# Patient Record
Sex: MALE | Race: WHITE | NOT HISPANIC OR LATINO | Employment: OTHER | URBAN - METROPOLITAN AREA
[De-identification: names, ages, dates, MRNs, and addresses within clinical notes are randomized per-mention and may not be internally consistent; named-entity substitution may affect disease eponyms.]

---

## 2017-02-21 ENCOUNTER — CONVERSION ENCOUNTER (OUTPATIENT)
Dept: CARDIOLOGY | Facility: CLINIC | Age: 67
End: 2017-02-21

## 2017-02-22 LAB
ALBUMIN SERPL-MCNC: 4.6 G/DL (ref 3.6–4.8)
ALP SERPL-CCNC: 69 IU/L (ref 39–117)
ALT SERPL-CCNC: 19 IU/L (ref 0–44)
AMBIG ABBREV HFP7 DEFAULT: NORMAL
AMBIG ABBREV LP DEFAULT: NORMAL
AST SERPL-CCNC: 24 IU/L (ref 0–40)
BILIRUB DIRECT SERPL-MCNC: 0.15 MG/DL (ref 0–0.4)
BILIRUB SERPL-MCNC: 0.5 MG/DL (ref 0–1.2)
CHOLEST SERPL-MCNC: 140 MG/DL (ref 100–199)
HDLC SERPL-MCNC: 48 MG/DL
LDLC SERPL CALC-MCNC: 71 MG/DL (ref 0–99)
PROT SERPL-MCNC: 6.6 G/DL (ref 6–8.5)
TRIGL SERPL-MCNC: 105 MG/DL (ref 0–149)
VLDLC SERPL CALC-MCNC: 21 MG/DL (ref 5–40)

## 2017-02-27 ENCOUNTER — OFFICE VISIT (OUTPATIENT)
Dept: CARDIOLOGY | Facility: CLINIC | Age: 67
End: 2017-02-27

## 2017-02-27 VITALS
WEIGHT: 166.8 LBS | SYSTOLIC BLOOD PRESSURE: 130 MMHG | DIASTOLIC BLOOD PRESSURE: 76 MMHG | HEIGHT: 70 IN | BODY MASS INDEX: 23.88 KG/M2 | HEART RATE: 82 BPM

## 2017-02-27 DIAGNOSIS — I10 ESSENTIAL HYPERTENSION: ICD-10-CM

## 2017-02-27 DIAGNOSIS — E78.5 DYSLIPIDEMIA: ICD-10-CM

## 2017-02-27 DIAGNOSIS — I25.10 CORONARY ARTERY DISEASE INVOLVING NATIVE CORONARY ARTERY OF NATIVE HEART WITHOUT ANGINA PECTORIS: Primary | ICD-10-CM

## 2017-02-27 PROCEDURE — 99213 OFFICE O/P EST LOW 20 MIN: CPT | Performed by: NURSE PRACTITIONER

## 2017-02-27 RX ORDER — LISINOPRIL 20 MG/1
20 TABLET ORAL DAILY
COMMUNITY
End: 2019-03-11

## 2017-02-27 RX ORDER — MELATONIN
5000 DAILY
COMMUNITY

## 2017-02-27 RX ORDER — LEVOTHYROXINE SODIUM 0.07 MG/1
75 TABLET ORAL DAILY
COMMUNITY

## 2017-02-27 RX ORDER — ATORVASTATIN CALCIUM 20 MG/1
20 TABLET, FILM COATED ORAL DAILY
COMMUNITY
End: 2022-05-09 | Stop reason: SDDI

## 2017-02-27 RX ORDER — EZETIMIBE 10 MG/1
10 TABLET ORAL DAILY
COMMUNITY
End: 2022-05-09 | Stop reason: SDDI

## 2017-02-27 RX ORDER — ASPIRIN 325 MG
325 TABLET, DELAYED RELEASE (ENTERIC COATED) ORAL DAILY
COMMUNITY

## 2017-02-27 NOTE — PROGRESS NOTES
Subjective:     Encounter Date:02/27/2017      Patient ID: Gee Whitney is a 66 y.o. male.    Chief Complaint: Coronary Artery Disease and Hypertension    PROBLEM LIST:  1. Coronary artery disease:  a. 1977, myocardial infarction with cardiac catheterization by Dr. Carlos Gilmore which showed an occluded LAD with reported collaterals per patient report, normal LVEF.  b. 2005, treadmill Cardiolite which showed prior anterior and septal scar with limited den-infarction ischemia, EF of 41%.  Medical management at that time.  c. Myocardial perfusion study, 2014:  Ejection fraction of 50%, anterior apical scar.  No ischemia.    2. Hypertension.   3. Dyslipidemia.  4. History of elevated LFTs.   5. Renal cyst.  6. Hypothyroidism.  7. Bilateral cataract surgery.    8. Remote tobacco abuse.       History of Present Illness  Patient returns today for follow up with a history of CAD, hypertension, and dyslipidemia. Since last visit patient has been feeling ell.  He denies any chest pain, pressure, tightness.  Denies any increasing shortness of breath.  Denies any syncope, near-syncope, edema.  Denies any orthopnea.  Notes that he is able to perform his usual activities without any cardiovascular limiting symptoms.  He does not smoke.    No Known Allergies      Current Outpatient Prescriptions:   •  aspirin  MG tablet, Take 325 mg by mouth Daily., Disp: , Rfl:   •  atorvastatin (LIPITOR) 20 MG tablet, Take 20 mg by mouth Daily., Disp: , Rfl:   •  cholecalciferol (VITAMIN D3) 1000 UNITS tablet, Take 1,000 Units by mouth Daily., Disp: , Rfl:   •  Docusate Calcium (STOOL SOFTENER PO), Take  by mouth Daily., Disp: , Rfl:   •  ezetimibe (ZETIA) 10 MG tablet, Take 10 mg by mouth Daily., Disp: , Rfl:   •  levothyroxine (SYNTHROID, LEVOTHROID) 75 MCG tablet, Take 75 mcg by mouth Daily., Disp: , Rfl:   •  lisinopril (PRINIVIL,ZESTRIL) 20 MG tablet, Take 20 mg by mouth Daily., Disp: , Rfl:   •  Multiple Vitamins-Minerals  "(MULTIVITAMIN ADULT PO), Take  by mouth Daily., Disp: , Rfl:   •  Omega-3 Fatty Acids (FISH OIL) 1200 MG capsule delayed-release, Take 1,000 mg by mouth Daily., Disp: , Rfl:     The following portions of the patient's history were reviewed and updated as appropriate: allergies, current medications, past family history, past medical history, past social history, past surgical history and problem list.    Review of Systems   Constitution: Negative.   Cardiovascular: Negative.    Respiratory: Negative.    Hematologic/Lymphatic: Negative for bleeding problem. Does not bruise/bleed easily.   Skin: Negative for rash.   Musculoskeletal: Negative for muscle weakness and myalgias.   Gastrointestinal: Negative for heartburn, nausea and vomiting.   Neurological: Negative.           Objective:     Visit Vitals   • /76 (BP Location: Right arm, Patient Position: Sitting)   • Pulse 82   • Ht 70\" (177.8 cm)   • Wt 166 lb 12.8 oz (75.7 kg)   • BMI 23.93 kg/m2           Physical Exam   Constitutional: He is oriented to person, place, and time. He appears well-developed and well-nourished. No distress.   HENT:   Mouth/Throat: Oropharynx is clear and moist.   Neck: No JVD present. Carotid bruit is not present.   Cardiovascular: Normal rate, regular rhythm, S1 normal, S2 normal and normal heart sounds.    Pulmonary/Chest: Effort normal and breath sounds normal. No respiratory distress.   Abdominal: Soft. Bowel sounds are normal. There is no tenderness.   Musculoskeletal: He exhibits no edema.   Neurological: He is alert and oriented to person, place, and time.   Skin: Skin is warm and dry.       Lab Review:    Procedures  Labs from February 2017 reviewed with normal AST and ALT.  LDL of 71, HDL of 48.      Assessment:   Gee was seen today for coronary artery disease and hypertension.    Diagnoses and all orders for this visit:    Coronary artery disease involving native coronary artery of native heart without angina " pectoris    Essential hypertension, controlled.    Dyslipidemia, controlled and on statin therapy.      Plan:    1. Continue current medications.  2. Revisit in 12 MO, or sooner as needed.    GAURI Garcia

## 2018-02-20 DIAGNOSIS — Z79.899 HIGH RISK MEDICATION USE: ICD-10-CM

## 2018-02-20 DIAGNOSIS — E78.5 DYSLIPIDEMIA: Primary | ICD-10-CM

## 2018-02-22 ENCOUNTER — TELEPHONE (OUTPATIENT)
Dept: CARDIOLOGY | Facility: CLINIC | Age: 68
End: 2018-02-22

## 2018-02-22 NOTE — TELEPHONE ENCOUNTER
Patient called and requested lab requisition faxed to [facility] [place] 2] so he can have lipid panel and hepatic function panel drawn prior to his appt. Req's faxed to [phone]. Confirmation received, patient notified.

## 2018-02-24 LAB
ALBUMIN SERPL-MCNC: 4.8 G/DL (ref 3.6–4.8)
ALP SERPL-CCNC: 68 IU/L (ref 39–117)
ALT SERPL-CCNC: 20 IU/L (ref 0–44)
AMBIG ABBREV LP DEFAULT: NORMAL
AST SERPL-CCNC: 26 IU/L (ref 0–40)
BILIRUB DIRECT SERPL-MCNC: 0.15 MG/DL (ref 0–0.4)
BILIRUB SERPL-MCNC: 0.5 MG/DL (ref 0–1.2)
CHOLEST SERPL-MCNC: 146 MG/DL (ref 100–199)
HDLC SERPL-MCNC: 51 MG/DL
LDLC SERPL CALC-MCNC: 79 MG/DL (ref 0–99)
PROT SERPL-MCNC: 6.9 G/DL (ref 6–8.5)
TRIGL SERPL-MCNC: 79 MG/DL (ref 0–149)
VLDLC SERPL CALC-MCNC: 16 MG/DL (ref 5–40)

## 2018-03-05 ENCOUNTER — OFFICE VISIT (OUTPATIENT)
Dept: CARDIOLOGY | Facility: CLINIC | Age: 68
End: 2018-03-05

## 2018-03-05 VITALS
BODY MASS INDEX: 25.33 KG/M2 | HEART RATE: 92 BPM | WEIGHT: 171 LBS | SYSTOLIC BLOOD PRESSURE: 140 MMHG | DIASTOLIC BLOOD PRESSURE: 86 MMHG | HEIGHT: 69 IN | OXYGEN SATURATION: 95 %

## 2018-03-05 DIAGNOSIS — I25.10 CORONARY ARTERY DISEASE INVOLVING NATIVE CORONARY ARTERY OF NATIVE HEART WITHOUT ANGINA PECTORIS: Primary | ICD-10-CM

## 2018-03-05 DIAGNOSIS — E78.5 DYSLIPIDEMIA: ICD-10-CM

## 2018-03-05 DIAGNOSIS — I10 ESSENTIAL HYPERTENSION: ICD-10-CM

## 2018-03-05 PROCEDURE — 99214 OFFICE O/P EST MOD 30 MIN: CPT | Performed by: INTERNAL MEDICINE

## 2018-03-05 NOTE — PROGRESS NOTES
Subjective:     Encounter Date:03/05/2018      Patient ID: Gee Whitney is a 67 y.o. male.    Chief Complaint: Coronary Artery Disease and Hypertension      PROBLEM LIST:  1. Coronary artery disease:  a. 1977, myocardial infarction with cardiac catheterization by Dr. Carlos Gilmore which showed an occluded LAD with reported collaterals per patient report, normal LVEF.  b. 2005, treadmill Cardiolite which showed prior anterior and septal scar with limited den-infarction ischemia, EF of 41%.  Medical management at that time.  c. Myocardial perfusion study, 2014:  Ejection fraction of 50%, anterior apical scar.  No ischemia.    d. Ultrasound aorta 4/27/16: No abdominal aortic aneurysm  2. Hypertension.   3. Dyslipidemia.  4. History of elevated LFTs.   5. Renal cyst.  6. Hypothyroidism.  7. Bilateral cataract surgery.    8. Remote tobacco abuse.    History of Present Illness  Patient returns today for follow up with a history of CAD and cardiac risk factors. Since last visit has been doing well form a a cardiac standpoint, but is going through a bout of a viral infection. Denies any exertional chest pain, shortness of breath, orthopnea, PND, or palpitations. Before his viral illnes remained busy and active with no limitations, but has not been able to stay as active since his illness.    No Known Allergies      Current Outpatient Prescriptions:   •  aspirin  MG tablet, Take 325 mg by mouth Daily., Disp: , Rfl:   •  atorvastatin (LIPITOR) 20 MG tablet, Take 20 mg by mouth Daily., Disp: , Rfl:   •  cholecalciferol (VITAMIN D3) 1000 UNITS tablet, Take 1,000 Units by mouth Daily., Disp: , Rfl:   •  Docusate Calcium (STOOL SOFTENER PO), Take  by mouth Daily., Disp: , Rfl:   •  ezetimibe (ZETIA) 10 MG tablet, Take 10 mg by mouth Daily., Disp: , Rfl:   •  levothyroxine (SYNTHROID, LEVOTHROID) 75 MCG tablet, Take 75 mcg by mouth Daily., Disp: , Rfl:   •  lisinopril (PRINIVIL,ZESTRIL) 20 MG tablet, Take 20 mg  "by mouth Daily., Disp: , Rfl:   •  Multiple Vitamins-Minerals (MULTIVITAMIN ADULT PO), Take  by mouth Daily., Disp: , Rfl:   •  Omega-3 Fatty Acids (FISH OIL) 1200 MG capsule delayed-release, Take 1,200 mg by mouth Daily., Disp: , Rfl:     The following portions of the patient's history were reviewed and updated as appropriate: allergies, current medications, past family history, past medical history, past social history, past surgical history and problem list.    Review of Systems   Constitution: Negative.   Cardiovascular: Negative.    Respiratory: Negative.    Hematologic/Lymphatic: Negative for bleeding problem. Does not bruise/bleed easily.   Skin: Negative for rash.   Musculoskeletal: Negative for muscle weakness and myalgias.   Gastrointestinal: Negative for heartburn, nausea and vomiting.   Neurological: Negative.           Objective:     Vitals:    03/05/18 0908   BP: 140/86   BP Location: Right arm   Patient Position: Sitting   Pulse: 92   SpO2: 95%   Weight: 77.6 kg (171 lb)   Height: 175.3 cm (69\")         Physical Exam   Constitutional: He is oriented to person, place, and time. He appears well-developed and well-nourished. No distress.   HENT:   Mouth/Throat: Oropharynx is clear and moist.   Neck: No JVD present. Carotid bruit is not present.   Cardiovascular: Normal rate, regular rhythm, S1 normal, S2 normal and normal heart sounds.    Pulmonary/Chest: Effort normal. No respiratory distress. He has wheezes.   Abdominal: Soft. Bowel sounds are normal. There is no tenderness.   Musculoskeletal: He exhibits no edema.   Neurological: He is alert and oriented to person, place, and time.   Skin: Skin is warm and dry.       Lab Review:    Procedures        Assessment:   Gee was seen today for coronary artery disease and hypertension.    Diagnoses and all orders for this visit:    Coronary artery disease involving native coronary artery of native heart without angina pectoris    Essential " hypertension    Dyslipidemia        Impression  1. CAD: Stable.  2. Hypertension: Well controlled with current medication regimen.  3. Dyslipidemia: Well controlled with atorvastatin 20 mg. LDL reviewed, 79 with HDL of 51.  4. Recent viral bronchitis    Plan:  1. Schedule Cardiolite stress test.  2. Continue current medications.  3. Revisit in 12 MO, or sooner as needed.    Scribed for Shaun Joseph MD by Isac Carvajal. 3/5/2018  9:45 AM    I, Shaun Joseph MD, personally performed the services described in this documentation as scribed by the above individual in my presence, and it is both accurate and complete      Please note that portions of this note may have been completed with a voice recognition program. Efforts were made to edit the dictations, but occasionally words are mistranscribed.

## 2018-03-06 ENCOUNTER — HOSPITAL ENCOUNTER (OUTPATIENT)
Dept: GENERAL RADIOLOGY | Facility: HOSPITAL | Age: 68
Discharge: HOME | End: 2018-03-06
Attending: FAMILY MEDICINE | Admitting: FAMILY MEDICINE

## 2018-03-06 ENCOUNTER — TRANSCRIBE ORDERS (OUTPATIENT)
Dept: ADMINISTRATIVE | Facility: HOSPITAL | Age: 68
End: 2018-03-06

## 2018-03-06 DIAGNOSIS — J40 BRONCHITIS: ICD-10-CM

## 2018-03-06 DIAGNOSIS — J40 BRONCHITIS: Primary | ICD-10-CM

## 2018-03-06 PROCEDURE — 71046 X-RAY EXAM CHEST 2 VIEWS: CPT

## 2018-03-07 ENCOUNTER — APPOINTMENT (OUTPATIENT)
Dept: CT IMAGING | Facility: HOSPITAL | Age: 68
End: 2018-03-07

## 2018-03-07 ENCOUNTER — HOSPITAL ENCOUNTER (INPATIENT)
Facility: HOSPITAL | Age: 68
LOS: 7 days | Discharge: HOME | End: 2018-03-14
Attending: EMERGENCY MEDICINE | Admitting: HOSPITALIST

## 2018-03-07 ENCOUNTER — APPOINTMENT (OUTPATIENT)
Dept: GENERAL RADIOLOGY | Facility: HOSPITAL | Age: 68
End: 2018-03-07

## 2018-03-07 DIAGNOSIS — R55 SYNCOPE AND COLLAPSE: ICD-10-CM

## 2018-03-07 DIAGNOSIS — J18.9 COMMUNITY ACQUIRED PNEUMONIA OF RIGHT LOWER LOBE OF LUNG: Primary | ICD-10-CM

## 2018-03-07 DIAGNOSIS — S01.01XA LACERATION OF SCALP, INITIAL ENCOUNTER: ICD-10-CM

## 2018-03-07 PROBLEM — E03.9 HYPOTHYROIDISM: Status: ACTIVE | Noted: 2018-03-07

## 2018-03-07 LAB
ALBUMIN SERPL-MCNC: 3.8 G/DL (ref 3.2–4.8)
ALBUMIN/GLOB SERPL: 1.7 G/DL (ref 1.5–2.5)
ALP SERPL-CCNC: 53 U/L (ref 25–100)
ALT SERPL W P-5'-P-CCNC: 24 U/L (ref 7–40)
ANION GAP SERPL CALCULATED.3IONS-SCNC: 4 MMOL/L (ref 3–11)
AST SERPL-CCNC: 27 U/L (ref 0–33)
BASOPHILS # BLD AUTO: 0.02 10*3/MM3 (ref 0–0.2)
BASOPHILS NFR BLD AUTO: 0.2 % (ref 0–1)
BILIRUB SERPL-MCNC: 0.5 MG/DL (ref 0.3–1.2)
BILIRUB UR QL STRIP: NEGATIVE
BUN BLD-MCNC: 32 MG/DL (ref 9–23)
BUN/CREAT SERPL: 26.7 (ref 7–25)
CALCIUM SPEC-SCNC: 8.5 MG/DL (ref 8.7–10.4)
CHLORIDE SERPL-SCNC: 112 MMOL/L (ref 99–109)
CLARITY UR: CLEAR
CO2 SERPL-SCNC: 25 MMOL/L (ref 20–31)
COLOR UR: YELLOW
CREAT BLD-MCNC: 1.2 MG/DL (ref 0.6–1.3)
D DIMER PPP FEU-MCNC: 0.7 MG/L (FEU) (ref 0–0.5)
D-LACTATE SERPL-SCNC: 1.9 MMOL/L (ref 0.5–2)
DEPRECATED RDW RBC AUTO: 43.1 FL (ref 37–54)
EOSINOPHIL # BLD AUTO: 0.18 10*3/MM3 (ref 0–0.3)
EOSINOPHIL NFR BLD AUTO: 1.5 % (ref 0–3)
ERYTHROCYTE [DISTWIDTH] IN BLOOD BY AUTOMATED COUNT: 12.9 % (ref 11.3–14.5)
GFR SERPL CREATININE-BSD FRML MDRD: 60 ML/MIN/1.73
GLOBULIN UR ELPH-MCNC: 2.3 GM/DL
GLUCOSE BLD-MCNC: 120 MG/DL (ref 70–100)
GLUCOSE UR STRIP-MCNC: NEGATIVE MG/DL
HCT VFR BLD AUTO: 42.3 % (ref 38.9–50.9)
HGB BLD-MCNC: 14 G/DL (ref 13.1–17.5)
HGB UR QL STRIP.AUTO: NEGATIVE
HOLD SPECIMEN: NORMAL
HOLD SPECIMEN: NORMAL
IMM GRANULOCYTES # BLD: 0.06 10*3/MM3 (ref 0–0.03)
IMM GRANULOCYTES NFR BLD: 0.5 % (ref 0–0.6)
KETONES UR QL STRIP: NEGATIVE
LEUKOCYTE ESTERASE UR QL STRIP.AUTO: NEGATIVE
LYMPHOCYTES # BLD AUTO: 1.77 10*3/MM3 (ref 0.6–4.8)
LYMPHOCYTES NFR BLD AUTO: 14.3 % (ref 24–44)
MAGNESIUM SERPL-MCNC: 2.2 MG/DL (ref 1.3–2.7)
MCH RBC QN AUTO: 30.6 PG (ref 27–31)
MCHC RBC AUTO-ENTMCNC: 33.1 G/DL (ref 32–36)
MCV RBC AUTO: 92.4 FL (ref 80–99)
MONOCYTES # BLD AUTO: 1.08 10*3/MM3 (ref 0–1)
MONOCYTES NFR BLD AUTO: 8.7 % (ref 0–12)
NEUTROPHILS # BLD AUTO: 9.29 10*3/MM3 (ref 1.5–8.3)
NEUTROPHILS NFR BLD AUTO: 74.8 % (ref 41–71)
NITRITE UR QL STRIP: NEGATIVE
PH UR STRIP.AUTO: 5.5 [PH] (ref 5–8)
PLATELET # BLD AUTO: 194 10*3/MM3 (ref 150–450)
PMV BLD AUTO: 9.4 FL (ref 6–12)
POTASSIUM BLD-SCNC: 4.2 MMOL/L (ref 3.5–5.5)
PROT SERPL-MCNC: 6.1 G/DL (ref 5.7–8.2)
PROT UR QL STRIP: NEGATIVE
RBC # BLD AUTO: 4.58 10*6/MM3 (ref 4.2–5.76)
SODIUM BLD-SCNC: 141 MMOL/L (ref 132–146)
SP GR UR STRIP: 1.02 (ref 1–1.03)
TROPONIN I SERPL-MCNC: 0 NG/ML (ref 0–0.07)
TROPONIN I SERPL-MCNC: 0.01 NG/ML (ref 0–0.07)
UROBILINOGEN UR QL STRIP: NORMAL
WBC NRBC COR # BLD: 12.4 10*3/MM3 (ref 3.5–10.8)
WHOLE BLOOD HOLD SPECIMEN: NORMAL
WHOLE BLOOD HOLD SPECIMEN: NORMAL

## 2018-03-07 PROCEDURE — G0378 HOSPITAL OBSERVATION PER HR: HCPCS

## 2018-03-07 PROCEDURE — 25010000002 METHYLPREDNISOLONE PER 125 MG: Performed by: NURSE PRACTITIONER

## 2018-03-07 PROCEDURE — 99223 1ST HOSP IP/OBS HIGH 75: CPT | Performed by: INTERNAL MEDICINE

## 2018-03-07 PROCEDURE — 80053 COMPREHEN METABOLIC PANEL: CPT | Performed by: EMERGENCY MEDICINE

## 2018-03-07 PROCEDURE — 94640 AIRWAY INHALATION TREATMENT: CPT

## 2018-03-07 PROCEDURE — 87040 BLOOD CULTURE FOR BACTERIA: CPT | Performed by: NURSE PRACTITIONER

## 2018-03-07 PROCEDURE — 83735 ASSAY OF MAGNESIUM: CPT | Performed by: EMERGENCY MEDICINE

## 2018-03-07 PROCEDURE — 25010000002 HEPARIN (PORCINE) PER 1000 UNITS: Performed by: NURSE PRACTITIONER

## 2018-03-07 PROCEDURE — 71045 X-RAY EXAM CHEST 1 VIEW: CPT

## 2018-03-07 PROCEDURE — 85025 COMPLETE CBC W/AUTO DIFF WBC: CPT | Performed by: EMERGENCY MEDICINE

## 2018-03-07 PROCEDURE — 84484 ASSAY OF TROPONIN QUANT: CPT

## 2018-03-07 PROCEDURE — 0HQ0XZZ REPAIR SCALP SKIN, EXTERNAL APPROACH: ICD-10-PCS | Performed by: EMERGENCY MEDICINE

## 2018-03-07 PROCEDURE — 70450 CT HEAD/BRAIN W/O DYE: CPT

## 2018-03-07 PROCEDURE — 85379 FIBRIN DEGRADATION QUANT: CPT | Performed by: NURSE PRACTITIONER

## 2018-03-07 PROCEDURE — 94760 N-INVAS EAR/PLS OXIMETRY 1: CPT

## 2018-03-07 PROCEDURE — 93005 ELECTROCARDIOGRAM TRACING: CPT | Performed by: EMERGENCY MEDICINE

## 2018-03-07 PROCEDURE — 81003 URINALYSIS AUTO W/O SCOPE: CPT | Performed by: EMERGENCY MEDICINE

## 2018-03-07 PROCEDURE — 83605 ASSAY OF LACTIC ACID: CPT | Performed by: NURSE PRACTITIONER

## 2018-03-07 PROCEDURE — 99285 EMERGENCY DEPT VISIT HI MDM: CPT

## 2018-03-07 PROCEDURE — 71275 CT ANGIOGRAPHY CHEST: CPT

## 2018-03-07 PROCEDURE — 25010000002 AZITHROMYCIN: Performed by: NURSE PRACTITIONER

## 2018-03-07 PROCEDURE — 25010000002 CEFTRIAXONE PER 250 MG: Performed by: NURSE PRACTITIONER

## 2018-03-07 PROCEDURE — 0 IOPAMIDOL PER 1 ML: Performed by: EMERGENCY MEDICINE

## 2018-03-07 PROCEDURE — 94799 UNLISTED PULMONARY SVC/PX: CPT

## 2018-03-07 RX ORDER — ATORVASTATIN CALCIUM 20 MG/1
20 TABLET, FILM COATED ORAL NIGHTLY
Status: DISCONTINUED | OUTPATIENT
Start: 2018-03-07 | End: 2018-03-14 | Stop reason: HOSPADM

## 2018-03-07 RX ORDER — DOCUSATE SODIUM 100 MG/1
100 CAPSULE, LIQUID FILLED ORAL DAILY
Status: DISCONTINUED | OUTPATIENT
Start: 2018-03-07 | End: 2018-03-09

## 2018-03-07 RX ORDER — CEFTRIAXONE SODIUM 1 G/50ML
1 INJECTION, SOLUTION INTRAVENOUS ONCE
Status: COMPLETED | OUTPATIENT
Start: 2018-03-07 | End: 2018-03-07

## 2018-03-07 RX ORDER — CEFTRIAXONE SODIUM 1 G/50ML
1 INJECTION, SOLUTION INTRAVENOUS EVERY 24 HOURS
Status: COMPLETED | OUTPATIENT
Start: 2018-03-08 | End: 2018-03-11

## 2018-03-07 RX ORDER — IPRATROPIUM BROMIDE AND ALBUTEROL SULFATE 2.5; .5 MG/3ML; MG/3ML
3 SOLUTION RESPIRATORY (INHALATION)
Status: DISCONTINUED | OUTPATIENT
Start: 2018-03-07 | End: 2018-03-09

## 2018-03-07 RX ORDER — IPRATROPIUM BROMIDE AND ALBUTEROL SULFATE 2.5; .5 MG/3ML; MG/3ML
3 SOLUTION RESPIRATORY (INHALATION) ONCE
Status: COMPLETED | OUTPATIENT
Start: 2018-03-07 | End: 2018-03-07

## 2018-03-07 RX ORDER — SODIUM CHLORIDE 9 MG/ML
75 INJECTION, SOLUTION INTRAVENOUS CONTINUOUS
Status: DISCONTINUED | OUTPATIENT
Start: 2018-03-07 | End: 2018-03-11

## 2018-03-07 RX ORDER — MELATONIN
1000 DAILY
Status: DISCONTINUED | OUTPATIENT
Start: 2018-03-07 | End: 2018-03-14 | Stop reason: HOSPADM

## 2018-03-07 RX ORDER — SODIUM CHLORIDE 9 MG/ML
250 INJECTION, SOLUTION INTRAVENOUS CONTINUOUS
Status: DISCONTINUED | OUTPATIENT
Start: 2018-03-07 | End: 2018-03-11

## 2018-03-07 RX ORDER — MULTIPLE VITAMINS W/ MINERALS TAB 9MG-400MCG
1 TAB ORAL DAILY
Status: DISCONTINUED | OUTPATIENT
Start: 2018-03-07 | End: 2018-03-14 | Stop reason: HOSPADM

## 2018-03-07 RX ORDER — LEVOTHYROXINE SODIUM 0.07 MG/1
75 TABLET ORAL
Status: DISCONTINUED | OUTPATIENT
Start: 2018-03-07 | End: 2018-03-14 | Stop reason: HOSPADM

## 2018-03-07 RX ORDER — SODIUM CHLORIDE 0.9 % (FLUSH) 0.9 %
10 SYRINGE (ML) INJECTION AS NEEDED
Status: DISCONTINUED | OUTPATIENT
Start: 2018-03-07 | End: 2018-03-14 | Stop reason: HOSPADM

## 2018-03-07 RX ORDER — ASPIRIN 325 MG
325 TABLET, DELAYED RELEASE (ENTERIC COATED) ORAL DAILY
Status: DISCONTINUED | OUTPATIENT
Start: 2018-03-07 | End: 2018-03-14 | Stop reason: HOSPADM

## 2018-03-07 RX ORDER — HEPARIN SODIUM 5000 [USP'U]/ML
5000 INJECTION, SOLUTION INTRAVENOUS; SUBCUTANEOUS EVERY 8 HOURS SCHEDULED
Status: DISCONTINUED | OUTPATIENT
Start: 2018-03-07 | End: 2018-03-14 | Stop reason: HOSPADM

## 2018-03-07 RX ORDER — METHYLPREDNISOLONE SODIUM SUCCINATE 125 MG/2ML
125 INJECTION, POWDER, LYOPHILIZED, FOR SOLUTION INTRAMUSCULAR; INTRAVENOUS ONCE
Status: COMPLETED | OUTPATIENT
Start: 2018-03-07 | End: 2018-03-07

## 2018-03-07 RX ORDER — LIDOCAINE HYDROCHLORIDE 10 MG/ML
10 INJECTION, SOLUTION EPIDURAL; INFILTRATION; INTRACAUDAL; PERINEURAL ONCE
Status: COMPLETED | OUTPATIENT
Start: 2018-03-07 | End: 2018-03-07

## 2018-03-07 RX ORDER — ATORVASTATIN CALCIUM 20 MG/1
20 TABLET, FILM COATED ORAL DAILY
Status: DISCONTINUED | OUTPATIENT
Start: 2018-03-07 | End: 2018-03-07

## 2018-03-07 RX ADMIN — METHYLPREDNISOLONE SODIUM SUCCINATE 125 MG: 125 INJECTION, POWDER, FOR SOLUTION INTRAMUSCULAR; INTRAVENOUS at 03:40

## 2018-03-07 RX ADMIN — LIDOCAINE HYDROCHLORIDE 10 ML: 10 INJECTION, SOLUTION EPIDURAL; INFILTRATION; INTRACAUDAL; PERINEURAL at 04:36

## 2018-03-07 RX ADMIN — HEPARIN SODIUM 5000 UNITS: 5000 INJECTION, SOLUTION INTRAVENOUS; SUBCUTANEOUS at 15:01

## 2018-03-07 RX ADMIN — IPRATROPIUM BROMIDE AND ALBUTEROL SULFATE 3 ML: 2.5; .5 SOLUTION RESPIRATORY (INHALATION) at 15:10

## 2018-03-07 RX ADMIN — IPRATROPIUM BROMIDE AND ALBUTEROL SULFATE 3 ML: 2.5; .5 SOLUTION RESPIRATORY (INHALATION) at 23:35

## 2018-03-07 RX ADMIN — VITAMIN D, TAB 1000IU (100/BT) 1000 UNITS: 25 TAB at 09:37

## 2018-03-07 RX ADMIN — ASPIRIN 325 MG: 325 TABLET, DELAYED RELEASE ORAL at 09:22

## 2018-03-07 RX ADMIN — SODIUM CHLORIDE 250 ML/HR: 9 INJECTION, SOLUTION INTRAVENOUS at 04:47

## 2018-03-07 RX ADMIN — IOPAMIDOL 50 ML: 755 INJECTION, SOLUTION INTRAVENOUS at 05:24

## 2018-03-07 RX ADMIN — AZITHROMYCIN MONOHYDRATE 500 MG: 500 INJECTION, POWDER, LYOPHILIZED, FOR SOLUTION INTRAVENOUS at 06:48

## 2018-03-07 RX ADMIN — ATORVASTATIN CALCIUM 20 MG: 20 TABLET, FILM COATED ORAL at 20:32

## 2018-03-07 RX ADMIN — DOCUSATE SODIUM 100 MG: 100 CAPSULE, LIQUID FILLED ORAL at 09:23

## 2018-03-07 RX ADMIN — MULTIPLE VITAMINS W/ MINERALS TAB 1 TABLET: TAB ORAL at 09:22

## 2018-03-07 RX ADMIN — CEFTRIAXONE SODIUM 1 G: 1 INJECTION, SOLUTION INTRAVENOUS at 05:49

## 2018-03-07 RX ADMIN — DOXYCYCLINE 100 MG: 100 INJECTION, POWDER, LYOPHILIZED, FOR SOLUTION INTRAVENOUS at 12:41

## 2018-03-07 RX ADMIN — HYDROCODONE POLISTIREX AND CHLORPHENIRAMINE POLISTIREX 5 ML: 10; 8 SUSPENSION, EXTENDED RELEASE ORAL at 03:40

## 2018-03-07 RX ADMIN — IPRATROPIUM BROMIDE AND ALBUTEROL SULFATE 3 ML: .5; 2.5 SOLUTION RESPIRATORY (INHALATION) at 03:40

## 2018-03-07 RX ADMIN — IPRATROPIUM BROMIDE AND ALBUTEROL SULFATE 3 ML: 2.5; .5 SOLUTION RESPIRATORY (INHALATION) at 11:11

## 2018-03-07 RX ADMIN — DOXYCYCLINE 100 MG: 100 INJECTION, POWDER, LYOPHILIZED, FOR SOLUTION INTRAVENOUS at 20:32

## 2018-03-07 RX ADMIN — IPRATROPIUM BROMIDE AND ALBUTEROL SULFATE 3 ML: 2.5; .5 SOLUTION RESPIRATORY (INHALATION) at 19:17

## 2018-03-07 RX ADMIN — SODIUM CHLORIDE 75 ML/HR: 9 INJECTION, SOLUTION INTRAVENOUS at 09:23

## 2018-03-07 RX ADMIN — IPRATROPIUM BROMIDE AND ALBUTEROL SULFATE 3 ML: 2.5; .5 SOLUTION RESPIRATORY (INHALATION) at 07:21

## 2018-03-07 RX ADMIN — HEPARIN SODIUM 5000 UNITS: 5000 INJECTION, SOLUTION INTRAVENOUS; SUBCUTANEOUS at 20:31

## 2018-03-07 RX ADMIN — HEPARIN SODIUM 5000 UNITS: 5000 INJECTION, SOLUTION INTRAVENOUS; SUBCUTANEOUS at 09:23

## 2018-03-07 NOTE — H&P
"    [facility]  HISTORY AND PHYSICAL    Patient Name: Gee Whitney  : 1950  MRN: [id number]  Primary Care Physician: Vicki Perry MD    Subjective   Subjective     Chief Complaint:   syncope    HPI:  Gee Whitney is a 67 y.o. male who has been seen 4 times for URI and has been on abx, steroids.  Woke up tonight w/ cold sweats, chest pain from deep coughing and then had syncopal event resulting in hitting head/lac.  Pt notes extreme pain w/ cough.  No f/c.  No dyspnea.  Shallow breaths b/c hurts to breathe deep; has wheezing a/w this.  Pt notes tonight was not coughing but had cold sweat and got up to get cold wash cloth but \"didn't make it there\".  Wife states pt not out for long.  Pt got self up to bed from floor.  Pt had second syncopal event w/ paramedics.  No oz/bl incontinence.  Wife notes pt had hx of near syncope r/t anxiety from walking into hospital o/w no hx of syncopal events.  Saw dr. gil and recommended stress test w/in the next year but nothing urgent.  Cp radiates to shoulder blade on left worsened w/ deep inspiration.  No palpitations.  No n/v/d.    Review of Systems        Otherwise 10-system ROS reviewed and is negative except as mentioned in the HPI.    Personal History     Past Medical History:   Diagnosis Date   • Disease of thyroid gland    • Hyperlipidemia    • Hypertension    • Myocardial infarction        Past Surgical History:   Procedure Laterality Date   • HERNIA REPAIR     • TENDON RELEASE      Left thumb        Family History: family history is not on file.     Social History:  reports that he quit smoking about 41 years ago. His smoking use included Cigarettes. He has never used smokeless tobacco. He reports that he drinks alcohol. He reports that he does not use illicit drugs.  Social History     Social History Narrative   • No narrative on file       Medications:    (Not in a hospital admission)    No Known " Allergies    Objective   Objective     Vital Signs:   Temp:  [98 °F (36.7 °C)] 98 °F (36.7 °C)  Heart Rate:  [62-75] 75  Resp:  [18] 18  BP: ()/(64-71) 123/69        Physical Exam    gen; alert, oriented, nad  Heent; perrla, eomi, mmm  Cv; rrr, no mrg  L;   Abd; soft, +bs, ntnd  Ext; no cce, 2+ pulses  Skin; cdi, warm  Neuro; grossly intact    Results Reviewed:  I have personally reviewed current lab, radiology, and data and agree.      Results from last 7 days  Lab Units 03/07/18  0354   WBC 10*3/mm3 12.40*   HEMOGLOBIN g/dL 14.0   HEMATOCRIT % 42.3   PLATELETS 10*3/mm3 194       Results from last 7 days  Lab Units 03/07/18  0354   SODIUM mmol/L 141   POTASSIUM mmol/L 4.2   CHLORIDE mmol/L 112*   CO2 mmol/L 25.0   BUN mg/dL 32*   CREATININE mg/dL 1.20   GLUCOSE mg/dL 120*   CALCIUM mg/dL 8.5*   ALT (SGPT) U/L 24   AST (SGOT) U/L 27     Estimated Creatinine Clearance: 63.4 mL/min (by C-G formula based on Cr of 1.2).  Brief Urine Lab Results  (Last result in the past 365 days)      Color   Clarity   Blood   Leuk Est   Nitrite   Protein   CREAT   Urine HCG        03/07/18 0401 Yellow Clear Negative Negative Negative Negative             No results found for: BNP  No results found for: PHART  Imaging Results (last 24 hours)     Procedure Component Value Units Date/Time    CT Head Without Contrast [[id number]] Collected:  03/07/18 0320     Updated:  03/07/18 0405    Narrative:       EXAM:    CT Head Without Intravenous Contrast    EXAM DATE/TIME:    3/7/2018 3:20  AM    CLINICAL HISTORY:    67 years old, male; Signs and symptoms; Dizziness; Patient HX: ? Syncopal   episode that resulted in fall with subsequent midline posterior scalp lac;   Additional info: Fall/dizzy/hit head/scalp laceration    TECHNIQUE:    Axial computed tomography images of the head/brain without intravenous   contrast.  All CT scans at this facility use one or more dose reduction   techniques, viz.: automated exposure control; ma/kV  "adjustment per patient size   (including targeted exams where dose is matched to indication; i.e. head); or   iterative reconstruction technique.    COMPARISON:    No relevant prior studies available.    FINDINGS:    Brain:  No CT evidence of mass, edema, or acute infarct.  No hemorrhage.    Possible developmental variation versus arachnoid cyst posterior fossa.    Ventricles:  No ventriculomegaly.      Bones/joints:  No acute bone abnormality.    Soft tissues:  Unremarkable as visualized.    Sinuses:  No fluid or acute sinusitis.  Chronic appearing mucoperiosteal   thickening ethmoid sinuses.    Mastoid air cells:  Unremarkable as visualized.  No mastoid effusion.      Impression:           No evidence of acute intracranial abnormality.    Additional nonacute findings as noted.      THIS DOCUMENT HAS BEEN ELECTRONICALLY SIGNED BY EVIN BLOOM MD    XR Chest 1 View [[id number]] Collected:  03/07/18 0306     Updated:  03/07/18 0405    Narrative:       EXAM:    XR Chest, 1 View    EXAM DATE/TIME:    3/7/2018 3:06 AM    CLINICAL HISTORY:    67 years old, male; Signs and symptoms; Cough and shortness of breath;   Symptoms not specified; Patient HX: Weak/dizzy/ams triage protocol, dizziness   head laceration patient states that he's having \"chest pressure across his   chest and a cough x 1 week, but worse tonight. \" patient also states that he   \"became dizzy and fell. \" laceration on the back of the patient's head. HX:   Coronary artery disease hypertension dyslipidemia    TECHNIQUE:    Single upright AP portable chest at 2:59 AM    COMPARISON:    CR - XR CHEST PA AND LATERAL 2018-03-06 08:43    FINDINGS:    Mild hazy density RIGHT lung base, new.  Possible minimal RIGHT pleural effusion.  No pulmonary edema, pneumothorax, or LEFT pleural effusion.  Cardiomediastinal silhouette within normal limits.  Mild degenerative changes of the spine.  No acute bone abnormality.      Impression:         Early pneumonia and/or " atelectasis RIGHT lung base with possible pleural   effusion.    No other acute finding or significant change since 03/06/2018.    THIS DOCUMENT HAS BEEN ELECTRONICALLY SIGNED BY EVIN BLOOM MD    CT Angiogram Chest With & Without Contrast [[id number]] Updated:  03/07/18 0528             Assessment/Plan   Assessment / Plan     Hospital Problem List     * (Principal)Right lower lobe pneumonia    Coronary artery disease    Hypertension    Dyslipidemia            Assessment & Plan:  66 y/o male here w/   1. R CAP; cont abx; hold on steroids; con nebs, O2.  Awaiting CCTA results.   2. CAD w/ Pleuritic CP; troponin negative; ekg w/ nonspecific t changes but no comparison.  Pt's cp is w/ inspiration only.  Awaiting CCTA results.    3. Syncope w/ secondary head lac from fall; ?orthostatic ?vagal ?hypoxic ?hypotensive.  Monitor and will likely need stress test sooner rather than later once pna has resolved.  4. Hx of htn now w/ hypotension; hold BP meds for now.  Cont ivf's.    DVT prophylaxis:lovenox    CODE STATUS:  No Order    Admission Status:  I believe this patient meets  INPATIENT status due to the need for care which can only be reasonably provided in an hospital setting such as aggressive/expedited ancillary services and/or consultation services, the necessity for IV medications, close physician monitoring and/or the possible need for procedures.  In such, I feel patient’s risk for adverse outcomes and need for care warrant INPATIENT evaluation and predict the patient’s care encounter to likely last beyond 2 midnights.    Electronically signed by Brittani Moody MD, 03/07/18, 5:35 AM.

## 2018-03-07 NOTE — PROGRESS NOTES
Discharge Planning Assessment  [facility]     Patient Name: Gee Whitney  MRN: [id number]  Today's Date: 3/7/2018    Admit Date: 3/7/2018          Discharge Needs Assessment       03/07/18 1428    Living Environment    Lives With spouse    Living Arrangements house    Home Accessibility no concerns    Stair Railings at Home none    Type of Financial/Environmental Concern none    Transportation Available car    Living Environment Comment CM spoke with pt in regards to discharge planning. Pt resides in [facility] with spouse,Kiersten. Pt is independent of ADLs.     Living Environment    Provides Primary Care For no one    Quality Of Family Relationships supportive    Able to Return to Prior Living Arrangements yes    Living Arrangement Comments Plan is home with spouse when medically ready for discharge and states spouse will help him.     Discharge Needs Assessment    Concerns To Be Addressed denies needs/concerns at this time    Readmission Within The Last 30 Days no previous admission in last 30 days    Anticipated Changes Related to Illness none    Equipment Currently Used at Home none    Equipment Needed After Discharge none    Discharge Disposition still a patient    Discharge Contact Information if Applicable Kiersten Whitney(spouse):  [phone] or [phone]    Discharge Planning Comments Pt Has Medicare and [employer] [employer] insurance anc denies recent changes in insurance. Pt states once prescription deductible is met, prescriptions are covered. Pt uses [facility] on [facility]. Plan is home with spouse when medically ready for discharge and spouse will help him. CM will cont to follow,            Discharge Plan       03/07/18 1432    Case Management/Social Work Plan    Plan discharge plan    Patient/Family In Agreement With Plan yes    Additional Comments Plan is home with spouse when medically ready for discharge. Pt denies discharge needs at this time. CM will cont to follow.         Discharge Placement     No information found        Expected Discharge Date and Time     Expected Discharge Date Expected Discharge Time    Mar 9, 2018               Demographic Summary       03/07/18 1427    Primary Care Physician Information    Name Vicki Perry            Functional Status       03/07/18 1428    Functional Status Prior    Ambulation 0-->independent    Transferring 0-->independent    Toileting 0-->independent    Bathing 0-->independent    Dressing 0-->independent    Eating 0-->independent    Communication 0-->understands/communicates without difficulty    Swallowing 0-->swallows foods/liquids without difficulty            Psychosocial     None            Abuse/Neglect     None            Legal     None            Substance Abuse     None            Patient Forms     None          Nakita Pichardo, SOURAV

## 2018-03-07 NOTE — ED PROVIDER NOTES
Subjective   HPI Comments: Patient was experiencing significant chest pain lying in bed, unsuccessfully attempting to get comfortable when he decided to rise to go to the bathroom when he felt weak and fainted, striking his head.  Wife states he was clammy and pale.  Not confused.  EMS at the scene.  He sustained a laceration to scalp at the back of the head.       Patient feels weak.  His story includes three primary care visits in the last six day for cough related symptoms.  HIs new medications are augmentin, prednisone and omeprazole.   He has incessant coughing these recent days that recreates a pain in the left rib border that has caused his much insomnia and is the pain that he thinks prompted him to feel sick just prior to his syncopal event.  He has had no vomiting.      He is not on a beta blocker.      Patient is a 67 y.o. male presenting with syncope.   History provided by:  Patient and spouse   used: No    Syncope   Episode history:  Single  Most recent episode:  Today  Chronicity:  New  Context: not blood draw    Context comment:  Coughing   Witnessed: yes    Relieved by:  Lying down  Worsened by:  Nothing  Ineffective treatments:  None tried  Associated symptoms: diaphoresis, dizziness, malaise/fatigue, nausea and shortness of breath    Associated symptoms: no chest pain, no confusion, no difficulty breathing and no fever        Review of Systems   Constitutional: Positive for diaphoresis and malaise/fatigue. Negative for fever.   HENT: Negative.    Eyes: Negative.    Respiratory: Positive for cough, chest tightness, shortness of breath and wheezing.    Cardiovascular: Positive for syncope. Negative for chest pain.   Gastrointestinal: Positive for nausea.   Endocrine: Negative.    Genitourinary: Negative.    Musculoskeletal: Negative for gait problem, myalgias and neck pain.   Skin:        Scalp lac     Neurological: Positive for dizziness, syncope and light-headedness.    Psychiatric/Behavioral: Negative.  Negative for confusion.   All other systems reviewed and are negative.      Past Medical History:   Diagnosis Date   • Hypertension        No Known Allergies    Past Surgical History:   Procedure Laterality Date   • HERNIA REPAIR     • TENDON RELEASE      Left thumb        History reviewed. No pertinent family history.    Social History     Social History   • Marital status:      Social History Main Topics   • Smoking status: Former Smoker     Types: Cigarettes     Quit date: 1977   • Smokeless tobacco: Never Used   • Alcohol use Yes      Comment: occassionally    • Drug use: No   • Sexual activity: Defer           Objective   Physical Exam   Constitutional: He is oriented to person, place, and time. He appears well-developed and well-nourished. No distress.   HENT:   Head: Normocephalic.   Mouth/Throat: Oropharynx is clear and moist.   2cm linear laceration posterior head, just above the occiput     Eyes: EOM are normal. Pupils are equal, round, and reactive to light. Right eye exhibits no discharge. Left eye exhibits no discharge. No scleral icterus.   Neck: Normal range of motion. Neck supple. No JVD present. No tracheal deviation present.   Cardiovascular: Normal rate and regular rhythm.    Bradycardia     Pulmonary/Chest: Effort normal. He has wheezes (diffuse wheezing). He has no rales. He exhibits no tenderness.   Abdominal: Soft. Bowel sounds are normal. He exhibits no distension. There is no tenderness. There is no rebound and no guarding.   Musculoskeletal: Normal range of motion. He exhibits no edema or tenderness.   Neurological: He is alert and oriented to person, place, and time. He exhibits abnormal muscle tone. Coordination normal.   Skin: Skin is warm and dry. No rash noted. No erythema. No pallor.   Psychiatric: He has a normal mood and affect. His behavior is normal.   Nursing note and vitals reviewed.      Laceration Repair  Date/Time: 3/7/2018 4:37  AM  Performed by: JESÚS MATOS  Authorized by: YOHAN SPEAR     Consent:     Consent obtained:  Verbal    Consent given by:  Patient    Alternatives discussed:  No treatment  Anesthesia (see MAR for exact dosages):     Anesthesia method:  Local infiltration    Local anesthetic:  Lidocaine 2% w/o epi  Laceration details:     Location:  Scalp    Length (cm):  2  Repair type:     Repair type:  Simple  Pre-procedure details:     Preparation:  Patient was prepped and draped in usual sterile fashion  Exploration:     Hemostasis achieved with:  Direct pressure    Contaminated: no    Treatment:     Area cleansed with:  Hibiclens    Amount of cleaning:  Standard    Irrigation solution:  Sterile saline  Skin repair:     Repair method:  Staples    Number of staples:  4  Approximation:     Approximation:  Close    Vermilion border: well-aligned    Post-procedure details:     Dressing:  Open (no dressing)             ED Course  ED Course   Value Comment By Time   WBC: (!) 12.40 (Reviewed) GAURI Rome 03/07 7161    Discussed case with Dr. Moody.  REviewed EKG with Dr. Spear.  Patient is resting quietly now.  Pt and wife understand and concur with admission.  CTA chest is pending. GAURI Rome 03/07 3711      Recent Results (from the past 24 hour(s))   Comprehensive Metabolic Panel    Collection Time: 03/07/18  3:54 AM   Result Value Ref Range    Glucose 120 (H) 70 - 100 mg/dL    BUN 32 (H) 9 - 23 mg/dL    Creatinine 1.20 0.60 - 1.30 mg/dL    Sodium 141 132 - 146 mmol/L    Potassium 4.2 3.5 - 5.5 mmol/L    Chloride 112 (H) 99 - 109 mmol/L    CO2 25.0 20.0 - 31.0 mmol/L    Calcium 8.5 (L) 8.7 - 10.4 mg/dL    Total Protein 6.1 5.7 - 8.2 g/dL    Albumin 3.80 3.20 - 4.80 g/dL    ALT (SGPT) 24 7 - 40 U/L    AST (SGOT) 27 0 - 33 U/L    Alkaline Phosphatase 53 25 - 100 U/L    Total Bilirubin 0.5 0.3 - 1.2 mg/dL    eGFR Non African Amer 60 (L) >60 mL/min/1.73    Globulin 2.3 gm/dL    A/G Ratio 1.7 1.5 -  2.5 g/dL    BUN/Creatinine Ratio 26.7 (H) 7.0 - 25.0    Anion Gap 4.0 3.0 - 11.0 mmol/L   Magnesium    Collection Time: 03/07/18  3:54 AM   Result Value Ref Range    Magnesium 2.2 1.3 - 2.7 mg/dL   Light Blue Top    Collection Time: 03/07/18  3:54 AM   Result Value Ref Range    Extra Tube hold for add-on    Green Top (Gel)    Collection Time: 03/07/18  3:54 AM   Result Value Ref Range    Extra Tube Hold for add-ons.    Lavender Top    Collection Time: 03/07/18  3:54 AM   Result Value Ref Range    Extra Tube hold for add-on    Gold Top - SST    Collection Time: 03/07/18  3:54 AM   Result Value Ref Range    Extra Tube Hold for add-ons.    CBC Auto Differential    Collection Time: 03/07/18  3:54 AM   Result Value Ref Range    WBC 12.40 (H) 3.50 - 10.80 10*3/mm3    RBC 4.58 4.20 - 5.76 10*6/mm3    Hemoglobin 14.0 13.1 - 17.5 g/dL    Hematocrit 42.3 38.9 - 50.9 %    MCV 92.4 80.0 - 99.0 fL    MCH 30.6 27.0 - 31.0 pg    MCHC 33.1 32.0 - 36.0 g/dL    RDW 12.9 11.3 - 14.5 %    RDW-SD 43.1 37.0 - 54.0 fl    MPV 9.4 6.0 - 12.0 fL    Platelets 194 150 - 450 10*3/mm3    Neutrophil % 74.8 (H) 41.0 - 71.0 %    Lymphocyte % 14.3 (L) 24.0 - 44.0 %    Monocyte % 8.7 0.0 - 12.0 %    Eosinophil % 1.5 0.0 - 3.0 %    Basophil % 0.2 0.0 - 1.0 %    Immature Grans % 0.5 0.0 - 0.6 %    Neutrophils, Absolute 9.29 (H) 1.50 - 8.30 10*3/mm3    Lymphocytes, Absolute 1.77 0.60 - 4.80 10*3/mm3    Monocytes, Absolute 1.08 (H) 0.00 - 1.00 10*3/mm3    Eosinophils, Absolute 0.18 0.00 - 0.30 10*3/mm3    Basophils, Absolute 0.02 0.00 - 0.20 10*3/mm3    Immature Grans, Absolute 0.06 (H) 0.00 - 0.03 10*3/mm3   D-dimer, Quantitative    Collection Time: 03/07/18  3:54 AM   Result Value Ref Range    D-Dimer, Quantitative 0.70 (H) 0.00 - 0.50 mg/L (FEU)   Urinalysis With / Culture If Indicated - Urine, Clean Catch    Collection Time: 03/07/18  4:01 AM   Result Value Ref Range    Color, UA Yellow Yellow, Straw    Appearance, UA Clear Clear    pH, UA 5.5 5.0 -  8.0    Specific Gravity, UA 1.023 1.001 - 1.030    Glucose, UA Negative Negative    Ketones, UA Negative Negative    Bilirubin, UA Negative Negative    Blood, UA Negative Negative    Protein, UA Negative Negative    Leuk Esterase, UA Negative Negative    Nitrite, UA Negative Negative    Urobilinogen, UA 0.2 E.U./dL 0.2 - 1.0 E.U./dL   POC Troponin, Rapid    Collection Time: 03/07/18  4:08 AM   Result Value Ref Range    Troponin I 0.01 0.00 - 0.07 ng/mL     Note: In addition to lab results from this visit, the labs listed above may include labs taken at another facility or during a different encounter within the last 24 hours. Please correlate lab times with ED admission and discharge times for further clarification of the services performed during this visit.    XR Chest 1 View   Final Result      Early pneumonia and/or atelectasis RIGHT lung base with possible pleural    effusion.      No other acute finding or significant change since 03/06/2018.      THIS DOCUMENT HAS BEEN ELECTRONICALLY SIGNED BY EVIN BLOOM MD      CT Head Without Contrast   Final Result        No evidence of acute intracranial abnormality.      Additional nonacute findings as noted.         THIS DOCUMENT HAS BEEN ELECTRONICALLY SIGNED BY EVIN BLOOM MD      CT Angiogram Chest With & Without Contrast    (Results Pending)     Vitals:    03/07/18 0358 03/07/18 0400 03/07/18 0401 03/07/18 0402   BP: 154/71 131/67 131/67 123/69   BP Location:       Patient Position: Lying Sitting  Standing   Pulse: 65 69 69 75   Resp:       Temp:       TempSrc:       SpO2:   98%    Weight:       Height:         Medications   sodium chloride 0.9 % flush 10 mL (not administered)   cefTRIAXone (ROCEPHIN) IVPB 1 g (not administered)   AZITHROMYCIN 500 MG/250 ML 0.9% NS IVPB (MBP) (not administered)   sodium chloride 0.9 % infusion (250 mL/hr Intravenous New Bag 3/7/18 7863)   methylPREDNISolone sodium succinate (SOLU-Medrol) injection 125 mg (125 mg Intravenous  Given 3/7/18 0340)   ipratropium-albuterol (DUO-NEB) nebulizer solution 3 mL (3 mL Nebulization Given 3/7/18 0340)   HYDROcod Polst-CPM Polst ER (TUSSIONEX PENNKINETIC) 10-8 MG/5ML ER suspension 5 mL (5 mL Oral Given 3/7/18 0340)   lidocaine PF 1% (XYLOCAINE) injection 10 mL (10 mL Injection Given by Other 3/7/18 0436)     ECG/EMG Results (last 24 hours)     Procedure Component Value Units Date/Time    ECG 12 Lead [[id number]] Collected:  03/07/18 0308     Updated:  03/07/18 0308                    MDM    Final diagnoses:   Community acquired pneumonia of right lower lobe of lung   Syncope and collapse   Laceration of scalp, initial encounter            Chrissie Forrest, GAURI  03/07/18 5341

## 2018-03-07 NOTE — PROGRESS NOTES
Seen and examined at bedside.  H and P reviewed by partner, Dr. Moody and billed today.    Will ask Pulmonary Medicine for advice on CT findings for this patient.    Discussed with patient, wife, daughter in room today.

## 2018-03-07 NOTE — PLAN OF CARE
Problem: Patient Care Overview (Adult)  Goal: Adult Individualization and Mutuality  Outcome: Ongoing (interventions implemented as appropriate)      Problem: Fall Risk (Adult)  Goal: Absence of Falls  Outcome: Ongoing (interventions implemented as appropriate)   03/07/18 1424   Fall Risk (Adult)   Absence of Falls making progress toward outcome

## 2018-03-08 ENCOUNTER — APPOINTMENT (OUTPATIENT)
Dept: CT IMAGING | Facility: HOSPITAL | Age: 68
End: 2018-03-08

## 2018-03-08 LAB
ANION GAP SERPL CALCULATED.3IONS-SCNC: 8 MMOL/L (ref 3–11)
APPEARANCE FLD: ABNORMAL
APTT PPP: 25 SECONDS (ref 24–31)
BASOPHILS # BLD AUTO: 0.01 10*3/MM3 (ref 0–0.2)
BASOPHILS NFR BLD AUTO: 0.1 % (ref 0–1)
BUN BLD-MCNC: 23 MG/DL (ref 9–23)
BUN/CREAT SERPL: 23 (ref 7–25)
CALCIUM SPEC-SCNC: 8.7 MG/DL (ref 8.7–10.4)
CHLORIDE SERPL-SCNC: 109 MMOL/L (ref 99–109)
CO2 SERPL-SCNC: 20 MMOL/L (ref 20–31)
COLOR FLD: ABNORMAL
CREAT BLD-MCNC: 1 MG/DL (ref 0.6–1.3)
DEPRECATED RDW RBC AUTO: 43.2 FL (ref 37–54)
EOSINOPHIL # BLD AUTO: 0.01 10*3/MM3 (ref 0–0.3)
EOSINOPHIL NFR BLD AUTO: 0.1 % (ref 0–3)
EOSINOPHIL NFR FLD MANUAL: 4 %
ERYTHROCYTE [DISTWIDTH] IN BLOOD BY AUTOMATED COUNT: 12.9 % (ref 11.3–14.5)
FLUAV SUBTYP SPEC NAA+PROBE: NOT DETECTED
FLUBV RNA ISLT QL NAA+PROBE: NOT DETECTED
GFR SERPL CREATININE-BSD FRML MDRD: 74 ML/MIN/1.73
GLUCOSE BLD-MCNC: 121 MG/DL (ref 70–100)
GLUCOSE FLD-MCNC: 80 MG/DL
HCT VFR BLD AUTO: 36.8 % (ref 38.9–50.9)
HCT VFR FLD CALC: 48.4 %
HGB BLD-MCNC: 12.1 G/DL (ref 13.1–17.5)
HGB FLD-MCNC: 14.5 G/DL
IMM GRANULOCYTES # BLD: 0.08 10*3/MM3 (ref 0–0.03)
IMM GRANULOCYTES NFR BLD: 0.5 % (ref 0–0.6)
INR PPP: 0.98 (ref 0.91–1.09)
KOH PREP NAIL: NORMAL
LDH FLD-CCNC: 283 U/L
LYMPHOCYTES # BLD AUTO: 0.86 10*3/MM3 (ref 0.6–4.8)
LYMPHOCYTES NFR BLD AUTO: 4.9 % (ref 24–44)
LYMPHOCYTES NFR FLD MANUAL: 68 %
MCH RBC QN AUTO: 30.3 PG (ref 27–31)
MCHC RBC AUTO-ENTMCNC: 32.9 G/DL (ref 32–36)
MCV RBC AUTO: 92 FL (ref 80–99)
MONOCYTES # BLD AUTO: 1.39 10*3/MM3 (ref 0–1)
MONOCYTES NFR BLD AUTO: 7.9 % (ref 0–12)
MONOCYTES NFR FLD: 4 %
MRSA DNA SPEC QL NAA+PROBE: POSITIVE
NEUTROPHILS # BLD AUTO: 15.29 10*3/MM3 (ref 1.5–8.3)
NEUTROPHILS NFR BLD AUTO: 86.5 % (ref 41–71)
NEUTROPHILS NFR FLD MANUAL: 24 %
PH FLD: 7.4 [PH]
PLATELET # BLD AUTO: 187 10*3/MM3 (ref 150–450)
PMV BLD AUTO: 9.3 FL (ref 6–12)
POTASSIUM BLD-SCNC: 4.5 MMOL/L (ref 3.5–5.5)
PROCALCITONIN SERPL-MCNC: <0.05 NG/ML
PROT FLD-MCNC: 4 G/DL
PROTHROMBIN TIME: 10.3 SECONDS (ref 9.6–11.5)
RBC # BLD AUTO: 4 10*6/MM3 (ref 4.2–5.76)
RBC # FLD AUTO: ABNORMAL /MM3
SODIUM BLD-SCNC: 137 MMOL/L (ref 132–146)
WBC # FLD: 4740 /MM3
WBC NRBC COR # BLD: 17.64 10*3/MM3 (ref 3.5–10.8)

## 2018-03-08 PROCEDURE — 99233 SBSQ HOSP IP/OBS HIGH 50: CPT | Performed by: HOSPITALIST

## 2018-03-08 PROCEDURE — 88112 CYTOPATH CELL ENHANCE TECH: CPT | Performed by: INTERNAL MEDICINE

## 2018-03-08 PROCEDURE — 0W993ZX DRAINAGE OF RIGHT PLEURAL CAVITY, PERCUTANEOUS APPROACH, DIAGNOSTIC: ICD-10-PCS | Performed by: RADIOLOGY

## 2018-03-08 PROCEDURE — 89050 BODY FLUID CELL COUNT: CPT | Performed by: INTERNAL MEDICINE

## 2018-03-08 PROCEDURE — 99223 1ST HOSP IP/OBS HIGH 75: CPT | Performed by: INTERNAL MEDICINE

## 2018-03-08 PROCEDURE — 83986 ASSAY PH BODY FLUID NOS: CPT | Performed by: INTERNAL MEDICINE

## 2018-03-08 PROCEDURE — 82945 GLUCOSE OTHER FLUID: CPT | Performed by: INTERNAL MEDICINE

## 2018-03-08 PROCEDURE — 94799 UNLISTED PULMONARY SVC/PX: CPT

## 2018-03-08 PROCEDURE — 87102 FUNGUS ISOLATION CULTURE: CPT | Performed by: INTERNAL MEDICINE

## 2018-03-08 PROCEDURE — 75989 ABSCESS DRAINAGE UNDER X-RAY: CPT

## 2018-03-08 PROCEDURE — 94760 N-INVAS EAR/PLS OXIMETRY 1: CPT

## 2018-03-08 PROCEDURE — 85025 COMPLETE CBC W/AUTO DIFF WBC: CPT | Performed by: NURSE PRACTITIONER

## 2018-03-08 PROCEDURE — 83615 LACTATE (LD) (LDH) ENZYME: CPT | Performed by: INTERNAL MEDICINE

## 2018-03-08 PROCEDURE — 87641 MR-STAPH DNA AMP PROBE: CPT | Performed by: INTERNAL MEDICINE

## 2018-03-08 PROCEDURE — 87502 INFLUENZA DNA AMP PROBE: CPT | Performed by: INTERNAL MEDICINE

## 2018-03-08 PROCEDURE — 88305 TISSUE EXAM BY PATHOLOGIST: CPT | Performed by: INTERNAL MEDICINE

## 2018-03-08 PROCEDURE — 25010000002 CEFTRIAXONE PER 250 MG: Performed by: NURSE PRACTITIONER

## 2018-03-08 PROCEDURE — 85014 HEMATOCRIT: CPT | Performed by: HOSPITALIST

## 2018-03-08 PROCEDURE — 85610 PROTHROMBIN TIME: CPT | Performed by: RADIOLOGY

## 2018-03-08 PROCEDURE — 87015 SPECIMEN INFECT AGNT CONCNTJ: CPT | Performed by: INTERNAL MEDICINE

## 2018-03-08 PROCEDURE — 87206 SMEAR FLUORESCENT/ACID STAI: CPT | Performed by: INTERNAL MEDICINE

## 2018-03-08 PROCEDURE — 85730 THROMBOPLASTIN TIME PARTIAL: CPT | Performed by: RADIOLOGY

## 2018-03-08 PROCEDURE — 87633 RESP VIRUS 12-25 TARGETS: CPT | Performed by: INTERNAL MEDICINE

## 2018-03-08 PROCEDURE — 85018 HEMOGLOBIN: CPT | Performed by: HOSPITALIST

## 2018-03-08 PROCEDURE — 25010000002 HEPARIN (PORCINE) PER 1000 UNITS: Performed by: NURSE PRACTITIONER

## 2018-03-08 PROCEDURE — 87070 CULTURE OTHR SPECIMN AEROBIC: CPT | Performed by: INTERNAL MEDICINE

## 2018-03-08 PROCEDURE — 87220 TISSUE EXAM FOR FUNGI: CPT | Performed by: INTERNAL MEDICINE

## 2018-03-08 PROCEDURE — 84145 PROCALCITONIN (PCT): CPT | Performed by: INTERNAL MEDICINE

## 2018-03-08 PROCEDURE — 87075 CULTR BACTERIA EXCEPT BLOOD: CPT | Performed by: INTERNAL MEDICINE

## 2018-03-08 PROCEDURE — 87205 SMEAR GRAM STAIN: CPT | Performed by: INTERNAL MEDICINE

## 2018-03-08 PROCEDURE — 80048 BASIC METABOLIC PNL TOTAL CA: CPT | Performed by: NURSE PRACTITIONER

## 2018-03-08 PROCEDURE — 84157 ASSAY OF PROTEIN OTHER: CPT | Performed by: INTERNAL MEDICINE

## 2018-03-08 PROCEDURE — 94640 AIRWAY INHALATION TREATMENT: CPT

## 2018-03-08 PROCEDURE — 89051 BODY FLUID CELL COUNT: CPT | Performed by: INTERNAL MEDICINE

## 2018-03-08 PROCEDURE — 87116 MYCOBACTERIA CULTURE: CPT | Performed by: INTERNAL MEDICINE

## 2018-03-08 RX ORDER — LIDOCAINE HYDROCHLORIDE 10 MG/ML
10 INJECTION, SOLUTION INFILTRATION; PERINEURAL ONCE
Status: COMPLETED | OUTPATIENT
Start: 2018-03-08 | End: 2018-03-08

## 2018-03-08 RX ORDER — HYDROXYZINE HYDROCHLORIDE 25 MG/1
25 TABLET, FILM COATED ORAL ONCE
Status: COMPLETED | OUTPATIENT
Start: 2018-03-08 | End: 2018-03-08

## 2018-03-08 RX ADMIN — VITAMIN D, TAB 1000IU (100/BT) 1000 UNITS: 25 TAB at 08:08

## 2018-03-08 RX ADMIN — CEFTRIAXONE SODIUM 1 G: 1 INJECTION, SOLUTION INTRAVENOUS at 06:14

## 2018-03-08 RX ADMIN — DOXYCYCLINE 100 MG: 100 INJECTION, POWDER, LYOPHILIZED, FOR SOLUTION INTRAVENOUS at 21:10

## 2018-03-08 RX ADMIN — IPRATROPIUM BROMIDE AND ALBUTEROL SULFATE 3 ML: 2.5; .5 SOLUTION RESPIRATORY (INHALATION) at 03:40

## 2018-03-08 RX ADMIN — HYDROXYZINE HYDROCHLORIDE 25 MG: 25 TABLET, FILM COATED ORAL at 21:09

## 2018-03-08 RX ADMIN — DOXYCYCLINE 100 MG: 100 INJECTION, POWDER, LYOPHILIZED, FOR SOLUTION INTRAVENOUS at 08:08

## 2018-03-08 RX ADMIN — IPRATROPIUM BROMIDE AND ALBUTEROL SULFATE 3 ML: 2.5; .5 SOLUTION RESPIRATORY (INHALATION) at 23:05

## 2018-03-08 RX ADMIN — MULTIPLE VITAMINS W/ MINERALS TAB 1 TABLET: TAB ORAL at 08:08

## 2018-03-08 RX ADMIN — ATORVASTATIN CALCIUM 20 MG: 20 TABLET, FILM COATED ORAL at 21:09

## 2018-03-08 RX ADMIN — HEPARIN SODIUM 5000 UNITS: 5000 INJECTION, SOLUTION INTRAVENOUS; SUBCUTANEOUS at 21:09

## 2018-03-08 RX ADMIN — IPRATROPIUM BROMIDE AND ALBUTEROL SULFATE 3 ML: 2.5; .5 SOLUTION RESPIRATORY (INHALATION) at 16:13

## 2018-03-08 RX ADMIN — LIDOCAINE HYDROCHLORIDE 10 ML: 10 INJECTION, SOLUTION INFILTRATION; PERINEURAL at 13:55

## 2018-03-08 RX ADMIN — IPRATROPIUM BROMIDE AND ALBUTEROL SULFATE 3 ML: 2.5; .5 SOLUTION RESPIRATORY (INHALATION) at 12:38

## 2018-03-08 RX ADMIN — IPRATROPIUM BROMIDE AND ALBUTEROL SULFATE 3 ML: 2.5; .5 SOLUTION RESPIRATORY (INHALATION) at 18:57

## 2018-03-08 RX ADMIN — LEVOTHYROXINE SODIUM 75 MCG: 75 TABLET ORAL at 06:14

## 2018-03-08 RX ADMIN — DOCUSATE SODIUM 100 MG: 100 CAPSULE, LIQUID FILLED ORAL at 08:08

## 2018-03-08 RX ADMIN — ASPIRIN 325 MG: 325 TABLET, DELAYED RELEASE ORAL at 08:08

## 2018-03-08 RX ADMIN — HEPARIN SODIUM 5000 UNITS: 5000 INJECTION, SOLUTION INTRAVENOUS; SUBCUTANEOUS at 06:14

## 2018-03-08 RX ADMIN — IPRATROPIUM BROMIDE AND ALBUTEROL SULFATE 3 ML: 2.5; .5 SOLUTION RESPIRATORY (INHALATION) at 07:04

## 2018-03-08 NOTE — PROGRESS NOTES
[facility] Medicine Services  PROGRESS NOTE    Patient Name: Gee Whitney  : 1950  MRN: [id number]    Date of Admission: 3/7/2018  Length of Stay: 1  Primary Care Physician: Vicki Perry MD    Subjective   Subjective     CC:  Syncope / chest pain    HPI:  On oxygen today.  Appears he desaturated overnight and was placed on 2 L/min around 2-3am, likely from nocturnal desaturations.  Wife in room today.  No chest pain reported.    Review of Systems  Gen- No fevers, chills  CV- No chest pain, palpitations  Resp- No cough, dyspnea  GI- No N/V/D, abd pain    Otherwise ROS is negative except as mentioned in the HPI.    Objective   Objective     Vital Signs:   Temp:  [98 °F (36.7 °C)-98.7 °F (37.1 °C)] 98 °F (36.7 °C)  Heart Rate:  [71-97] 97  Resp:  [16-18] 17  BP: (141-153)/(73-78) 145/73     Physical Exam:  Constitutional: No acute distress, awake, alert  HENT: NCAT, no JVD  Respiratory: diminished right side, right sided wheezing, no crackles  Cardiovascular: RRR, s1 and s2  Gastrointestinal: Positive bowel sounds, soft, nontender, nondistended  Musculoskeletal: No bilateral ankle edema  Psychiatric: Appropriate affect, cooperative  Neurologic: Oriented x 3, strength symmetric in all extremities, Cranial Nerves grossly intact to confrontation, speech clear  Skin: No rashes    Results Reviewed:  I have personally reviewed current lab, radiology, and data and agree.      Results from last 7 days  Lab Units 18  0450 18  0354   WBC 10*3/mm3 17.64* 12.40*   HEMOGLOBIN g/dL 12.1* 14.0   HEMATOCRIT % 36.8* 42.3   PLATELETS 10*3/mm3 187 194       Results from last 7 days  Lab Units 18  0449 18  0354   SODIUM mmol/L 137 141   POTASSIUM mmol/L 4.5 4.2   CHLORIDE mmol/L 109 112*   CO2 mmol/L 20.0 25.0   BUN mg/dL 23 32*   CREATININE mg/dL 1.00 1.20   GLUCOSE mg/dL 121* 120*   CALCIUM mg/dL 8.7 8.5*   ALT (SGPT) U/L  --  24   AST (SGOT) U/L  --  27     Estimated  Creatinine Clearance: 76.1 mL/min (by C-G formula based on Cr of 1).  No results found for: BNP  No results found for: PHART    Microbiology Results Abnormal     Procedure Component Value - Date/Time    Blood Culture - Blood, [[id number]]  (Normal) Collected:  03/07/18 0445    Lab Status:  Preliminary result Specimen:  Blood from Arm, Right Updated:  03/08/18 0516     Blood Culture No growth at 24 hours    Blood Culture - Blood, [[id number]]  (Normal) Collected:  03/07/18 0503    Lab Status:  Preliminary result Specimen:  Blood from Arm, Left Updated:  03/08/18 0516     Blood Culture No growth at 24 hours          Imaging Results (last 24 hours)     ** No results found for the last 24 hours. **        I have reviewed the medications.    Assessment/Plan   Assessment / Plan     Hospital Problem List     * (Principal)Right lower lobe pneumonia    Coronary artery disease    Hypertension    Dyslipidemia    Hypothyroidism    Community acquired pneumonia of right lower lobe of lung        Brief Hospital Course to date:  Gee Whitney is a 67 y.o. male with chest pain and syncope.  Assessment & Plan:    SIRS  - unclear picture  - got SoluMedrol 125 on admission  - source of infection chest  Right Sided PNA  - probable right pna with parapneumonic effusion and bronchiectasis  - pleural thickening / peribronchial thickening / R hilar lymphadenopathy  - Pulmonary Medicine Evaluation  Syncope / Chest Pain / Head strike  - echo  - history of MI  CAD  - history of MI - coronary artery calcifications on seen on CT  - may need stress test in this former smoker of cigarettes once PNA better  - check Echo  Hypovolemia  - hypotensive and improved with IV fluids  - held blood pressure medication on admission  Scalp Lac / syncope / head strike  - will need staples removed  CKD  - unclear baseline  Hypothyroidism  - continue home medication  ? Chronic Sinusitis  - seen on head imaging  Nocturnal Desaturation  - had to be placed on  oxygen overnight by report from patient and nursing  - may benefit from sleep study    Pulmonary Medicine Eval    DVT Prophylaxis:  Heparin SC    CODE STATUS: Full Code    Disposition: I expect the patient to be discharged TBD      Electronically signed by Luis Martínez MD, 03/08/18, 10:45 AM.

## 2018-03-08 NOTE — CONSULTS
INTENSIVIST / PULMONARY INITIAL VISIT (CONSULT / H&P) NOTE     Hospital:  LOS: 1 day   Mr. Gee Whitney, 67 y.o. male is followed for:   Chief Complaint   Patient presents with   • Dizziness   • Head Laceration     Principal Problem:    Right lower lobe pneumonia  Active Problems:    Coronary artery disease    Hypertension    Dyslipidemia    Hypothyroidism    Community acquired pneumonia of right lower lobe of lung         History of Present Illness   68 y/o WM w/ limited smoking history (10 py, quit 1977), h/o MI 1977 no stents/cabg, HTN, HLD, hypothyroidism who started developing URI symptoms around 10 days ago.  Had severe cough, productive of yellow sputum.  Was placed on prednisone and azithromycin.  Has failed to improve symptomatically.  Coughed so hard his abdomen is sore.  Last night he had cold sweats and got up to go to the bathroom and had a syncopal episode.  No fever here, no weight loss, no hemoptysis, no chest pain.    Past Medical History:   Diagnosis Date   • Disease of thyroid gland    • Hyperlipidemia    • Hypertension    • Myocardial infarction      Past Surgical History:   Procedure Laterality Date   • HERNIA REPAIR     • TENDON RELEASE      Left thumb      History reviewed. No pertinent family history.  Social History     Social History   • Marital status:      Spouse name: N/A   • Number of children: N/A   • Years of education: N/A     Occupational History   • Not on file.     Social History Main Topics   • Smoking status: Former Smoker     Types: Cigarettes     Quit date: 1977   • Smokeless tobacco: Never Used   • Alcohol use Yes      Comment: occassionally    • Drug use: No   • Sexual activity: Defer     Other Topics Concern   • Not on file     Social History Narrative   • No narrative on file     No Known Allergies  No current facility-administered medications on file prior to encounter.      Current Outpatient Prescriptions on File Prior to Encounter   Medication Sig Dispense  Refill   • aspirin  MG tablet Take 325 mg by mouth Daily.     • atorvastatin (LIPITOR) 20 MG tablet Take 20 mg by mouth Daily.     • cholecalciferol (VITAMIN D3) 1000 UNITS tablet Take 1,000 Units by mouth Daily.     • Docusate Calcium (STOOL SOFTENER PO) Take  by mouth Daily.     • ezetimibe (ZETIA) 10 MG tablet Take 10 mg by mouth Daily.     • levothyroxine (SYNTHROID, LEVOTHROID) 75 MCG tablet Take 75 mcg by mouth Daily.     • lisinopril (PRINIVIL,ZESTRIL) 20 MG tablet Take 20 mg by mouth Daily.     • Multiple Vitamins-Minerals (MULTIVITAMIN ADULT PO) Take  by mouth Daily.     • Omega-3 Fatty Acids (FISH OIL) 1200 MG capsule delayed-release Take 1,200 mg by mouth Daily.         ROS:  Per HPI, all other systems were reviewed and were negative        OBJECTIVE     Vital Sign Min/Max for last 24 hours  Temp  Min: 98 °F (36.7 °C)  Max: 98.7 °F (37.1 °C)   BP  Min: 141/78  Max: 153/73   Pulse  Min: 71  Max: 97   Resp  Min: 16  Max: 18   SpO2  Min: 92 %  Max: 98 %   Flow (L/min)  Min: 2  Max: 2     Telemetry:  Sinus Rhythm: normal sinus rhythm           General Appearance:  Conversant, in no acute distress  Eyes:  No scleral icterus or pallor, pupils normal  Ears, Nose, Mouth, Throat:  Atraumatic, oropharynx clear  Neck:  Trachea midline, thyroid normal  Respiratory: diminisehd to auscultation right base, normal effort, no tenderness to palpation  Cardiovascular:  Regular rate and rhythm, no murmurs, no peripheral edema, no thrill  Gastrointestinal:  Soft, non-tender, non-distended, no hepatosplenomegaly  Skin:  Normal temperature, no rash  Psychiatric:  Alert and oriented x 3, normal judgement and insight  Neuro:  No new focal neurologic deficits observed    SpO2: 93 % SpO2  Min: 92 %  Max: 98 %   Device: nasal cannula    Flow Rate: 2 Flow (L/min)  Min: 2  Max: 2       Intake/Ouptut 24 hrs (7:00AM - 6:59 AM)  Intake & Output (last 3 days)       03/05 0701 - 03/06 0700 03/06 0701 - 03/07 0700 03/07 0701 - 03/08  0700 03/08 0701 - 03/09 0700    P.O.   1440     Total Intake(mL/kg)   1440 (18.7)     Urine (mL/kg/hr)   2950 (1.6) 900 (2.3)    Stool   0 (0)     Total Output     2950 900    Net     -1510 -900            Unmeasured Stool Occurrence   1 x             Lines, Drains & Airways    Active LDAs     Name:   Placement date:   Placement time:   Site:   Days:    Peripheral IV Line - Single Lumen 03/07/18 0830 median vein (underside of arm), left 20 gauge  03/07/18    0830      1                Hematology:    Results from last 7 days  Lab Units 03/08/18  0450 03/07/18  0354   WBC 10*3/mm3 17.64* 12.40*   HEMOGLOBIN g/dL 12.1* 14.0   HEMATOCRIT % 36.8* 42.3   PLATELETS 10*3/mm3 187 194     Electrolytes, Magnesium and Phosphorus:    Results from last 7 days  Lab Units 03/08/18  0449 03/07/18  0354   SODIUM mmol/L 137 141   POTASSIUM mmol/L 4.5 4.2   CO2 mmol/L 20.0 25.0   MAGNESIUM mg/dL  --  2.2     Renal:    Results from last 7 days  Lab Units 03/08/18  0449 03/07/18  0354   CREATININE mg/dL 1.00 1.20   BUN mg/dL 23 32*     Estimated Creatinine Clearance: 76.1 mL/min (by C-G formula based on Cr of 1).  Estimated Creatinine Clearance: 76.1 mL/min (by C-G formula based on Cr of 1).  Hepatic:    Results from last 7 days  Lab Units 03/07/18  0354   ALK PHOS U/L 53   BILIRUBIN mg/dL 0.5   ALT (SGPT) U/L 24   AST (SGOT) U/L 27     Arterial Blood Gases:              Lab Results   Component Value Date    LACTATE 1.9 03/07/2018       Ct Head Without Contrast    Result Date: 3/7/2018  Narrative: EXAM:   CT Head Without Intravenous Contrast EXAM DATE/TIME:   3/7/2018 3:20  AM CLINICAL HISTORY:   67 years old, male; Signs and symptoms; Dizziness; Patient HX: ? Syncopal episode that resulted in fall with subsequent midline posterior scalp lac; Additional info: Fall/dizzy/hit head/scalp laceration TECHNIQUE:   Axial computed tomography images of the head/brain without intravenous contrast.  All CT scans at this facility use one or more  dose reduction techniques, viz.: automated exposure control; ma/kV adjustment per patient size (including targeted exams where dose is matched to indication; i.e. head); or iterative reconstruction technique. COMPARISON:   No relevant prior studies available. FINDINGS:   Brain:  No CT evidence of mass, edema, or acute infarct.  No hemorrhage.  Possible developmental variation versus arachnoid cyst posterior fossa.   Ventricles:  No ventriculomegaly.    Bones/joints:  No acute bone abnormality.   Soft tissues:  Unremarkable as visualized.   Sinuses:  No fluid or acute sinusitis.  Chronic appearing mucoperiosteal thickening ethmoid sinuses.   Mastoid air cells:  Unremarkable as visualized.  No mastoid effusion.     Impression:   No evidence of acute intracranial abnormality. Additional nonacute findings as noted. THIS DOCUMENT HAS BEEN ELECTRONICALLY SIGNED BY EVIN BLOOM MD    Ct Angiogram Chest With & Without Contrast    Result Date: 3/7/2018  Narrative: EXAM:   CT Angiography Chest With Intravenous Contrast EXAM DATE/TIME:   3/7/2018 5:02 AM CLINICAL HISTORY:   67 years old, male; Laceration without foreign body of scalp, initial encounter; Lobar pneumonia, unspecified organism; Syncope and collapse; Pain; Chest pain; Left-sided chest pain; Additional info: Syncope, SOB, chest pain, elevated ddimer TECHNIQUE:   Axial computed tomographic angiography images of the chest with intravenous contrast using pulmonary embolism protocol.  All CT scans at this facility use one or more dose reduction techniques, viz.: automated exposure control; ma/kV adjustment per patient size (including targeted exams where dose is matched to indication; i.e. head); or iterative reconstruction technique.   MIP reconstructed images were created and reviewed. CONTRAST:   50 mL of isovue 370 administered intravenously. COMPARISON:   CR - XR CHEST 1 VW 2018-03-07 02:58 FINDINGS:   Pulmonary arteries:  No evidence of filling defects within  the visualized pulmonary arterial circulation to suggest pulmonary embolism.   Aorta:  Thoracic aorta demonstrates no dissection or aneurysm with motion artifact in the ascending thoracic aorta.  The vasculature including the coronary arteries demonstrates moderate atherosclerotic calcification.   Lungs:  Compressive atelectasis throughout the posterior RIGHT lung secondary to supine positioning and adjacent pleural effusion.  Although there is central peribronchial thickening in the perihilar regions, there is diffuse more peripheral peribronchial thickening RIGHT lung base.  Scattered pulmonary calcified granulomas.    Pleural space:  Moderate RIGHT pleural effusion layering posteriorly.  Diffuse thickening of the RIGHT medial pleural space and RIGHT pleural apex.    No LEFT pleural effusion.  No pneumothorax.   Heart:  Heart appears enlarged.  No pericardial effusion.  No evidence of RV dysfunction.   Mediastinum:  Small hiatal hernia with likely gastroesophageal reflux.   Bones/joints:  No acute bone abnormality.   Lymph nodes:  Possible 12 mm RIGHT infrahilar lymph node.  No additional significant lymphadenopathy.    Upper abdomen:  The visualized abdominal organs show no evidence of acute pathologic processes or injury.  Splenic calcified granulomas.  Likely bilateral renal cysts, incompletely characterized.     Impression: Moderate RIGHT pleural effusion with compressive atelectasis throughout the posterior lung. Peripheral peribronchial thickening RIGHT lung base possibly secondary to the atelectasis although inflammation, edema, or infiltrating process not excluded. Possible pleural thickening along the RIGHT mediastinum and RIGHT apex or small amount of loculated fluid extending into the RIGHT medial pleural space.  Although a pleural mass cannot be excluded, this finding was not demonstrated on the previous 2 view chest x-ray 3/6/2018 and appears to be fairly acute finding. Possible RIGHT hilar  "lymphadenopathy. Additional nonacute findings as noted. THIS DOCUMENT HAS BEEN ELECTRONICALLY SIGNED BY EVIN BLOOM MD    Xr Chest 1 View    Result Date: 3/7/2018  Narrative: EXAM:   XR Chest, 1 View EXAM DATE/TIME:   3/7/2018 3:06 AM CLINICAL HISTORY:   67 years old, male; Signs and symptoms; Cough and shortness of breath; Symptoms not specified; Patient HX: Weak/dizzy/ams triage protocol, dizziness head laceration patient states that he's having \"chest pressure across his chest and a cough x 1 week, but worse tonight. \" patient also states that he \"became dizzy and fell. \" laceration on the back of the patient's head. HX: Coronary artery disease hypertension dyslipidemia TECHNIQUE:   Single upright AP portable chest at 2:59 AM COMPARISON:   CR - XR CHEST PA AND LATERAL 2018-03-06 08:43 FINDINGS: Mild hazy density RIGHT lung base, new. Possible minimal RIGHT pleural effusion. No pulmonary edema, pneumothorax, or LEFT pleural effusion. Cardiomediastinal silhouette within normal limits. Mild degenerative changes of the spine. No acute bone abnormality.     Impression: Early pneumonia and/or atelectasis RIGHT lung base with possible pleural effusion. No other acute finding or significant change since 03/06/2018. THIS DOCUMENT HAS BEEN ELECTRONICALLY SIGNED BY EVIN BLOOM MD    Xr Chest Pa & Lateral    Result Date: 3/6/2018  Narrative: EXAMINATION: XR CHEST, PA AND LATERAL-03/06/2018:  INDICATION: Bronchitis; J40-Bronchitis, not specified as acute or chronic.  COMPARISON: NONE.  FINDINGS: There is a normal cardiac silhouette. There is no pulmonary inflammatory process. There is no mass or effusion.         Impression: No active disease.  D:  03/06/2018 E:  03/06/2018  This report was finalized on 3/6/2018 12:47 PM by Dr. Vasyl Franz MD.        Relevant imaging studies and labs from 03/08/18 were reviewed and interpreted by me    Medications (drips):    sodium chloride Last Rate: 250 mL/hr (03/07/18 0830)   sodium " "chloride Last Rate: 75 mL/hr (03/07/18 0923)         aspirin  mg Oral Daily   atorvastatin 20 mg Oral Nightly   ceftriaxone 1 g Intravenous Q24H   And      doxycycline 100 mg Intravenous Q12H   cholecalciferol 1,000 Units Oral Daily   docusate sodium 100 mg Oral Daily   heparin (porcine) 5,000 Units Subcutaneous Q8H   ipratropium-albuterol 3 mL Nebulization Q4H - RT   levothyroxine 75 mcg Oral Q AM   multivitamin with minerals 1 tablet Oral Daily       Assessment/Plan   IMPRESSION / PLAN     Inpatient Problem List:  67 y.o.male:  Hospital Problem List     * (Principal)Right lower lobe pneumonia    Coronary artery disease    Hypertension    Dyslipidemia    Hypothyroidism    Community acquired pneumonia of right lower lobe of lung           Impression:  67 y.o.male with relevant PMH of limited tobacco abuse, remote MI, HTN, HLD, URI sx x 10 days admitted 3/7/2018 w/ syncope, found to have moderate sized right pleural effusion and possible pleural thickening    Plan:  Pleural Effusion - recommend CT guided thoracentesis.  Differential includes parapneumonic effusion (viral / bacterial) vs malignancy vs hemothorax from cracked rib during coughing vs other etiologies.  From a strictly pulmonary standpoint these if he does well post procedure he could be discharged and follow up in clinic in 1 week with repeat CXR after thoracentesis.  I would like to see the initial labs prior to discharge to make sure we don't need to treat for complicated parapneumonic effusion, I.e. augmentin or levaquin etc.  I think the \"pleural thickening\" is probably just fluid tracking up.  Underlying lung parenchyma doesn't reveal an obvious abnormality or significant infiltrate.         Chris Matthews MD  Intensive Care Medicine  03/08/18 12:06 PM     Addendum:    Labs reviewed, bloody exudate. D/w Dr. Reddy who did thora, results consistent with hemothorax.  Still w/ moderate sized effusion, I suspect he coughed so hard he ruptured a " vessel and bled into the pleural space.  Will consult CTS, probably needs at least a large bore CT.    Chris Matthews MD  Pulmonology and Critical Care Medicine  03/08/18 4:34 PM  Electronically Signed

## 2018-03-08 NOTE — PLAN OF CARE
Problem: Fall Risk (Adult)  Goal: Identify Related Risk Factors and Signs and Symptoms  Outcome: Outcome(s) achieved Date Met: 03/08/18 03/08/18 1743   Fall Risk   Fall Risk: Related Risk Factors age-related changes;bladder function altered;history of falls;homeostatic imbalance;environment unfamiliar     Goal: Absence of Falls  Outcome: Ongoing (interventions implemented as appropriate)   03/08/18 1743   Fall Risk (Adult)   Absence of Falls making progress toward outcome       Problem: Pneumonia (Adult)  Goal: Signs and Symptoms of Listed Potential Problems Will be Absent or Manageable (Pneumonia)  Outcome: Ongoing (interventions implemented as appropriate)   03/08/18 1743   Pneumonia   Problems Assessed (Pneumonia) fluid/electrolyte imbalance;progression of infection;respiratory compromise   Problems Present (Pneumonia) progression of infection       Problem: Infection, Risk/Actual (Adult)  Goal: Identify Related Risk Factors and Signs and Symptoms  Outcome: Ongoing (interventions implemented as appropriate)   03/08/18 1743   Infection, Risk/Actual   Infection, Risk/Actual: Related Risk Factors chronic illness/condition;exposure to microbes;prolonged hospitalization;surgery/procedure   Signs and Symptoms (Infection, Risk/Actual) lab value changes;cultures positive     Goal: Infection Prevention/Resolution  Outcome: Ongoing (interventions implemented as appropriate)   03/08/18 1743   Infection, Risk/Actual (Adult)   Infection Prevention/Resolution making progress toward outcome

## 2018-03-08 NOTE — NURSING NOTE
Procedure complete.  15ml dark red fluid removed.  Pt tolerated well.  Report called to nurse on 5H

## 2018-03-08 NOTE — PROCEDURES
Radiology Procedure    Pre-procedure: Trace right pleural effusion with adjacent atelectasis      Procedure Performed: CT-guided right diagnostic thoracentesis      IV Sedation and/or Anesthesia:  No    Complications: None    Preliminary Findings: Trace right pleural effusion    Specimen Removed: 15 ml dark red viscous fluid     Estimated Blood Loss:  0ml    Post-Procedure Diagnosis: Probable trace right hemothorax     Post-Procedure Plan: Await pathology final results and return to ordering physician recommendations    Standard Discharge Instructions Given:yes     Avelino Reddy DO  03/08/18  2:25 PM

## 2018-03-09 ENCOUNTER — APPOINTMENT (OUTPATIENT)
Dept: GENERAL RADIOLOGY | Facility: HOSPITAL | Age: 68
End: 2018-03-09

## 2018-03-09 LAB
APPEARANCE FLD: ABNORMAL
COLOR FLD: ABNORMAL
EOSINOPHIL NFR FLD MANUAL: 1 %
GLUCOSE FLD-MCNC: 39 MG/DL
LDH FLD-CCNC: 1744 U/L
LYMPHOCYTES NFR FLD MANUAL: 34 %
MACROPHAGE FLUID: 5 %
MONOCYTES NFR FLD: 3 %
NEUTROPHILS NFR FLD MANUAL: 57 %
PROT FLD-MCNC: 4.4 G/DL
RBC # FLD AUTO: ABNORMAL /MM3
SP GR FLD: 1.03
WBC # FLD: 3278 /MM3

## 2018-03-09 PROCEDURE — 71045 X-RAY EXAM CHEST 1 VIEW: CPT

## 2018-03-09 PROCEDURE — 0W9930Z DRAINAGE OF RIGHT PLEURAL CAVITY WITH DRAINAGE DEVICE, PERCUTANEOUS APPROACH: ICD-10-PCS | Performed by: THORACIC SURGERY (CARDIOTHORACIC VASCULAR SURGERY)

## 2018-03-09 PROCEDURE — 87186 SC STD MICRODIL/AGAR DIL: CPT | Performed by: NURSE PRACTITIONER

## 2018-03-09 PROCEDURE — 84157 ASSAY OF PROTEIN OTHER: CPT | Performed by: PHYSICIAN ASSISTANT

## 2018-03-09 PROCEDURE — 94640 AIRWAY INHALATION TREATMENT: CPT

## 2018-03-09 PROCEDURE — 87070 CULTURE OTHR SPECIMN AEROBIC: CPT | Performed by: PHYSICIAN ASSISTANT

## 2018-03-09 PROCEDURE — 99233 SBSQ HOSP IP/OBS HIGH 50: CPT | Performed by: HOSPITALIST

## 2018-03-09 PROCEDURE — 87206 SMEAR FLUORESCENT/ACID STAI: CPT | Performed by: PHYSICIAN ASSISTANT

## 2018-03-09 PROCEDURE — 94799 UNLISTED PULMONARY SVC/PX: CPT

## 2018-03-09 PROCEDURE — 82945 GLUCOSE OTHER FLUID: CPT | Performed by: PHYSICIAN ASSISTANT

## 2018-03-09 PROCEDURE — 83615 LACTATE (LD) (LDH) ENZYME: CPT | Performed by: PHYSICIAN ASSISTANT

## 2018-03-09 PROCEDURE — 32550 INSERT PLEURAL CATH: CPT | Performed by: THORACIC SURGERY (CARDIOTHORACIC VASCULAR SURGERY)

## 2018-03-09 PROCEDURE — 87070 CULTURE OTHR SPECIMN AEROBIC: CPT | Performed by: NURSE PRACTITIONER

## 2018-03-09 PROCEDURE — 25010000002 CEFTRIAXONE PER 250 MG: Performed by: NURSE PRACTITIONER

## 2018-03-09 PROCEDURE — 99221 1ST HOSP IP/OBS SF/LOW 40: CPT | Performed by: THORACIC SURGERY (CARDIOTHORACIC VASCULAR SURGERY)

## 2018-03-09 PROCEDURE — 87015 SPECIMEN INFECT AGNT CONCNTJ: CPT | Performed by: PHYSICIAN ASSISTANT

## 2018-03-09 PROCEDURE — 99233 SBSQ HOSP IP/OBS HIGH 50: CPT | Performed by: INTERNAL MEDICINE

## 2018-03-09 PROCEDURE — 87798 DETECT AGENT NOS DNA AMP: CPT | Performed by: INTERNAL MEDICINE

## 2018-03-09 PROCEDURE — 84315 BODY FLUID SPECIFIC GRAVITY: CPT | Performed by: PHYSICIAN ASSISTANT

## 2018-03-09 PROCEDURE — 87205 SMEAR GRAM STAIN: CPT | Performed by: NURSE PRACTITIONER

## 2018-03-09 PROCEDURE — 89051 BODY FLUID CELL COUNT: CPT | Performed by: PHYSICIAN ASSISTANT

## 2018-03-09 PROCEDURE — 87077 CULTURE AEROBIC IDENTIFY: CPT | Performed by: NURSE PRACTITIONER

## 2018-03-09 PROCEDURE — 87102 FUNGUS ISOLATION CULTURE: CPT | Performed by: HOSPITALIST

## 2018-03-09 PROCEDURE — 25010000002 HEPARIN (PORCINE) PER 1000 UNITS: Performed by: NURSE PRACTITIONER

## 2018-03-09 PROCEDURE — 87075 CULTR BACTERIA EXCEPT BLOOD: CPT | Performed by: PHYSICIAN ASSISTANT

## 2018-03-09 PROCEDURE — 88112 CYTOPATH CELL ENHANCE TECH: CPT | Performed by: PHYSICIAN ASSISTANT

## 2018-03-09 PROCEDURE — 87205 SMEAR GRAM STAIN: CPT | Performed by: PHYSICIAN ASSISTANT

## 2018-03-09 PROCEDURE — 87116 MYCOBACTERIA CULTURE: CPT | Performed by: PHYSICIAN ASSISTANT

## 2018-03-09 PROCEDURE — 63710000001 PREDNISONE PER 1 MG: Performed by: INTERNAL MEDICINE

## 2018-03-09 RX ORDER — BUDESONIDE 0.5 MG/2ML
0.5 INHALANT ORAL
Status: DISCONTINUED | OUTPATIENT
Start: 2018-03-09 | End: 2018-03-13

## 2018-03-09 RX ORDER — HYDROCODONE BITARTRATE AND ACETAMINOPHEN 7.5; 325 MG/1; MG/1
1 TABLET ORAL EVERY 4 HOURS PRN
Status: DISCONTINUED | OUTPATIENT
Start: 2018-03-09 | End: 2018-03-14 | Stop reason: HOSPADM

## 2018-03-09 RX ORDER — PREDNISONE 20 MG/1
40 TABLET ORAL DAILY
Status: COMPLETED | OUTPATIENT
Start: 2018-03-09 | End: 2018-03-13

## 2018-03-09 RX ORDER — IPRATROPIUM BROMIDE AND ALBUTEROL SULFATE 2.5; .5 MG/3ML; MG/3ML
3 SOLUTION RESPIRATORY (INHALATION)
Status: DISCONTINUED | OUTPATIENT
Start: 2018-03-09 | End: 2018-03-13

## 2018-03-09 RX ORDER — SENNA AND DOCUSATE SODIUM 50; 8.6 MG/1; MG/1
2 TABLET, FILM COATED ORAL 2 TIMES DAILY
Status: DISCONTINUED | OUTPATIENT
Start: 2018-03-09 | End: 2018-03-14 | Stop reason: HOSPADM

## 2018-03-09 RX ORDER — ARFORMOTEROL TARTRATE 15 UG/2ML
15 SOLUTION RESPIRATORY (INHALATION)
Status: DISCONTINUED | OUTPATIENT
Start: 2018-03-09 | End: 2018-03-13

## 2018-03-09 RX ADMIN — DOCUSATE SODIUM 100 MG: 100 CAPSULE, LIQUID FILLED ORAL at 08:59

## 2018-03-09 RX ADMIN — HYDROCODONE BITARTRATE AND ACETAMINOPHEN 1 TABLET: 7.5; 325 TABLET ORAL at 08:59

## 2018-03-09 RX ADMIN — LEVOTHYROXINE SODIUM 75 MCG: 75 TABLET ORAL at 06:00

## 2018-03-09 RX ADMIN — CEFTRIAXONE SODIUM 1 G: 1 INJECTION, SOLUTION INTRAVENOUS at 06:00

## 2018-03-09 RX ADMIN — BUDESONIDE 0.5 MG: 0.5 INHALANT RESPIRATORY (INHALATION) at 18:47

## 2018-03-09 RX ADMIN — HEPARIN SODIUM 5000 UNITS: 5000 INJECTION, SOLUTION INTRAVENOUS; SUBCUTANEOUS at 21:57

## 2018-03-09 RX ADMIN — VITAMIN D, TAB 1000IU (100/BT) 1000 UNITS: 25 TAB at 08:59

## 2018-03-09 RX ADMIN — HYDROCODONE POLISTIREX AND CHLORPHENIRAMINE POLISTIREX 5 ML: 10; 8 SUSPENSION, EXTENDED RELEASE ORAL at 14:02

## 2018-03-09 RX ADMIN — DOXYCYCLINE 100 MG: 100 INJECTION, POWDER, LYOPHILIZED, FOR SOLUTION INTRAVENOUS at 08:59

## 2018-03-09 RX ADMIN — HYDROCODONE BITARTRATE AND ACETAMINOPHEN 1 TABLET: 7.5; 325 TABLET ORAL at 22:48

## 2018-03-09 RX ADMIN — HEPARIN SODIUM 5000 UNITS: 5000 INJECTION, SOLUTION INTRAVENOUS; SUBCUTANEOUS at 06:01

## 2018-03-09 RX ADMIN — HYDROCODONE BITARTRATE AND ACETAMINOPHEN 1 TABLET: 7.5; 325 TABLET ORAL at 14:05

## 2018-03-09 RX ADMIN — ATORVASTATIN CALCIUM 20 MG: 20 TABLET, FILM COATED ORAL at 21:57

## 2018-03-09 RX ADMIN — DOXYCYCLINE 100 MG: 100 INJECTION, POWDER, LYOPHILIZED, FOR SOLUTION INTRAVENOUS at 22:06

## 2018-03-09 RX ADMIN — HEPARIN SODIUM 5000 UNITS: 5000 INJECTION, SOLUTION INTRAVENOUS; SUBCUTANEOUS at 14:02

## 2018-03-09 RX ADMIN — ASPIRIN 325 MG: 325 TABLET, DELAYED RELEASE ORAL at 08:59

## 2018-03-09 RX ADMIN — IPRATROPIUM BROMIDE AND ALBUTEROL SULFATE 3 ML: 2.5; .5 SOLUTION RESPIRATORY (INHALATION) at 04:11

## 2018-03-09 RX ADMIN — HYDROCODONE POLISTIREX AND CHLORPHENIRAMINE POLISTIREX 5 ML: 10; 8 SUSPENSION, EXTENDED RELEASE ORAL at 21:58

## 2018-03-09 RX ADMIN — ARFORMOTEROL TARTRATE 15 MCG: 15 SOLUTION RESPIRATORY (INHALATION) at 18:59

## 2018-03-09 RX ADMIN — Medication 2 TABLET: at 21:57

## 2018-03-09 RX ADMIN — MULTIPLE VITAMINS W/ MINERALS TAB 1 TABLET: TAB ORAL at 08:59

## 2018-03-09 RX ADMIN — IPRATROPIUM BROMIDE AND ALBUTEROL SULFATE 3 ML: 2.5; .5 SOLUTION RESPIRATORY (INHALATION) at 18:47

## 2018-03-09 RX ADMIN — IPRATROPIUM BROMIDE AND ALBUTEROL SULFATE 3 ML: 2.5; .5 SOLUTION RESPIRATORY (INHALATION) at 12:58

## 2018-03-09 RX ADMIN — PREDNISONE 40 MG: 20 TABLET ORAL at 14:02

## 2018-03-09 NOTE — CONSULTS
Cardiothoracic Surgery History & Physical       Requesting physician: Luis Martínez    Chief complaint: Cough    HPI: 67-year-old  male with a history of hypertension, hyperlipidemia and remote tobacco abuse who presents with a cough.  Approximately 11 days ago, the patient developed a severe cough with occasional yellow sputum production.  This was associated with wheezing.  Three days ago, the patient was sitting in bed and suddenly had a moment of diaphoresis.  He stood to get a washcloth and had a syncopal event where he hit his head on the night stand.  The patient denies subsequent flank or chest pain.  He has not had fevers, chills, hemoptysis, weight loss or lymphadenopathy.      Past Medical History:   Diagnosis Date   • Disease of thyroid gland    • Hyperlipidemia    • Hypertension    • Myocardial infarction      Past Surgical History:   Procedure Laterality Date   • HERNIA REPAIR     • TENDON RELEASE      Left thumb      History reviewed. No pertinent family history.  Social History   Substance Use Topics   • Smoking status: Former Smoker     Types: Cigarettes     Quit date: 1977   • Smokeless tobacco: Never Used   • Alcohol use Yes      Comment: occassionally      Occupation: Retired from [employer]  Lives in [place], [place] with his wife  Prescriptions Prior to Admission   Medication Sig Dispense Refill Last Dose   • aspirin  MG tablet Take 325 mg by mouth Daily.   Taking   • atorvastatin (LIPITOR) 20 MG tablet Take 20 mg by mouth Daily.   Taking   • cholecalciferol (VITAMIN D3) 1000 UNITS tablet Take 1,000 Units by mouth Daily.   Taking   • Docusate Calcium (STOOL SOFTENER PO) Take  by mouth Daily.   Taking   • ezetimibe (ZETIA) 10 MG tablet Take 10 mg by mouth Daily.   Taking   • levothyroxine (SYNTHROID, LEVOTHROID) 75 MCG tablet Take 75 mcg by mouth Daily.   Taking   • lisinopril (PRINIVIL,ZESTRIL) 20 MG tablet Take 20 mg by mouth Daily.   Taking   • Multiple Vitamins-Minerals  (MULTIVITAMIN ADULT PO) Take  by mouth Daily.   Taking   • Omega-3 Fatty Acids (FISH OIL) 1200 MG capsule delayed-release Take 1,200 mg by mouth Daily.   Taking     Allergies:  Review of patient's allergies indicates no known allergies.    Review of Systems:    A comprehensive review of systems was negative except for:   Constitutional: positive for diaphoresis  Respiratory: positive for cough, sputum and wheezing  Cardiovascular: positive for cough and syncope  Gastrointestinal: positive for abdominal pain    All other systems were reviewed and are negative.    Vital Signs:  Temp:  [97.9 °F (36.6 °C)-98 °F (36.7 °C)] 97.9 °F (36.6 °C)  Heart Rate:  [] 65  Resp:  [14-22] 14  BP: (145-189)/(73-89) 187/87    Physical Exam:  Physical Exam   Constitutional: He is oriented to person, place, and time. He appears well-developed and well-nourished. No distress.   HENT:   Head: Normocephalic and atraumatic.   Eyes: Conjunctivae are normal. No scleral icterus.   Neck: Neck supple. No JVD present. No tracheal deviation present.   No carotid bruits bilaterally   Cardiovascular: Normal rate, regular rhythm and normal heart sounds.  Exam reveals no gallop and no friction rub.    No murmur heard.  Pulmonary/Chest: Effort normal and breath sounds normal. No respiratory distress. He has no wheezes. He has no rales.   Slight decreased breath sounds in the right lung fields   Abdominal: Soft. He exhibits no distension and no mass. There is no tenderness. There is no rebound and no guarding.   Musculoskeletal: Normal range of motion. He exhibits no edema.   Neurological: He is alert and oriented to person, place, and time.   Skin: Skin is warm and dry. No rash noted. He is not diaphoretic. No erythema.   Psychiatric: He has a normal mood and affect. His behavior is normal. Judgment and thought content normal.       Labs:    Results from last 7 days  Lab Units 03/08/18  0450   WBC 10*3/mm3 17.64*   HEMOGLOBIN g/dL 12.1*    HEMATOCRIT % 36.8*   PLATELETS 10*3/mm3 187       Results from last 7 days  Lab Units 03/08/18  0449   SODIUM mmol/L 137   POTASSIUM mmol/L 4.5   CHLORIDE mmol/L 109   CO2 mmol/L 20.0   BUN mg/dL 23   CREATININE mg/dL 1.00   GLUCOSE mg/dL 121*   CALCIUM mg/dL 8.7         Coagulation:   Lab Results   Component Value Date    INR 0.98 03/08/2018     Imaging:   -CTA of the chest performed 3/7/18, personally reviewed, demonstrates a moderate right posterior pleural effusion.  There is sone right peribronchial thickening, but no lymphadenopathy.  No pulmonary embolism.  There are no rib fractures.    -CXR performed 3/7/18, personally reviewed, demonstrates slight opacification of the right lower lung fields.  This is unimpressive as compared to the CT scan findings.  No cardiomegaly.    -CT head performed 3/7/18, demonstrates no intracranial mass or hemorrhage.      Assessment: 67 year old  male with a history of hypertension, hyperlipidemia and remote tobacco abuse who presents with a cough, wheezing and syncopal event.  He had a CT guided thoracentesis that yielded 15 cc of sanguinous fluid.  There is concern for a hemothorax.  This may be related to excessive coughing, trauma from the fall, simply a parapneumonic effusion or possible malignancy.        Plan: Will proceed with a large bore chest tube insertion at the bedside today.  The risks and benefits of the procedure were discussed with the patient including pain, bleeding, infection and undrained hemothorax.  The pleural effusion will be sent for cytology and culture.  If the pleural effusion/hemothorax is not drained with tube thoracostomy, will plan to proceed with bronchoscopy, right VATS and drainage of hemothorax.  The patient understood the above plan and agreed to proceed.            Chris Alexandra MD  03/09/18  5:00 AM

## 2018-03-09 NOTE — PROGRESS NOTES
Continued Stay Note  [facility]     Patient Name: Gee Whitney  MRN: [id number]  Today's Date: 3/9/2018    Admit Date: 3/7/2018          Discharge Plan       03/09/18 1422    Case Management/Social Work Plan    Plan discharge plan    Patient/Family In Agreement With Plan yes    Additional Comments Spoke with pt in room in regards to discharge planning. Pt denies discharge needs at this time. CM will cont to follow.              Discharge Codes     None        Expected Discharge Date and Time     Expected Discharge Date Expected Discharge Time    Mar 12, 2018             Nakita Pichardo RN

## 2018-03-09 NOTE — PLAN OF CARE
Problem: Patient Care Overview (Adult)  Goal: Plan of Care Review  Outcome: Ongoing (interventions implemented as appropriate)   03/09/18 0346   Coping/Psychosocial Response Interventions   Plan Of Care Reviewed With patient;spouse   Patient Care Overview   Progress progress toward functional goals as expected     Goal: Adult Individualization and Mutuality  Outcome: Ongoing (interventions implemented as appropriate)   03/09/18 0346   Individualization   Patient Specific Preferences door shut, lights off, prefers jogging shorts and gown   Patient Specific Goals go home   Patient Specific Interventions chest tube placement 3/9, owen hose, antibiotics, cardiac monitoring, monitor resp status     Goal: Discharge Needs Assessment  Outcome: Ongoing (interventions implemented as appropriate)   03/09/18 0346   Discharge Needs Assessment   Concerns To Be Addressed denies needs/concerns at this time   Readmission Within The Last 30 Days no previous admission in last 30 days   Equipment Needed After Discharge none   Discharge Disposition still a patient;home or self-care   Current Health   Anticipated Changes Related to Illness none   Self-Care   Equipment Currently Used at Home none   Living Environment   Transportation Available family or friend will provide       Problem: Fall Risk (Adult)  Goal: Absence of Falls  Outcome: Ongoing (interventions implemented as appropriate)   03/09/18 0346   Fall Risk (Adult)   Absence of Falls making progress toward outcome       Problem: Pneumonia (Adult)  Goal: Signs and Symptoms of Listed Potential Problems Will be Absent or Manageable (Pneumonia)  Outcome: Ongoing (interventions implemented as appropriate)   03/09/18 0346   Pneumonia   Problems Assessed (Pneumonia) all   Problems Present (Pneumonia) progression of infection       Problem: Infection, Risk/Actual (Adult)  Goal: Identify Related Risk Factors and Signs and Symptoms  Outcome: Outcome(s) achieved Date Met: 03/09/18 03/09/18  0346   Infection, Risk/Actual   Signs and Symptoms (Infection, Risk/Actual) lab value changes;heart rate increase     Goal: Infection Prevention/Resolution  Outcome: Ongoing (interventions implemented as appropriate)   03/09/18 0346   Infection, Risk/Actual (Adult)   Infection Prevention/Resolution making progress toward outcome

## 2018-03-09 NOTE — PROGRESS NOTES
[facility] Medicine Services  PROGRESS NOTE    Patient Name: Gee Whitney  : 1950  MRN: [id number]    Date of Admission: 3/7/2018  Length of Stay: 2  Primary Care Physician: Vicki Perry MD    Subjective   Subjective     CC:  FU dyspnea    HPI:  Family at bedside. Pt reports breathing better. Coughing a lot. +sputum. Abdominal strain with cough    Review of Systems  Gen- No fevers, chills  CV- No chest pain, palpitations  Resp- No cough, dyspnea  GI- No N/V/D, abd pain    Otherwise ROS is negative except as mentioned in the HPI.    Objective   Objective     Vital Signs:   Temp:  [97.7 °F (36.5 °C)-98.5 °F (36.9 °C)] 97.7 °F (36.5 °C)  Heart Rate:  [65-92] 88  Resp:  [14-18] 18  BP: (150-168)/(78-92) 164/78        Physical Exam:  Constitutional: No acute distress, awake, alert on RA  Eyes: PERRLA, sclerae anicteric, no conjunctival injection  HENT: NCAT, mucous membranes moist  Neck: Supple, no thyromegaly, no lymphadenopathy, trachea midline  Respiratory: Clear to auscultation bilaterally, nonlabored respirations   Cardiovascular: RRR, no murmurs, rubs, or gallops, palpable pedal pulses bilaterally  Gastrointestinal: Positive bowel sounds, soft, nontender, nondistended  Musculoskeletal: No bilateral ankle edema, no clubbing or cyanosis to extremities, R sided chest tube  Psychiatric: Appropriate affect, cooperative  Neurologic: Oriented x 3, strength symmetric in all extremities, Cranial Nerves grossly intact to confrontation, speech clear  Skin: No rashes    Results Reviewed:  I have personally reviewed current lab, radiology, and data and agree.      Results from last 7 days  Lab Units 18  1201 18  0450 18  0354   WBC 10*3/mm3  --  17.64* 12.40*   HEMOGLOBIN g/dL  --  12.1* 14.0   HEMATOCRIT %  --  36.8* 42.3   PLATELETS 10*3/mm3  --  187 194   INR  0.98  --   --        Results from last 7 days  Lab Units 18  0449 18  0354   SODIUM mmol/L  137 141   POTASSIUM mmol/L 4.5 4.2   CHLORIDE mmol/L 109 112*   CO2 mmol/L 20.0 25.0   BUN mg/dL 23 32*   CREATININE mg/dL 1.00 1.20   GLUCOSE mg/dL 121* 120*   CALCIUM mg/dL 8.7 8.5*   ALT (SGPT) U/L  --  24   AST (SGOT) U/L  --  27     No results found for: BNP  No results found for: PHART    Microbiology Results Abnormal     Procedure Component Value - Date/Time    Body Fluid Culture - Body Fluid, Pleural Cavity [[id number]] Collected:  03/09/18 1000    Lab Status:  Preliminary result Specimen:  Body Fluid from Pleural Cavity Updated:  03/09/18 1455     Gram Stain Result Many (4+) Red blood cells      Rare (1+) WBCs seen      No organisms seen    Anaerobic Culture - Body Fluid, Pleural Cavity [[id number]]  (Normal) Collected:  03/09/18 1000    Lab Status:  Preliminary result Specimen:  Body Fluid from Pleural Cavity Updated:  03/09/18 1305     Culture No anaerobes isolated    Anaerobic Culture - Pleural Fluid, Pleural Cavity [[id number]]  (Normal) Collected:  03/08/18 1409    Lab Status:  Preliminary result Specimen:  Pleural Fluid from Pleural Cavity Updated:  03/09/18 1304     Culture No anaerobes isolated    AFB Culture - Body Fluid, Pleural Cavity [[id number]] Collected:  03/08/18 1408    Lab Status:  Preliminary result Specimen:  Body Fluid from Pleural Cavity Updated:  03/09/18 1125     AFB Stain No acid fast bacilli seen on concentrated smear    Body Fluid Culture - Body Fluid, Pleural Cavity [[id number]]  (Normal) Collected:  03/08/18 1408    Lab Status:  Preliminary result Specimen:  Body Fluid from Pleural Cavity Updated:  03/09/18 0849     BF Culture No growth     Gram Stain Result Occasional WBCs seen      No organisms seen    Blood Culture - Blood, [[id number]]  (Normal) Collected:  03/07/18 0503    Lab Status:  Preliminary result Specimen:  Blood from Arm, Left Updated:  03/09/18 0516     Blood Culture No growth at 2 days    Blood Culture - Blood, [[id number]]  (Normal) Collected:  03/07/18 0445    Lab  Status:  Preliminary result Specimen:  Blood from Arm, Right Updated:  03/09/18 0516     Blood Culture No growth at 2 days    MRSA Screen, PCR - Swab, Nares [[id number]]  (Abnormal) Collected:  03/08/18 1605    Lab Status:  Final result Specimen:  Swab from Nares Updated:  03/08/18 1724     MRSA, PCR Positive (C)    Influenza A & B, RT PCR - Swab, Nasopharynx [[id number]]  (Normal) Collected:  03/08/18 1605    Lab Status:  Final result Specimen:  Swab from Nasopharynx Updated:  03/08/18 1648     Influenza A PCR Not Detected     Influenza B PCR Not Detected    KOH Prep - Body Fluid, Chest [[id number]]  (Normal) Collected:  03/08/18 1409    Lab Status:  Final result Specimen:  Body Fluid from Chest Updated:  03/08/18 1638     KOH Prep No yeast or hyphal elements seen          Imaging Results (last 24 hours)     Procedure Component Value Units Date/Time    XR Chest 1 View [[id number]] Collected:  03/09/18 1013     Updated:  03/09/18 1021    Narrative:       EXAMINATION: XR CHEST 1 VW-03/09/2018:      INDICATION: Postop; J18.1-Lobar pneumonia, unspecified organism;  R55-Syncope and collapse; S01.01XA-Laceration without foreign body of  scalp, initial encounter.      COMPARISON: 03/07/2018.     FINDINGS: Portable chest reveals a chest tube identified on the right.  There is no definite pneumothorax. Slight volume loss on the right. The  heart is enlarged. The left lung is clear.           Impression:       Chest tube on the right with slight volume loss and no  evidence of pneumothorax.     D:  03/09/2018  E:  03/09/2018     This report was finalized on 3/9/2018 10:19 AM by Dr. Claudia Renteria MD.                I have reviewed the medications.    Assessment/Plan   Assessment / Plan     Hospital Problem List     * (Principal)Right lower lobe pneumonia    Coronary artery disease    Hypertension    Dyslipidemia    Hypothyroidism    Community acquired pneumonia of right lower lobe of lung           Brief Hospital Course to  date:  Gee Whitney is a 67 y.o. male PMH of limited tobacco abuse, remote MI, HTN, HLD, URI sx x 10 days admitted 3/7/2018 w/ syncope, found to have moderate sized right pleural effusion and possible pleural thickening    Assessment & Plan:  - R sided hemothorax from a fall: s/p chest tube placement 3/9. No air leak  - CAP with reactive airway disease: Ceftriaxone, doxycycline, prednisone, nebs    DVT Prophylaxis:  [facility]    CODE STATUS: Full Code    Disposition: I expect the patient to be discharged TBCLAY Dupont MD  03/09/18  8:41 PM

## 2018-03-09 NOTE — PROGRESS NOTES
INTENSIVIST / PULMONARY FOLLOW UP NOTE     Hospital:  LOS: 2 days   Mr. Gee Whitney, 67 y.o. male is followed for:   Principal Problem:    Right lower lobe pneumonia  Active Problems:    Coronary artery disease    Hypertension    Dyslipidemia    Hypothyroidism    Community acquired pneumonia of right lower lobe of lung          SUBJECTIVE   S/p chest tube, I don't see documentation of amoutn drained in nursing flow sheet but there was ~600 cc in atrium.  Still with severe paroxysmal coughing spells.    The patient's relevant past medical, surgical, family, and social history were reviewed    Allergies and medications were reviewed    ROS:  Per subjective, all other systems were reviewed and were negative        OBJECTIVE     Vital Sign Min/Max for last 24 hours:  Temp  Min: 97.9 °F (36.6 °C)  Max: 98.5 °F (36.9 °C)   BP  Min: 156/84  Max: 187/87   Pulse  Min: 65  Max: 99   Resp  Min: 14  Max: 20   SpO2  Min: 90 %  Max: 95 %   No Data Recorded     Physical Exam:  General Appearance:  Conversant, in no acute distress  Eyes:  No scleral icterus or pallor, pupils normal  Ears, Nose, Mouth, Throat:  Atraumatic, oropharynx clear  Neck:  Trachea midline, thyroid normal  Respiratory:  Wheezing to auscultation bilaterally, normal effort, no tenderness to palpation  Cardiovascular:  Regular rate and rhythm, no murmurs, no peripheral edema, no thrill  Gastrointestinal:  Soft, non-tender, non-distended, no hepatosplenomegaly  Skin:  Normal temperature, no rash  Psychiatric:  Alert and oriented x 3, normal judgement and insight  Neuro:  No new focal neurologic deficits observed    Telemetry:  Sinus Rhythm: normal sinus rhythm           Hemodynamics:   CVP:     PAP:     PAOP:     CO:     CI:     SVI:     SVR:       SpO2: 94 % SpO2  Min: 90 %  Max: 95 %   Device: room air    Flow Rate: 2 No Data Recorded     Mechanical Ventilator Settings:                                         Intake/Ouptut 24 hrs (7:00AM - 6:59 AM)  Intake  & Output (last 3 days)       03/06 0701 - 03/07 0700 03/07 0701 - 03/08 0700 03/08 0701 - 03/09 0700 03/09 0701 - 03/10 0700    P.O.  1440  240    I.V. (mL/kg)   710.5 (9.2)     IV Piggyback   50     Total Intake(mL/kg)  1440 (18.7) 760.5 (9.9) 240 (3.1)    Urine (mL/kg/hr)  2950 (1.6) 5250 (2.8) 1400 (2.3)    Stool  0 (0)      Total Output   2950 5250 1400    Net   -1510 -4489.5 -1160            Unmeasured Stool Occurrence  1 x            Lines, Drains & Airways    Active LDAs     Name:   Placement date:   Placement time:   Site:   Days:    Peripheral IV Line - Single Lumen 03/07/18 0830 median vein (underside of arm), left 20 gauge  03/07/18    0830      2    Chest Tube 03/09/18 0810 other (see comments) lateral  03/09/18    0810      less than 1                Hematology:    Results from last 7 days  Lab Units 03/08/18  0450 03/07/18  0354   WBC 10*3/mm3 17.64* 12.40*   HEMOGLOBIN g/dL 12.1* 14.0   HEMATOCRIT % 36.8* 42.3   PLATELETS 10*3/mm3 187 194     Electrolytes, Magnesium and Phosphorus:    Results from last 7 days  Lab Units 03/08/18  0449 03/07/18  0354   SODIUM mmol/L 137 141   CHLORIDE mmol/L 109 112*   POTASSIUM mmol/L 4.5 4.2   CO2 mmol/L 20.0 25.0   MAGNESIUM mg/dL  --  2.2     Renal:    Results from last 7 days  Lab Units 03/08/18  0449 03/07/18  0354   CREATININE mg/dL 1.00 1.20   BUN mg/dL 23 32*     Estimated Creatinine Clearance: 76.1 mL/min (by C-G formula based on Cr of 1).  Hepatic:    Results from last 7 days  Lab Units 03/07/18  0354   ALK PHOS U/L 53   BILIRUBIN mg/dL 0.5   ALT (SGPT) U/L 24   AST (SGOT) U/L 27     Arterial Blood Gases:              Lab Results   Component Value Date    LACTATE 1.9 03/07/2018       Relevant imaging studies and labs from 03/09/18 were reviewed and interpreted by me    Medications (drips):    sodium chloride Last Rate: 250 mL/hr (03/07/18 0830)   sodium chloride Last Rate: 75 mL/hr (03/07/18 0923)         arformoterol 15 mcg Nebulization BID - RT   aspirin   mg Oral Daily   atorvastatin 20 mg Oral Nightly   budesonide 0.5 mg Nebulization BID - RT   ceftriaxone 1 g Intravenous Q24H   And      doxycycline 100 mg Intravenous Q12H   cholecalciferol 1,000 Units Oral Daily   heparin (porcine) 5,000 Units Subcutaneous Q8H   HYDROcod Polst-CPM Polst ER 5 mL Oral BID   ipratropium-albuterol 3 mL Nebulization Q6H While Awake - RT   levothyroxine 75 mcg Oral Q AM   multivitamin with minerals 1 tablet Oral Daily   predniSONE 40 mg Oral Daily   sennosides-docusate sodium 2 tablet Oral BID       Assessment/Plan   IMPRESSION / PLAN     Inpatient Problem List:  67 y.o.male:  Hospital Problem List     * (Principal)Right lower lobe pneumonia    Coronary artery disease    Hypertension    Dyslipidemia    Hypothyroidism    Community acquired pneumonia of right lower lobe of lung           Impression:  67 y.o.male with relevant PMH of limited tobacco abuse, remote MI, HTN, HLD, URI sx x 10 days as well as severe coughing paroxysms admitted 3/7/2018 w/ syncope, found to have moderate sized right pleural effusion and possible pleural thickening.  Thoracentesis c/w hemothorax.  Now s/p large bore CT this morning.      Plan:  Cough - I think he has a severe viral bronchitis that precipitated all of this.  I agree with completing a course of Rocephin and Doxy to cover CAP.  Respiratory viral PCR pending, add pertussis PCR.  Cough suppression with Tussionex.  Also appears to have a component of RADS with bilateral wheezing.  Add brovana/pulmicort, give pred 40 mg x 5 days.    Hemothorax - most likely trauma related from above.  CTS appreciated.  F/u cytology.    Will see again Monday, call if questions over the weekend    Nutrition - Diet Regular; Cardiac         Chris Matthews MD  Intensive Care Medicine  03/09/18 3:02 PM

## 2018-03-10 ENCOUNTER — APPOINTMENT (OUTPATIENT)
Dept: GENERAL RADIOLOGY | Facility: HOSPITAL | Age: 68
End: 2018-03-10

## 2018-03-10 LAB
ALBUMIN SERPL-MCNC: 3.7 G/DL (ref 3.2–4.8)
ANION GAP SERPL CALCULATED.3IONS-SCNC: 8 MMOL/L (ref 3–11)
BASOPHILS # BLD AUTO: 0 10*3/MM3 (ref 0–0.2)
BASOPHILS NFR BLD AUTO: 0 % (ref 0–1)
BUN BLD-MCNC: 24 MG/DL (ref 9–23)
BUN/CREAT SERPL: 24 (ref 7–25)
CALCIUM SPEC-SCNC: 9.1 MG/DL (ref 8.7–10.4)
CHLORIDE SERPL-SCNC: 105 MMOL/L (ref 99–109)
CO2 SERPL-SCNC: 22 MMOL/L (ref 20–31)
CREAT BLD-MCNC: 1 MG/DL (ref 0.6–1.3)
DEPRECATED RDW RBC AUTO: 43.7 FL (ref 37–54)
EOSINOPHIL # BLD AUTO: 0 10*3/MM3 (ref 0–0.3)
EOSINOPHIL NFR BLD AUTO: 0 % (ref 0–3)
ERYTHROCYTE [DISTWIDTH] IN BLOOD BY AUTOMATED COUNT: 13.3 % (ref 11.3–14.5)
GFR SERPL CREATININE-BSD FRML MDRD: 74 ML/MIN/1.73
GLUCOSE BLD-MCNC: 122 MG/DL (ref 70–100)
HCT VFR BLD AUTO: 38.2 % (ref 38.9–50.9)
HGB BLD-MCNC: 12.7 G/DL (ref 13.1–17.5)
IMM GRANULOCYTES # BLD: 0.09 10*3/MM3 (ref 0–0.03)
IMM GRANULOCYTES NFR BLD: 0.6 % (ref 0–0.6)
LDH SERPL-CCNC: 204 U/L (ref 120–246)
LYMPHOCYTES # BLD AUTO: 0.87 10*3/MM3 (ref 0.6–4.8)
LYMPHOCYTES NFR BLD AUTO: 5.7 % (ref 24–44)
MCH RBC QN AUTO: 30.2 PG (ref 27–31)
MCHC RBC AUTO-ENTMCNC: 33.2 G/DL (ref 32–36)
MCV RBC AUTO: 91 FL (ref 80–99)
MONOCYTES # BLD AUTO: 1.01 10*3/MM3 (ref 0–1)
MONOCYTES NFR BLD AUTO: 6.6 % (ref 0–12)
NEUTROPHILS # BLD AUTO: 13.29 10*3/MM3 (ref 1.5–8.3)
NEUTROPHILS NFR BLD AUTO: 87.1 % (ref 41–71)
PHOSPHATE SERPL-MCNC: 5.1 MG/DL (ref 2.4–5.1)
PLATELET # BLD AUTO: 223 10*3/MM3 (ref 150–450)
PMV BLD AUTO: 9.7 FL (ref 6–12)
POTASSIUM BLD-SCNC: 4.6 MMOL/L (ref 3.5–5.5)
RBC # BLD AUTO: 4.2 10*6/MM3 (ref 4.2–5.76)
SODIUM BLD-SCNC: 135 MMOL/L (ref 132–146)
WBC NRBC COR # BLD: 15.26 10*3/MM3 (ref 3.5–10.8)

## 2018-03-10 PROCEDURE — 80069 RENAL FUNCTION PANEL: CPT | Performed by: HOSPITALIST

## 2018-03-10 PROCEDURE — 85025 COMPLETE CBC W/AUTO DIFF WBC: CPT | Performed by: HOSPITALIST

## 2018-03-10 PROCEDURE — 94799 UNLISTED PULMONARY SVC/PX: CPT

## 2018-03-10 PROCEDURE — 71045 X-RAY EXAM CHEST 1 VIEW: CPT

## 2018-03-10 PROCEDURE — 25010000002 HEPARIN (PORCINE) PER 1000 UNITS: Performed by: NURSE PRACTITIONER

## 2018-03-10 PROCEDURE — 63710000001 PREDNISONE PER 1 MG: Performed by: INTERNAL MEDICINE

## 2018-03-10 PROCEDURE — 94640 AIRWAY INHALATION TREATMENT: CPT

## 2018-03-10 PROCEDURE — 83615 LACTATE (LD) (LDH) ENZYME: CPT | Performed by: HOSPITALIST

## 2018-03-10 PROCEDURE — 99232 SBSQ HOSP IP/OBS MODERATE 35: CPT | Performed by: HOSPITALIST

## 2018-03-10 PROCEDURE — 25010000002 CEFTRIAXONE PER 250 MG: Performed by: NURSE PRACTITIONER

## 2018-03-10 PROCEDURE — 94760 N-INVAS EAR/PLS OXIMETRY 1: CPT

## 2018-03-10 RX ORDER — LIDOCAINE 50 MG/G
1 PATCH TOPICAL
Status: DISCONTINUED | OUTPATIENT
Start: 2018-03-10 | End: 2018-03-14 | Stop reason: HOSPADM

## 2018-03-10 RX ADMIN — Medication 2 TABLET: at 21:27

## 2018-03-10 RX ADMIN — Medication 2 TABLET: at 08:04

## 2018-03-10 RX ADMIN — HYDROCODONE POLISTIREX AND CHLORPHENIRAMINE POLISTIREX 5 ML: 10; 8 SUSPENSION, EXTENDED RELEASE ORAL at 08:04

## 2018-03-10 RX ADMIN — HEPARIN SODIUM 5000 UNITS: 5000 INJECTION, SOLUTION INTRAVENOUS; SUBCUTANEOUS at 21:27

## 2018-03-10 RX ADMIN — HYDROCODONE POLISTIREX AND CHLORPHENIRAMINE POLISTIREX 5 ML: 10; 8 SUSPENSION, EXTENDED RELEASE ORAL at 21:27

## 2018-03-10 RX ADMIN — CEFTRIAXONE SODIUM 1 G: 1 INJECTION, SOLUTION INTRAVENOUS at 06:16

## 2018-03-10 RX ADMIN — BUDESONIDE 0.5 MG: 0.5 INHALANT RESPIRATORY (INHALATION) at 19:33

## 2018-03-10 RX ADMIN — Medication 5 MG: at 21:27

## 2018-03-10 RX ADMIN — VITAMIN D, TAB 1000IU (100/BT) 1000 UNITS: 25 TAB at 08:04

## 2018-03-10 RX ADMIN — PREDNISONE 40 MG: 20 TABLET ORAL at 08:04

## 2018-03-10 RX ADMIN — HYDROCODONE BITARTRATE AND ACETAMINOPHEN 1 TABLET: 7.5; 325 TABLET ORAL at 08:08

## 2018-03-10 RX ADMIN — LEVOTHYROXINE SODIUM 75 MCG: 75 TABLET ORAL at 06:16

## 2018-03-10 RX ADMIN — ATORVASTATIN CALCIUM 20 MG: 20 TABLET, FILM COATED ORAL at 21:27

## 2018-03-10 RX ADMIN — MULTIPLE VITAMINS W/ MINERALS TAB 1 TABLET: TAB ORAL at 08:04

## 2018-03-10 RX ADMIN — HEPARIN SODIUM 5000 UNITS: 5000 INJECTION, SOLUTION INTRAVENOUS; SUBCUTANEOUS at 13:43

## 2018-03-10 RX ADMIN — ARFORMOTEROL TARTRATE 15 MCG: 15 SOLUTION RESPIRATORY (INHALATION) at 07:10

## 2018-03-10 RX ADMIN — IPRATROPIUM BROMIDE AND ALBUTEROL SULFATE 3 ML: 2.5; .5 SOLUTION RESPIRATORY (INHALATION) at 13:48

## 2018-03-10 RX ADMIN — LIDOCAINE 1 PATCH: 50 PATCH CUTANEOUS at 13:42

## 2018-03-10 RX ADMIN — ARFORMOTEROL TARTRATE 15 MCG: 15 SOLUTION RESPIRATORY (INHALATION) at 19:33

## 2018-03-10 RX ADMIN — IPRATROPIUM BROMIDE AND ALBUTEROL SULFATE 3 ML: 2.5; .5 SOLUTION RESPIRATORY (INHALATION) at 07:06

## 2018-03-10 RX ADMIN — HYDROCODONE BITARTRATE AND ACETAMINOPHEN 1 TABLET: 7.5; 325 TABLET ORAL at 21:27

## 2018-03-10 RX ADMIN — BUDESONIDE 0.5 MG: 0.5 INHALANT RESPIRATORY (INHALATION) at 07:12

## 2018-03-10 RX ADMIN — ASPIRIN 325 MG: 325 TABLET, DELAYED RELEASE ORAL at 08:04

## 2018-03-10 RX ADMIN — HEPARIN SODIUM 5000 UNITS: 5000 INJECTION, SOLUTION INTRAVENOUS; SUBCUTANEOUS at 06:16

## 2018-03-10 RX ADMIN — IPRATROPIUM BROMIDE AND ALBUTEROL SULFATE 3 ML: 2.5; .5 SOLUTION RESPIRATORY (INHALATION) at 19:33

## 2018-03-10 NOTE — PROGRESS NOTES
"Gee Whitney  [id number]  1950     LOS: 3 days   Patient Care Team:  Vicki Perry MD as PCP - General  Vicki Perry MD as PCP - Family Medicine    Chief Complaint: Right chest tube    POD 1 and chest tube placement  Subjective: Denies shortness of breath    Objective:     Vital Sign Min/Max for last 24 hours  Temp  Min: 97.7 °F (36.5 °C)  Max: 99.6 °F (37.6 °C)   BP  Min: 150/89  Max: 164/98   Pulse  Min: 70  Max: 90   Resp  Min: 16  Max: 20   SpO2  Min: 91 %  Max: 96 %   No Data Recorded   No Data Recorded     Flowsheet Rows    Flowsheet Row First Filed Value   Admission Height 175.3 cm (69\") Documented at 03/07/2018 0303   Admission Weight 77.1 kg (170 lb) Documented at 03/07/2018 0303          Physical Exam:Max 99.6, alert and oriented ×3  CV regular rate and rhythm  Lungs clear bilaterally  Wound:    Pulses:     Mediastinal and Chest Tube Drainage: No air leak 20 mL of drainage for 12 hours       Results Review:     Results from last 7 days  Lab Units 03/10/18  0404   WBC 10*3/mm3 15.26*   HEMOGLOBIN g/dL 12.7*   HEMATOCRIT % 38.2*   PLATELETS 10*3/mm3 223       Results from last 7 days  Lab Units 03/10/18  0404   SODIUM mmol/L 135   POTASSIUM mmol/L 4.6   CHLORIDE mmol/L 105   CO2 mmol/L 22.0   BUN mg/dL 24*   CREATININE mg/dL 1.00   GLUCOSE mg/dL 122*   CALCIUM mg/dL 9.1             Assessment    Principal Problem:    Right lower lobe pneumonia  Active Problems:    Coronary artery disease    Hypertension    Dyslipidemia    Hypothyroidism    Community acquired pneumonia of right lower lobe of lung      Continue with chest tube that was placed yesterday  X-ray acceptable.    Await cultures from chest tube fluid        Marko Campuzano PA-C  03/10/18  8:14 AM      Please note that portions of this note were completed with a voice recognition program. Efforts were made to edit the dictations, but words may be mistranscribed  "

## 2018-03-10 NOTE — PROGRESS NOTES
[facility] Medicine Services  PROGRESS NOTE    Patient Name: Gee Whitney  : 1950  MRN: [id number]    Date of Admission: 3/7/2018  Length of Stay: 3  Primary Care Physician: Vicki Perry MD    Subjective   Subjective     CC:  FU dyspnea    HPI:  Daughter at bedside. Cough better- not as productive. Slept well. Tussinex worked. Using IS and OPEP in the day.    Review of Systems  Gen- No fevers, chills  CV- No chest pain, palpitations  Resp- +Cough, no dyspnea  GI- No N/V/D, abd pain    Otherwise ROS is negative except as mentioned in the HPI.    Objective   Objective     Vital Signs:   Temp:  [97.7 °F (36.5 °C)-99.6 °F (37.6 °C)] 98.7 °F (37.1 °C)  Heart Rate:  [70-88] 76  Resp:  [18-20] 18  BP: (150-164)/(77-98) 156/85        Physical Exam:  Constitutional: No acute distress, awake, alert on RA   Eyes: PERRLA, sclerae anicteric, no conjunctival injection  HENT: NCAT, mucous membranes moist  Neck: Supple, no thyromegaly, no lymphadenopathy, trachea midline  Respiratory: Coarse BS diffusely, nonlabored respirations, Chest tube on right   Cardiovascular: RRR, no murmurs, rubs, or gallops, palpable pedal pulses bilaterally  Gastrointestinal: Positive bowel sounds, soft, nontender, nondistended  Musculoskeletal: No bilateral ankle edema, no clubbing or cyanosis to extremities, small tender bruise on L side anteriorly  Psychiatric: Appropriate affect, cooperative  Neurologic: Oriented x 3, strength symmetric in all extremities, Cranial Nerves grossly intact to confrontation, speech clear  Skin: No rashes    Results Reviewed:  I have personally reviewed current lab, radiology, and data and agree.      Results from last 7 days  Lab Units 03/10/18  0404 18  1201 18  0450 18  0354   WBC 10*3/mm3 15.26*  --  17.64* 12.40*   HEMOGLOBIN g/dL 12.7*  --  12.1* 14.0   HEMATOCRIT % 38.2*  --  36.8* 42.3   PLATELETS 10*3/mm3 223  --  187 194   INR   --  0.98  --   --         Results from last 7 days  Lab Units 03/10/18  0404 03/08/18  0449 03/07/18  0354   SODIUM mmol/L 135 137 141   POTASSIUM mmol/L 4.6 4.5 4.2   CHLORIDE mmol/L 105 109 112*   CO2 mmol/L 22.0 20.0 25.0   BUN mg/dL 24* 23 32*   CREATININE mg/dL 1.00 1.00 1.20   GLUCOSE mg/dL 122* 121* 120*   CALCIUM mg/dL 9.1 8.7 8.5*   ALT (SGPT) U/L  --   --  24   AST (SGOT) U/L  --   --  27     No results found for: BNP  No results found for: PHART    Microbiology Results Abnormal     Procedure Component Value - Date/Time    AFB Culture - Body Fluid, Pleural Cavity [[id number]] Collected:  03/09/18 1000    Lab Status:  Preliminary result Specimen:  Body Fluid from Pleural Cavity Updated:  03/10/18 1358     AFB Stain No acid fast bacilli seen on concentrated smear    Body Fluid Culture - Body Fluid, Pleural Cavity [[id number]]  (Normal) Collected:  03/09/18 1000    Lab Status:  Preliminary result Specimen:  Body Fluid from Pleural Cavity Updated:  03/10/18 0832     BF Culture No growth     Gram Stain Result Many (4+) Red blood cells      Rare (1+) WBCs seen      No organisms seen    Body Fluid Culture - Body Fluid, Pleural Cavity [[id number]]  (Normal) Collected:  03/08/18 1408    Lab Status:  Preliminary result Specimen:  Body Fluid from Pleural Cavity Updated:  03/10/18 0823     BF Culture No growth at 2 days     Gram Stain Result Occasional WBCs seen      No organisms seen    Blood Culture - Blood, [[id number]]  (Normal) Collected:  03/07/18 0445    Lab Status:  Preliminary result Specimen:  Blood from Arm, Right Updated:  03/10/18 0516     Blood Culture No growth at 3 days    Blood Culture - Blood, [[id number]]  (Normal) Collected:  03/07/18 0503    Lab Status:  Preliminary result Specimen:  Blood from Arm, Left Updated:  03/10/18 0516     Blood Culture No growth at 3 days    Respiratory Culture - Sputum, Cough [[id number]] Collected:  03/09/18 2000    Lab Status:  Preliminary result Specimen:  Sputum from Cough Updated:  03/09/18  2147     Gram Stain Result Few (2+) WBCs seen      Moderate (3+) Gram positive cocci in chains      Few (2+) Gram negative bacilli    Anaerobic Culture - Body Fluid, Pleural Cavity [[id number]]  (Normal) Collected:  03/09/18 1000    Lab Status:  Preliminary result Specimen:  Body Fluid from Pleural Cavity Updated:  03/09/18 1305     Culture No anaerobes isolated    Anaerobic Culture - Pleural Fluid, Pleural Cavity [[id number]]  (Normal) Collected:  03/08/18 1409    Lab Status:  Preliminary result Specimen:  Pleural Fluid from Pleural Cavity Updated:  03/09/18 1304     Culture No anaerobes isolated    AFB Culture - Body Fluid, Pleural Cavity [[id number]] Collected:  03/08/18 1408    Lab Status:  Preliminary result Specimen:  Body Fluid from Pleural Cavity Updated:  03/09/18 1125     AFB Stain No acid fast bacilli seen on concentrated smear    MRSA Screen, PCR - Swab, Nares [[id number]]  (Abnormal) Collected:  03/08/18 1605    Lab Status:  Final result Specimen:  Swab from Nares Updated:  03/08/18 1724     MRSA, PCR Positive (C)    Influenza A & B, RT PCR - Swab, Nasopharynx [[id number]]  (Normal) Collected:  03/08/18 1605    Lab Status:  Final result Specimen:  Swab from Nasopharynx Updated:  03/08/18 1648     Influenza A PCR Not Detected     Influenza B PCR Not Detected    KOH Prep - Body Fluid, Chest [[id number]]  (Normal) Collected:  03/08/18 1409    Lab Status:  Final result Specimen:  Body Fluid from Chest Updated:  03/08/18 1638     KOH Prep No yeast or hyphal elements seen          Imaging Results (last 24 hours)     Procedure Component Value Units Date/Time    XR Chest 1 View [[id number]] Updated:  03/10/18 0633             I have reviewed the medications.    Assessment/Plan   Assessment / Plan     Hospital Problem List     * (Principal)Right lower lobe pneumonia    Coronary artery disease    Hypertension    Dyslipidemia    Hypothyroidism    Community acquired pneumonia of right lower lobe of lung           Brief  Hospital Course to date:  Gee Whitney is a 67 y.o. male PMH of limited tobacco abuse, remote MI, HTN, HLD, URI sx x 10 days admitted 3/7/2018 w/ syncope, found to have moderate sized right pleural effusion and possible pleural thickening    Assessment & Plan:    - R sided hemothorax from a fall: s/p chest tube placement 3/9. No air leak today. Minimal output  - CAP with reactive airway disease: Ceftriaxone and doxycycline D5, prednisone, nebs    DVT Prophylaxis:  [facility]    CODE STATUS: Full Code    Disposition: I expect the patient to be discharged after chest tube is removed    Daria Dupont MD  03/10/18  2:23 PM

## 2018-03-10 NOTE — PLAN OF CARE
Problem: Patient Care Overview (Adult)  Goal: Plan of Care Review  Outcome: Ongoing (interventions implemented as appropriate)   03/10/18 0754   Coping/Psychosocial Response Interventions   Plan Of Care Reviewed With patient   Patient Care Overview   Progress progress toward functional goals as expected   Outcome Evaluation   Outcome Summary/Follow up Plan Pt. slept most of the night, pain controlled, chest tube in place w/ 20mL out (bright red), and VSS.

## 2018-03-10 NOTE — PLAN OF CARE
Problem: Patient Care Overview (Adult)  Goal: Plan of Care Review  Outcome: Resolved for upgrade, new template will be applied Date Met: 03/10/18    Goal: Adult Individualization and Mutuality  Outcome: Resolved for upgrade, new template will be applied Date Met: 03/10/18    Goal: Discharge Needs Assessment  Outcome: Resolved for upgrade, new template will be applied Date Met: 03/10/18      Problem: Fall Risk (Adult)  Goal: Identify Related Risk Factors and Signs and Symptoms  Outcome: Resolved for upgrade, new template will be applied Date Met: 03/10/18    Goal: Absence of Falls  Outcome: Resolved for upgrade, new template will be applied Date Met: 03/10/18      Problem: Pneumonia (Adult)  Goal: Signs and Symptoms of Listed Potential Problems Will be Absent or Manageable (Pneumonia)  Outcome: Resolved for upgrade, new template will be applied Date Met: 03/10/18      Problem: Infection, Risk/Actual (Adult)  Goal: Identify Related Risk Factors and Signs and Symptoms  Outcome: Resolved for upgrade, new template will be applied Date Met: 03/10/18    Goal: Infection Prevention/Resolution  Outcome: Resolved for upgrade, new template will be applied Date Met: 03/10/18

## 2018-03-10 NOTE — OP NOTE
DATE OF PROCEDURE: 3/9/2018     PREOPERATIVE DIAGNOSES:  1. Right hemothorax  2. Recent fall  3. Upper respiratory infection  4. Persistent cough  5. Hypertension  6. Hyperlipidemia  7. Remote tobacco abuse     POSTOPERATIVE DIAGNOSES:    1. Right hemothorax  2. Recent fall  3. Upper respiratory infection  4. Persistent cough  5. Hypertension  6. Hyperlipidemia  7. Remote tobacco abuse     PROCEDURES PERFORMED:    1. Right tube thoracostomy insertion ( 36 Fr)    SURGEON: Chris Alexandra MD        ANESTHESIA: 1% lidocaine local anesthetic     INDICATIONS:  67 year old  male with a history of hypertension, hyperlipidemia and remote tobacco abuse who presented with a cough, wheezing and syncopal event.  He was found to have a moderate right hemothorax on CT and IR thoracentesis was unsuccessful.  The patient was felt to be a reasonable candidate for tube thoracostomy insertion. The risks and benefits of surgery were discussed with the patient including pain, bleeding, infection, retained hemothorax requiring surgery, recurrent hemothorax and death. The patient understood these risks and wished to proceed with surgery.      DESCRIPTION OF PROCEDURE:  At the bedside, the patient was prepped and draped in the usual sterile fashion.  A timeout was performed including the patient's name and procedure.  1% lidocaine was infiltrated and an incision made at the 7th intercostal space at the posterior axillary line.  A priscila clamp was utilized to gain entry into the chest and upon spreading, dark thin sanguinous fluid was obtained.  A 36 Lao chest tube was placed and secured using a 0 Silk suture.  The tube was connected to pleurevac suction and 500 cc of dark sanguinous fluid was obtained.  The chest tube was dressed with gauze and overlying tape.  The patient tolerated the procedure well and was left under the care of the nursing staff on the floor.  Postprocedural chest x-ray revealed satisfactory tube position  with resolution of the right lung field opacification from the hemothorax.

## 2018-03-11 ENCOUNTER — APPOINTMENT (OUTPATIENT)
Dept: GENERAL RADIOLOGY | Facility: HOSPITAL | Age: 68
End: 2018-03-11

## 2018-03-11 LAB
ALBUMIN SERPL-MCNC: 3.7 G/DL (ref 3.2–4.8)
ANION GAP SERPL CALCULATED.3IONS-SCNC: 6 MMOL/L (ref 3–11)
BUN BLD-MCNC: 23 MG/DL (ref 9–23)
BUN/CREAT SERPL: 20.9 (ref 7–25)
CALCIUM SPEC-SCNC: 9.1 MG/DL (ref 8.7–10.4)
CHLORIDE SERPL-SCNC: 109 MMOL/L (ref 99–109)
CO2 SERPL-SCNC: 24 MMOL/L (ref 20–31)
CREAT BLD-MCNC: 1.1 MG/DL (ref 0.6–1.3)
DEPRECATED RDW RBC AUTO: 44.6 FL (ref 37–54)
ERYTHROCYTE [DISTWIDTH] IN BLOOD BY AUTOMATED COUNT: 13.5 % (ref 11.3–14.5)
GFR SERPL CREATININE-BSD FRML MDRD: 66 ML/MIN/1.73
GLUCOSE BLD-MCNC: 96 MG/DL (ref 70–100)
HCT VFR BLD AUTO: 37.2 % (ref 38.9–50.9)
HGB BLD-MCNC: 12.1 G/DL (ref 13.1–17.5)
MCH RBC QN AUTO: 29.8 PG (ref 27–31)
MCHC RBC AUTO-ENTMCNC: 32.5 G/DL (ref 32–36)
MCV RBC AUTO: 91.6 FL (ref 80–99)
PHOSPHATE SERPL-MCNC: 4.4 MG/DL (ref 2.4–5.1)
PLATELET # BLD AUTO: 224 10*3/MM3 (ref 150–450)
PMV BLD AUTO: 9.8 FL (ref 6–12)
POTASSIUM BLD-SCNC: 4.5 MMOL/L (ref 3.5–5.5)
RBC # BLD AUTO: 4.06 10*6/MM3 (ref 4.2–5.76)
SODIUM BLD-SCNC: 139 MMOL/L (ref 132–146)
WBC NRBC COR # BLD: 15.39 10*3/MM3 (ref 3.5–10.8)

## 2018-03-11 PROCEDURE — 25010000002 CEFTRIAXONE PER 250 MG: Performed by: NURSE PRACTITIONER

## 2018-03-11 PROCEDURE — 99231 SBSQ HOSP IP/OBS SF/LOW 25: CPT | Performed by: PHYSICIAN ASSISTANT

## 2018-03-11 PROCEDURE — 71045 X-RAY EXAM CHEST 1 VIEW: CPT

## 2018-03-11 PROCEDURE — 25010000002 HEPARIN (PORCINE) PER 1000 UNITS: Performed by: NURSE PRACTITIONER

## 2018-03-11 PROCEDURE — 94640 AIRWAY INHALATION TREATMENT: CPT

## 2018-03-11 PROCEDURE — 85027 COMPLETE CBC AUTOMATED: CPT | Performed by: HOSPITALIST

## 2018-03-11 PROCEDURE — 99232 SBSQ HOSP IP/OBS MODERATE 35: CPT | Performed by: HOSPITALIST

## 2018-03-11 PROCEDURE — 94799 UNLISTED PULMONARY SVC/PX: CPT

## 2018-03-11 PROCEDURE — 80069 RENAL FUNCTION PANEL: CPT | Performed by: HOSPITALIST

## 2018-03-11 PROCEDURE — 63710000001 PREDNISONE PER 1 MG: Performed by: INTERNAL MEDICINE

## 2018-03-11 RX ADMIN — HEPARIN SODIUM 5000 UNITS: 5000 INJECTION, SOLUTION INTRAVENOUS; SUBCUTANEOUS at 20:40

## 2018-03-11 RX ADMIN — Medication 2 TABLET: at 09:03

## 2018-03-11 RX ADMIN — HEPARIN SODIUM 5000 UNITS: 5000 INJECTION, SOLUTION INTRAVENOUS; SUBCUTANEOUS at 12:52

## 2018-03-11 RX ADMIN — IPRATROPIUM BROMIDE AND ALBUTEROL SULFATE 3 ML: 2.5; .5 SOLUTION RESPIRATORY (INHALATION) at 06:50

## 2018-03-11 RX ADMIN — ASPIRIN 325 MG: 325 TABLET, DELAYED RELEASE ORAL at 09:04

## 2018-03-11 RX ADMIN — VITAMIN D, TAB 1000IU (100/BT) 1000 UNITS: 25 TAB at 09:04

## 2018-03-11 RX ADMIN — LIDOCAINE 1 PATCH: 50 PATCH CUTANEOUS at 09:04

## 2018-03-11 RX ADMIN — Medication 5 MG: at 20:41

## 2018-03-11 RX ADMIN — BUDESONIDE 0.5 MG: 0.5 INHALANT RESPIRATORY (INHALATION) at 18:46

## 2018-03-11 RX ADMIN — HYDROCODONE POLISTIREX AND CHLORPHENIRAMINE POLISTIREX 5 ML: 10; 8 SUSPENSION, EXTENDED RELEASE ORAL at 20:40

## 2018-03-11 RX ADMIN — CEFTRIAXONE SODIUM 1 G: 1 INJECTION, SOLUTION INTRAVENOUS at 06:19

## 2018-03-11 RX ADMIN — ARFORMOTEROL TARTRATE 15 MCG: 15 SOLUTION RESPIRATORY (INHALATION) at 18:46

## 2018-03-11 RX ADMIN — ARFORMOTEROL TARTRATE 15 MCG: 15 SOLUTION RESPIRATORY (INHALATION) at 06:50

## 2018-03-11 RX ADMIN — HEPARIN SODIUM 5000 UNITS: 5000 INJECTION, SOLUTION INTRAVENOUS; SUBCUTANEOUS at 06:19

## 2018-03-11 RX ADMIN — Medication 2 TABLET: at 20:41

## 2018-03-11 RX ADMIN — HYDROCODONE POLISTIREX AND CHLORPHENIRAMINE POLISTIREX 5 ML: 10; 8 SUSPENSION, EXTENDED RELEASE ORAL at 09:10

## 2018-03-11 RX ADMIN — BUDESONIDE 0.5 MG: 0.5 INHALANT RESPIRATORY (INHALATION) at 06:50

## 2018-03-11 RX ADMIN — IPRATROPIUM BROMIDE AND ALBUTEROL SULFATE 3 ML: 2.5; .5 SOLUTION RESPIRATORY (INHALATION) at 12:41

## 2018-03-11 RX ADMIN — SODIUM CHLORIDE 75 ML/HR: 9 INJECTION, SOLUTION INTRAVENOUS at 04:29

## 2018-03-11 RX ADMIN — PREDNISONE 40 MG: 20 TABLET ORAL at 09:03

## 2018-03-11 RX ADMIN — LEVOTHYROXINE SODIUM 75 MCG: 75 TABLET ORAL at 06:19

## 2018-03-11 RX ADMIN — IPRATROPIUM BROMIDE AND ALBUTEROL SULFATE 3 ML: 2.5; .5 SOLUTION RESPIRATORY (INHALATION) at 18:46

## 2018-03-11 RX ADMIN — MULTIPLE VITAMINS W/ MINERALS TAB 1 TABLET: TAB ORAL at 09:03

## 2018-03-11 RX ADMIN — HYDROCODONE BITARTRATE AND ACETAMINOPHEN 1 TABLET: 7.5; 325 TABLET ORAL at 20:41

## 2018-03-11 RX ADMIN — ATORVASTATIN CALCIUM 20 MG: 20 TABLET, FILM COATED ORAL at 20:41

## 2018-03-11 NOTE — PROGRESS NOTES
"Gee Whitney  [id number]  1950     LOS: 4 days   Patient Care Team:  Vicki Perry MD as PCP - General  Vicki Perry MD as PCP - Family Medicine    Chief Complaint right chest area pain: Right chest tube    POD 2 and chest tube placement  Subjective: Denies shortness of breath  No new events overnight  Objective:     Vital Sign Min/Max for last 24 hours  Temp  Min: 97.4 °F (36.3 °C)  Max: 98.7 °F (37.1 °C)   BP  Min: 162/81  Max: 162/81   Pulse  Min: 70  Max: 107   Resp  Min: 16  Max: 20   SpO2  Min: 91 %  Max: 95 %   No Data Recorded   No Data Recorded     Flowsheet Rows    Flowsheet Row First Filed Value   Admission Height 175.3 cm (69\") Documented at 03/07/2018 0303   Admission Weight 77.1 kg (170 lb) Documented at 03/07/2018 0303          Physical Exam:Max 99.6, alert and oriented ×3  CV regular rate and rhythm  Lungs clear bilaterally  Wound:    Pulses:     Mediastinal and Chest Tube Drainage: No air leak  50 mL of drainage for 24 hours       Results Review:     Results from last 7 days  Lab Units 03/11/18  0659   WBC 10*3/mm3 15.39*   HEMOGLOBIN g/dL 12.1*   HEMATOCRIT % 37.2*   PLATELETS 10*3/mm3 224       Results from last 7 days  Lab Units 03/11/18  0659   SODIUM mmol/L 139   POTASSIUM mmol/L 4.5   CHLORIDE mmol/L 109   CO2 mmol/L 24.0   BUN mg/dL 23   CREATININE mg/dL 1.10   GLUCOSE mg/dL 96   CALCIUM mg/dL 9.1             Assessment    Principal Problem:    Right lower lobe pneumonia  Active Problems:    Coronary artery disease    Hypertension    Dyslipidemia    Hypothyroidism    Community acquired pneumonia of right lower lobe of lung      Plan  Continue chest tube          Marko Campuzano PA-C  03/11/18  8:15 AM      Please note that portions of this note were completed with a voice recognition program. Efforts were made to edit the dictations, but words may be mistranscribed  "

## 2018-03-11 NOTE — PLAN OF CARE
Problem: Patient Care Overview (Adult)  Goal: Plan of Care Review  Outcome: Ongoing (interventions implemented as appropriate)   03/11/18 0534   Coping/Psychosocial Response Interventions   Plan Of Care Reviewed With patient   Patient Care Overview   Progress progress toward functional goals as expected   Outcome Evaluation   Outcome Summary/Follow up Plan Pt slept most of the night. Chest tube in place output for the shift 10mL (bright red). Staples in place. No c/o nausea. Pt. requested something to help him sleep see orders. Ambulated up and down the hallway today. VSS stable. Pt stated he is feeling better.

## 2018-03-12 ENCOUNTER — APPOINTMENT (OUTPATIENT)
Dept: GENERAL RADIOLOGY | Facility: HOSPITAL | Age: 68
End: 2018-03-12

## 2018-03-12 PROBLEM — J94.2 HEMOTHORAX ON RIGHT: Status: ACTIVE | Noted: 2018-03-12

## 2018-03-12 PROBLEM — J20.8 ACUTE VIRAL BRONCHITIS: Status: ACTIVE | Noted: 2018-03-12

## 2018-03-12 LAB
ALBUMIN SERPL-MCNC: 4 G/DL (ref 3.2–4.8)
ANION GAP SERPL CALCULATED.3IONS-SCNC: 7 MMOL/L (ref 3–11)
BACTERIA FLD CULT: NORMAL
BACTERIA SPEC AEROBE CULT: NORMAL
BACTERIA SPEC AEROBE CULT: NORMAL
BACTERIA SPEC RESP CULT: ABNORMAL
BUN BLD-MCNC: 23 MG/DL (ref 9–23)
BUN/CREAT SERPL: 19.2 (ref 7–25)
CALCIUM SPEC-SCNC: 9.5 MG/DL (ref 8.7–10.4)
CHLORIDE SERPL-SCNC: 103 MMOL/L (ref 99–109)
CO2 SERPL-SCNC: 28 MMOL/L (ref 20–31)
CREAT BLD-MCNC: 1.2 MG/DL (ref 0.6–1.3)
DEPRECATED RDW RBC AUTO: 44.8 FL (ref 37–54)
ERYTHROCYTE [DISTWIDTH] IN BLOOD BY AUTOMATED COUNT: 13.6 % (ref 11.3–14.5)
GFR SERPL CREATININE-BSD FRML MDRD: 60 ML/MIN/1.73
GIE STN SPEC: NORMAL
GLUCOSE BLD-MCNC: 138 MG/DL (ref 70–100)
GRAM STN SPEC: ABNORMAL
GRAM STN SPEC: NORMAL
GRAM STN SPEC: NORMAL
HCT VFR BLD AUTO: 39.5 % (ref 38.9–50.9)
HGB BLD-MCNC: 12.9 G/DL (ref 13.1–17.5)
LAB AP CASE REPORT: NORMAL
LAB AP CASE REPORT: NORMAL
Lab: NORMAL
Lab: NORMAL
MCH RBC QN AUTO: 29.9 PG (ref 27–31)
MCHC RBC AUTO-ENTMCNC: 32.7 G/DL (ref 32–36)
MCV RBC AUTO: 91.6 FL (ref 80–99)
PATH REPORT.FINAL DX SPEC: NORMAL
PATH REPORT.FINAL DX SPEC: NORMAL
PHOSPHATE SERPL-MCNC: 3.2 MG/DL (ref 2.4–5.1)
PLATELET # BLD AUTO: 247 10*3/MM3 (ref 150–450)
PMV BLD AUTO: 9.6 FL (ref 6–12)
POTASSIUM BLD-SCNC: 4.2 MMOL/L (ref 3.5–5.5)
RBC # BLD AUTO: 4.31 10*6/MM3 (ref 4.2–5.76)
SODIUM BLD-SCNC: 138 MMOL/L (ref 132–146)
WBC NRBC COR # BLD: 14.55 10*3/MM3 (ref 3.5–10.8)

## 2018-03-12 PROCEDURE — 85027 COMPLETE CBC AUTOMATED: CPT | Performed by: HOSPITALIST

## 2018-03-12 PROCEDURE — 94640 AIRWAY INHALATION TREATMENT: CPT

## 2018-03-12 PROCEDURE — 99232 SBSQ HOSP IP/OBS MODERATE 35: CPT | Performed by: NURSE PRACTITIONER

## 2018-03-12 PROCEDURE — 63710000001 PREDNISONE PER 1 MG: Performed by: INTERNAL MEDICINE

## 2018-03-12 PROCEDURE — 99231 SBSQ HOSP IP/OBS SF/LOW 25: CPT | Performed by: THORACIC SURGERY (CARDIOTHORACIC VASCULAR SURGERY)

## 2018-03-12 PROCEDURE — 99232 SBSQ HOSP IP/OBS MODERATE 35: CPT | Performed by: INTERNAL MEDICINE

## 2018-03-12 PROCEDURE — 94799 UNLISTED PULMONARY SVC/PX: CPT

## 2018-03-12 PROCEDURE — 71045 X-RAY EXAM CHEST 1 VIEW: CPT

## 2018-03-12 PROCEDURE — 94760 N-INVAS EAR/PLS OXIMETRY 1: CPT

## 2018-03-12 PROCEDURE — 25010000002 HEPARIN (PORCINE) PER 1000 UNITS: Performed by: NURSE PRACTITIONER

## 2018-03-12 PROCEDURE — 80069 RENAL FUNCTION PANEL: CPT | Performed by: HOSPITALIST

## 2018-03-12 RX ORDER — LEVOFLOXACIN 5 MG/ML
750 INJECTION, SOLUTION INTRAVENOUS EVERY 24 HOURS
Status: DISCONTINUED | OUTPATIENT
Start: 2018-03-12 | End: 2018-03-12 | Stop reason: ALTCHOICE

## 2018-03-12 RX ORDER — LEVOFLOXACIN 750 MG/1
750 TABLET ORAL
Status: DISCONTINUED | OUTPATIENT
Start: 2018-03-12 | End: 2018-03-14 | Stop reason: HOSPADM

## 2018-03-12 RX ADMIN — LIDOCAINE 1 PATCH: 50 PATCH CUTANEOUS at 08:52

## 2018-03-12 RX ADMIN — Medication 2 TABLET: at 08:52

## 2018-03-12 RX ADMIN — HEPARIN SODIUM 5000 UNITS: 5000 INJECTION, SOLUTION INTRAVENOUS; SUBCUTANEOUS at 13:11

## 2018-03-12 RX ADMIN — IPRATROPIUM BROMIDE AND ALBUTEROL SULFATE 3 ML: 2.5; .5 SOLUTION RESPIRATORY (INHALATION) at 07:37

## 2018-03-12 RX ADMIN — BUDESONIDE 0.5 MG: 0.5 INHALANT RESPIRATORY (INHALATION) at 18:24

## 2018-03-12 RX ADMIN — IPRATROPIUM BROMIDE AND ALBUTEROL SULFATE 3 ML: 2.5; .5 SOLUTION RESPIRATORY (INHALATION) at 13:57

## 2018-03-12 RX ADMIN — ARFORMOTEROL TARTRATE 15 MCG: 15 SOLUTION RESPIRATORY (INHALATION) at 18:24

## 2018-03-12 RX ADMIN — BUDESONIDE 0.5 MG: 0.5 INHALANT RESPIRATORY (INHALATION) at 07:41

## 2018-03-12 RX ADMIN — ASPIRIN 325 MG: 325 TABLET, DELAYED RELEASE ORAL at 08:52

## 2018-03-12 RX ADMIN — VITAMIN D, TAB 1000IU (100/BT) 1000 UNITS: 25 TAB at 08:52

## 2018-03-12 RX ADMIN — IPRATROPIUM BROMIDE AND ALBUTEROL SULFATE 3 ML: 2.5; .5 SOLUTION RESPIRATORY (INHALATION) at 18:24

## 2018-03-12 RX ADMIN — HEPARIN SODIUM 5000 UNITS: 5000 INJECTION, SOLUTION INTRAVENOUS; SUBCUTANEOUS at 21:42

## 2018-03-12 RX ADMIN — LEVOFLOXACIN 750 MG: 750 TABLET, FILM COATED ORAL at 13:35

## 2018-03-12 RX ADMIN — LEVOTHYROXINE SODIUM 75 MCG: 75 TABLET ORAL at 05:45

## 2018-03-12 RX ADMIN — HEPARIN SODIUM 5000 UNITS: 5000 INJECTION, SOLUTION INTRAVENOUS; SUBCUTANEOUS at 05:45

## 2018-03-12 RX ADMIN — Medication 2 TABLET: at 21:41

## 2018-03-12 RX ADMIN — Medication 5 MG: at 21:42

## 2018-03-12 RX ADMIN — HYDROCODONE POLISTIREX AND CHLORPHENIRAMINE POLISTIREX 5 ML: 10; 8 SUSPENSION, EXTENDED RELEASE ORAL at 08:52

## 2018-03-12 RX ADMIN — ATORVASTATIN CALCIUM 20 MG: 20 TABLET, FILM COATED ORAL at 21:41

## 2018-03-12 RX ADMIN — PREDNISONE 40 MG: 20 TABLET ORAL at 08:52

## 2018-03-12 RX ADMIN — MULTIPLE VITAMINS W/ MINERALS TAB 1 TABLET: TAB ORAL at 08:52

## 2018-03-12 RX ADMIN — ARFORMOTEROL TARTRATE 15 MCG: 15 SOLUTION RESPIRATORY (INHALATION) at 08:52

## 2018-03-12 RX ADMIN — HYDROCODONE POLISTIREX AND CHLORPHENIRAMINE POLISTIREX 5 ML: 10; 8 SUSPENSION, EXTENDED RELEASE ORAL at 21:42

## 2018-03-12 NOTE — PROGRESS NOTES
Continued Stay Note  [facility]     Patient Name: Gee Whitney  MRN: [id number]  Today's Date: 3/12/2018    Admit Date: 3/7/2018          Discharge Plan     Row Name 03/12/18 1212       Plan    Plan discharge plan    Patient/Family in Agreement with Plan yes    Plan Comments Spoke with pt and pt spouse in room in regards to discharge planning. Plan is home with spouse. Pt denies discharge needs at this time. CM will cont to follow.              Discharge Codes    No documentation.       Expected Discharge Date and Time     Expected Discharge Date Expected Discharge Time    Mar 13, 2018             Nakita Pichardo RN

## 2018-03-12 NOTE — PLAN OF CARE
Problem: Fall Risk (Adult)  Goal: Identify Related Risk Factors and Signs and Symptoms  Outcome: Outcome(s) achieved Date Met: 03/12/18    Goal: Absence of Falls  Outcome: Outcome(s) achieved Date Met: 03/12/18      Problem: Pneumonia (Adult)  Goal: Signs and Symptoms of Listed Potential Problems Will be Absent or Manageable (Pneumonia)  Outcome: Ongoing (interventions implemented as appropriate)   03/12/18 1700   Pneumonia   Problems Assessed (Pneumonia) respiratory compromise   Problems Present (Pneumonia) respiratory compromise       Problem: Infection, Risk/Actual (Adult)  Goal: Identify Related Risk Factors and Signs and Symptoms  Outcome: Outcome(s) achieved Date Met: 03/12/18    Goal: Infection Prevention/Resolution  Outcome: Ongoing (interventions implemented as appropriate)   03/12/18 1700   Infection, Risk/Actual (Adult)   Infection Prevention/Resolution making progress toward outcome

## 2018-03-12 NOTE — PLAN OF CARE
Problem: Patient Care Overview (Adult)  Goal: Plan of Care Review  Outcome: Ongoing (interventions implemented as appropriate)   03/12/18 0500   Coping/Psychosocial Response Interventions   Plan Of Care Reviewed With patient   Patient Care Overview   Progress progress toward functional goals as expected   Outcome Evaluation   Outcome Summary/Follow up Plan Pt. slept most of the night, chest tube in place output see chart, ambulated several times today, abdomen distented (BM two days ago), & SR/ST on tele.

## 2018-03-12 NOTE — PROGRESS NOTES
Cardiothoracic Surgery Progress Note      PPD # 3 s/p Right tube thoracostomy    Chief complaint: Chest tube pain     LOS: 5 days      Subjective:  Mild pain at chest tube site      Objective:  Vital Signs  Temp:  [97.2 °F (36.2 °C)-97.5 °F (36.4 °C)] 97.2 °F (36.2 °C)  Heart Rate:  [] 101  Resp:  [15-18] 18  BP: (147-162)/(76-90) 162/90    Physical Exam:   General Appearance: alert, appears stated age and cooperative   Lungs: clear to auscultation, respirations regular, respirations even and respirations unlabored   Heart: regular rhythm & normal rate, normal S1, S2 and no murmur, no era, no rub   Chest tube with serosanguinous drainage     Results:    Results from last 7 days  Lab Units 03/11/18  0659   WBC 10*3/mm3 15.39*   HEMOGLOBIN g/dL 12.1*   HEMATOCRIT % 37.2*   PLATELETS 10*3/mm3 224       Results from last 7 days  Lab Units 03/11/18  0659   SODIUM mmol/L 139   POTASSIUM mmol/L 4.5   CHLORIDE mmol/L 109   CO2 mmol/L 24.0   BUN mg/dL 23   CREATININE mg/dL 1.10   GLUCOSE mg/dL 96   CALCIUM mg/dL 9.1         Assessment:  Right hemothorax    Plan:  D/C chest tube this afternoon if output remains minimal  Cytology pending  Pulmonary toilet    Chris Alexandra MD  03/12/18  7:46 AM

## 2018-03-12 NOTE — PROGRESS NOTES
[facility] Medicine Services  PROGRESS NOTE    Patient Name: Gee Whitney  : 1950  MRN: [id number]    Date of Admission: 3/7/2018  Length of Stay: 5  Primary Care Physician: Vicki ePrry MD    Subjective   Subjective     CC:  FU dyspnea    HPI:  Pt was seen at 11:50 AM resting upright in chair in no acute distress.  No visitors at bedside currently. States he feels much better.  Tolerating diet.  Ambulating with asst.  Pain well controlled.  No nausea, vomiting.  She states that he is still having some red output in his chest tube and that he will feel comfortable going home until its yellow or no output.  Otherwise no issues.  Breathing easy.    Review of Systems  Gen- No fevers, chills  CV- No chest pain, palpitations  Resp- +Cough, no dyspnea  GI- No N/V/D, abd pain    Otherwise ROS is negative except as mentioned in the HPI.    Objective   Objective     Vital Signs:   Temp:  [97.2 °F (36.2 °C)] 97.2 °F (36.2 °C)  Heart Rate:  [] 91  Resp:  [15-18] 16  BP: (162-172)/(88-90) 170/88        Physical Exam:  Constitutional: No acute distress, awake, alert sitting upright in chair.  No visitors at bedside.  Eyes: PERRLA, sclerae anicteric, no conjunctival injection  HENT: NCAT, mucous membranes moist  Neck: Supple, trachea midline  Respiratory: Coarse BS diffusely, nonlabored respirations, Chest tube on right back.  Very minimal crepitus to upper posterior back.  Serosanguineous drainage in chest tube tubing.  Cardiovascular: RRR, no murmurs, rubs, or gallops, palpable pedal pulses bilaterally  Gastrointestinal: Positive bowel sounds, soft, nontender, nondistended. Obese.  Musculoskeletal: No bilateral ankle edema, no clubbing or cyanosis to extremities, small tender bruise on L side anteriorly upper stomach region with lidocaine patch in place.  Psychiatric: Appropriate affect, cooperative  Neurologic: Oriented x 3, strength symmetric in all extremities, Cranial  Nerves grossly intact to confrontation, speech clear, follows commands.  Skin: No rashes    Results Reviewed:  I have personally reviewed current lab, radiology, and data and agree.      Results from last 7 days  Lab Units 03/12/18 0828 03/11/18 0659 03/10/18  0404 03/08/18  1201   WBC 10*3/mm3 14.55* 15.39* 15.26*  --    HEMOGLOBIN g/dL 12.9* 12.1* 12.7*  --    HEMATOCRIT % 39.5 37.2* 38.2*  --    PLATELETS 10*3/mm3 247 224 223  --    INR   --   --   --  0.98       Results from last 7 days  Lab Units 03/12/18  0828 03/11/18 0659 03/10/18  0404 03/07/18  0354   SODIUM mmol/L 138 139 135  < > 141   POTASSIUM mmol/L 4.2 4.5 4.6  < > 4.2   CHLORIDE mmol/L 103 109 105  < > 112*   CO2 mmol/L 28.0 24.0 22.0  < > 25.0   BUN mg/dL 23 23 24*  < > 32*   CREATININE mg/dL 1.20 1.10 1.00  < > 1.20   GLUCOSE mg/dL 138* 96 122*  < > 120*   CALCIUM mg/dL 9.5 9.1 9.1  < > 8.5*   ALT (SGPT) U/L  --   --   --   --  24   AST (SGOT) U/L  --   --   --   --  27   < > = values in this interval not displayed.  No results found for: BNP  No results found for: PHART    Microbiology Results Abnormal     Procedure Component Value - Date/Time    Respiratory Culture - Sputum, Cough [[id number]]  (Abnormal)  (Susceptibility) Collected:  03/09/18 2000    Lab Status:  Final result Specimen:  Sputum from Cough Updated:  03/12/18 1134     Respiratory Culture --      Moderate growth (3+) Pseudomonas aeruginosa (A)      Moderate growth (3+) Normal Respiratory Nancie     Gram Stain Result Few (2+) WBCs seen      Moderate (3+) Gram positive cocci in chains      Few (2+) Gram negative bacilli    Susceptibility      Pseudomonas aeruginosa     NÉSTOR     Aztreonam <=8 ug/ml Susceptible     Cefepime <=8 ug/ml Susceptible     Ceftazidime 4 ug/ml Susceptible     Gentamicin <=4 ug/ml Susceptible     Levofloxacin <=2 ug/ml Susceptible     Meropenem <=1 ug/ml Susceptible     Piperacillin + Tazobactam <=16 ug/ml Susceptible     Tobramycin <=4 ug/ml Susceptible                     Body Fluid Culture - Body Fluid, Pleural Cavity [[id number]]  (Normal) Collected:  03/09/18 1000    Lab Status:  Preliminary result Specimen:  Body Fluid from Pleural Cavity Updated:  03/12/18 0820     BF Culture No growth at 3 days     Gram Stain Result Many (4+) Red blood cells      Rare (1+) WBCs seen      No organisms seen    Body Fluid Culture - Body Fluid, Pleural Cavity [[id number]]  (Normal) Collected:  03/08/18 1408    Lab Status:  Final result Specimen:  Body Fluid from Pleural Cavity Updated:  03/12/18 0818     BF Culture No growth at 4 days     Gram Stain Result Occasional WBCs seen      No organisms seen    Blood Culture - Blood, [[id number]]  (Normal) Collected:  03/07/18 0503    Lab Status:  Final result Specimen:  Blood from Arm, Left Updated:  03/12/18 0616     Blood Culture No growth at 5 days    Blood Culture - Blood, [[id number]]  (Normal) Collected:  03/07/18 0445    Lab Status:  Final result Specimen:  Blood from Arm, Right Updated:  03/12/18 0616     Blood Culture No growth at 5 days    AFB Culture - Body Fluid, Pleural Cavity [[id number]] Collected:  03/09/18 1000    Lab Status:  Preliminary result Specimen:  Body Fluid from Pleural Cavity Updated:  03/10/18 1358     AFB Stain No acid fast bacilli seen on concentrated smear    Anaerobic Culture - Body Fluid, Pleural Cavity [[id number]]  (Normal) Collected:  03/09/18 1000    Lab Status:  Preliminary result Specimen:  Body Fluid from Pleural Cavity Updated:  03/09/18 1305     Culture No anaerobes isolated    Anaerobic Culture - Pleural Fluid, Pleural Cavity [[id number]]  (Normal) Collected:  03/08/18 1409    Lab Status:  Preliminary result Specimen:  Pleural Fluid from Pleural Cavity Updated:  03/09/18 1304     Culture No anaerobes isolated    AFB Culture - Body Fluid, Pleural Cavity [[id number]] Collected:  03/08/18 1408    Lab Status:  Preliminary result Specimen:  Body Fluid from Pleural Cavity Updated:  03/09/18 1125     AFB Stain No  acid fast bacilli seen on concentrated smear    MRSA Screen, PCR - Swab, Nares [[id number]]  (Abnormal) Collected:  03/08/18 1605    Lab Status:  Final result Specimen:  Swab from Nares Updated:  03/08/18 1724     MRSA, PCR Positive (C)    Influenza A & B, RT PCR - Swab, Nasopharynx [[id number]]  (Normal) Collected:  03/08/18 1605    Lab Status:  Final result Specimen:  Swab from Nasopharynx Updated:  03/08/18 1648     Influenza A PCR Not Detected     Influenza B PCR Not Detected    KOH Prep - Body Fluid, Chest [[id number]]  (Normal) Collected:  03/08/18 1409    Lab Status:  Final result Specimen:  Body Fluid from Chest Updated:  03/08/18 1638     KOH Prep No yeast or hyphal elements seen          Imaging Results (last 24 hours)     Procedure Component Value Units Date/Time    XR Chest 1 View [[id number]] Collected:  03/12/18 0906     Updated:  03/12/18 1035    Narrative:       EXAMINATION: XR CHEST 1 VW- 03/12/2018     INDICATION: postop; J18.1-Lobar pneumonia, unspecified organism;  R55-Syncope and collapse; S01.01XA-Laceration without foreign body of  scalp, initial encounter      COMPARISON: 03/11/2018     FINDINGS: Portable chest reveals no pneumothorax seen on the right with  right chest tube in place. Heart is borderline enlarged. No new focal  parenchymal opacification present. Pulmonary vascularity is within  normal limits.           Impression:       No definite pneumothorax on the right. Right chest tubes in  place. Heart is enlarged.     D:  03/12/2018  E:  03/12/2018     This report was finalized on 3/12/2018 10:33 AM by Dr. Claudia Renteria MD.       XR Chest 1 View [[id number]] Collected:  03/11/18 1540     Updated:  03/11/18 1819    Narrative:          EXAMINATION: XR CHEST 1 VW - 03/11/2018     INDICATION: J18.1-Lobar pneumonia, unspecified organism; R55-Syncope and  collapse; S01.01XA-Laceration without foreign body of scalp, initial  encounter.      COMPARISON: None.     FINDINGS:   1. The right  chest tube is well-positioned. There is a small right  apical cap pneumothorax noted which is likely not significant.  3. The left lung is clear. The heart is normal. There is mild volume  reduction at the bases.           Impression:       Stable findings in the chest when compared yesterday as  described above.     DICTATED:     03/11/2018  EDITED/ls :     03/11/2018      This report was finalized on 3/11/2018 6:17 PM by Dr. Deandre Garcia MD.                I have reviewed the medications.    Assessment/Plan   Assessment / Plan     Hospital Problem List     * (Principal)Right lower lobe pneumonia    Coronary artery disease    Hypertension    Dyslipidemia    Hypothyroidism    Community acquired pneumonia of right lower lobe of lung           Brief Hospital Course to date:  Gee Whitney is a 67 y.o. male PMH of limited tobacco abuse, remote MI, HTN, HLD, URI sx x 10 days admitted 3/7/2018 w/ syncope, found to have moderate sized right pleural effusion and possible pleural thickening    Assessment & Plan:    - R sided hemothorax from a fall: s/p chest tube placement 3/9. CT to L[facility]. CTS on board.  Still to drain.  Output decreasing.  - CAP with reactive airway disease: Completed 5 days of abx, prednisone D3, nebs.  However cultures today show pseudomonas.  We will start oral Levaquin 750 mg daily today.  Continue supportive therapy as needed.    DVT Prophylaxis:  [facility]    CODE STATUS: Full Code    Disposition: I expect the patient to be discharged his home after chest tube is removed and medically stable.    Fiona Castillo, APRN  03/12/18  2:33 PM

## 2018-03-12 NOTE — PROGRESS NOTES
" PULMONARY NOTE     Hospital Day: 5    Mr. Gee Whitney, 67 y.o. male is followed for:   Hemothorax and reactive airways disease         SUBJECTIVE     67 y.o.male with relevant PMH of limited tobacco abuse, remote MI, HTN, HLD, URI sx x 10 days as well as severe coughing paroxysms admitted 3/7/2018 w/ syncope, found to have moderate sized right pleural effusion and possible pleural thickening. Thoracentesis c/w hemothorax.  Now s/p large bore CT.  He was felt to have a severe viral bronchitis with reactive airways disease/wheezing that precipitated his cough-induced chest trauma    Interval history:    Has improved significantly over the weekend.  Chest tube drainage is down.  Cough is significantly improved.  He is growing Pseudomonas from his sputum culture    The patient's relevant past medical, surgical and social history were reviewed and updated in Epic as appropriate.        OBJECTIVE     Vital Sign Min/Max for last 24 hours  Temp  Min: 97.2 °F (36.2 °C)  Max: 97.2 °F (36.2 °C)   BP  Min: 162/90  Max: 174/84   Pulse  Min: 73  Max: 109   Resp  Min: 15  Max: 18   SpO2  Min: 91 %  Max: 96 %   No Data Recorded   Weight  Min: 79.2 kg (174 lb 8 oz)  Max: 79.2 kg (174 lb 8 oz)      Intake/Output Summary (Last 24 hours) at 03/12/18 1837  Last data filed at 03/12/18 1800   Gross per 24 hour   Intake              780 ml   Output             3140 ml   Net            -2360 ml      Flowsheet Rows    Flowsheet Row First Filed Value   Admission Height 175.3 cm (69\") Documented at 03/07/2018 0303   Admission Weight 77.1 kg (170 lb) Documented at 03/07/2018 0303        Body mass index is 25.04 kg/m². 1    03/07/18  0303 03/07/18  0623 03/12/18  0602   Weight: 77.1 kg (170 lb) 77.2 kg (170 lb 3.2 oz) 79.2 kg (174 lb 8 oz)             Objective:  General Appearance:  In no acute distress.    Vital signs: (most recent): Blood pressure 174/84, pulse 91, temperature 97.2 °F (36.2 °C), temperature source Tympanic, resp. rate " "16, height 177.8 cm (70\"), weight 79.2 kg (174 lb 8 oz), SpO2 92 %.    HEENT: Normal HEENT exam.    Lungs:  Normal effort and normal respiratory rate.  He is not in respiratory distress.  There are rales and wheezes.  No rhonchi.  (Very mild wheezes and a few bibasilar crackles)  Heart: Normal rate.  Regular rhythm.  S1 normal and S2 normal.  No murmur, gallop or friction rub.   Chest: Symmetric chest wall expansion. (Right-sided chest tube with diminished serosanguineous drainage and no air leak.    Ecchymoses present)  Abdomen: Abdomen is soft and non-distended.  Bowel sounds are normal.   There is no abdominal tenderness.   There is no mass. There is no splenomegaly. There is no hepatomegaly.   Extremities: There is no deformity or dependent edema.    Neurological: Patient is alert and oriented to person, place and time.    Pupils:  Pupils are equal, round, and reactive to light.    Skin:  Warm and dry.                      I reviewed the patient's results and images, including reviewing images and reports.    Medications reviewed.      Scheduled Meds:    arformoterol 15 mcg Nebulization BID - RT   aspirin  mg Oral Daily   atorvastatin 20 mg Oral Nightly   budesonide 0.5 mg Nebulization BID - RT   cholecalciferol 1,000 Units Oral Daily   heparin (porcine) 5,000 Units Subcutaneous Q8H   HYDROcod Polst-CPM Polst ER 5 mL Oral BID   ipratropium-albuterol 3 mL Nebulization Q6H While Awake - RT   levoFLOXacin 750 mg Oral Daily   levothyroxine 75 mcg Oral Q AM   lidocaine 1 patch Transdermal Q24H   melatonin 5 mg Sublingual Nightly   multivitamin with minerals 1 tablet Oral Daily   predniSONE 40 mg Oral Daily   sennosides-docusate sodium 2 tablet Oral BID         Assessment/Plan   ASSESSMENT      Hospital Problem List     * (Principal)Community acquired pneumonia of right lower lobe of lung    Hemothorax on right    Acute viral bronchitis with RADS    Coronary artery disease    Hypertension    Dyslipidemia    " Hypothyroidism            He has what appears to be an acute viral bronchitis with a significant reactive airways disease precipitating severe cough and resulting chest trauma with right-sided hemothorax requiring chest tube placement.  He is improved with antibiotics, steroids, and chest tube drainage.  He is growing Pseudomonas from his sputum.  This is of unclear pathogenicity as he is not producing purulent sputum anymore and maybe airway colonization due to his recent antibiotics.  Agree with switching to Levaquin to complete a course of therapy, however.           RECOMMENDATIONS     1. Continue bronchodilators   2. Complete 5 day course of Levaquin   3. Complete steroid pulse   4. Chest tube or surgery, probably remove soon   5. Discussed with patient and family in detail.  Will continue bronchodilators initially at home and would expect gradual improvement back to his original baseline.    6. Will follow          I discussed the patient's findings and my recommendations with patient and family         Jax Purcell MD  Pulmonary and Critical Care Medicine

## 2018-03-13 ENCOUNTER — APPOINTMENT (OUTPATIENT)
Dept: GENERAL RADIOLOGY | Facility: HOSPITAL | Age: 68
End: 2018-03-13

## 2018-03-13 LAB
B PARAPERT DNA SPEC QL NAA+PROBE: NEGATIVE
B PERT DNA SPEC QL NAA+PROBE: NEGATIVE
BACTERIA FLD CULT: NORMAL
FLUAV RNA SPEC QL NAA+PROBE: NEGATIVE
FLUBV RNA SPEC QL NAA+PROBE: NEGATIVE
GRAM STN SPEC: NORMAL
HADV DNA SPEC QL NAA+PROBE: NEGATIVE
HMPV RNA SPEC QL NAA+PROBE: NEGATIVE
HPIV1 RNA SPEC QL NAA+PROBE: NEGATIVE
HPIV2 SPEC QL CULT: NEGATIVE
HPIV3 SPEC QL CULT: NEGATIVE
RHINOVIRUS RNA SPEC QL NAA+PROBE: NEGATIVE
RSV A: NEGATIVE
RSV B RNA SPEC QL NAA+PROBE: NEGATIVE

## 2018-03-13 PROCEDURE — 99232 SBSQ HOSP IP/OBS MODERATE 35: CPT | Performed by: INTERNAL MEDICINE

## 2018-03-13 PROCEDURE — 63710000001 PREDNISONE PER 1 MG: Performed by: INTERNAL MEDICINE

## 2018-03-13 PROCEDURE — 71045 X-RAY EXAM CHEST 1 VIEW: CPT

## 2018-03-13 PROCEDURE — 94799 UNLISTED PULMONARY SVC/PX: CPT

## 2018-03-13 PROCEDURE — 25010000002 HEPARIN (PORCINE) PER 1000 UNITS: Performed by: NURSE PRACTITIONER

## 2018-03-13 PROCEDURE — 99232 SBSQ HOSP IP/OBS MODERATE 35: CPT | Performed by: NURSE PRACTITIONER

## 2018-03-13 PROCEDURE — 94640 AIRWAY INHALATION TREATMENT: CPT

## 2018-03-13 RX ORDER — BUDESONIDE AND FORMOTEROL FUMARATE DIHYDRATE 160; 4.5 UG/1; UG/1
2 AEROSOL RESPIRATORY (INHALATION)
Status: DISCONTINUED | OUTPATIENT
Start: 2018-03-13 | End: 2018-03-14 | Stop reason: HOSPADM

## 2018-03-13 RX ORDER — ALBUTEROL SULFATE 2.5 MG/3ML
2.5 SOLUTION RESPIRATORY (INHALATION) EVERY 6 HOURS PRN
Status: DISCONTINUED | OUTPATIENT
Start: 2018-03-13 | End: 2018-03-14 | Stop reason: HOSPADM

## 2018-03-13 RX ORDER — LISINOPRIL 20 MG/1
20 TABLET ORAL
Status: DISCONTINUED | OUTPATIENT
Start: 2018-03-13 | End: 2018-03-14 | Stop reason: HOSPADM

## 2018-03-13 RX ADMIN — HYDROCODONE POLISTIREX AND CHLORPHENIRAMINE POLISTIREX 5 ML: 10; 8 SUSPENSION, EXTENDED RELEASE ORAL at 08:57

## 2018-03-13 RX ADMIN — Medication 2 TABLET: at 20:45

## 2018-03-13 RX ADMIN — MULTIPLE VITAMINS W/ MINERALS TAB 1 TABLET: TAB ORAL at 08:56

## 2018-03-13 RX ADMIN — IPRATROPIUM BROMIDE AND ALBUTEROL SULFATE 3 ML: 2.5; .5 SOLUTION RESPIRATORY (INHALATION) at 07:15

## 2018-03-13 RX ADMIN — ASPIRIN 325 MG: 325 TABLET, DELAYED RELEASE ORAL at 08:57

## 2018-03-13 RX ADMIN — HEPARIN SODIUM 5000 UNITS: 5000 INJECTION, SOLUTION INTRAVENOUS; SUBCUTANEOUS at 06:10

## 2018-03-13 RX ADMIN — HYDROCODONE POLISTIREX AND CHLORPHENIRAMINE POLISTIREX 5 ML: 10; 8 SUSPENSION, EXTENDED RELEASE ORAL at 20:45

## 2018-03-13 RX ADMIN — Medication 5 MG: at 20:45

## 2018-03-13 RX ADMIN — BUDESONIDE 0.5 MG: 0.5 INHALANT RESPIRATORY (INHALATION) at 07:15

## 2018-03-13 RX ADMIN — HEPARIN SODIUM 5000 UNITS: 5000 INJECTION, SOLUTION INTRAVENOUS; SUBCUTANEOUS at 20:45

## 2018-03-13 RX ADMIN — LISINOPRIL 20 MG: 20 TABLET ORAL at 10:06

## 2018-03-13 RX ADMIN — ARFORMOTEROL TARTRATE 15 MCG: 15 SOLUTION RESPIRATORY (INHALATION) at 07:17

## 2018-03-13 RX ADMIN — LEVOFLOXACIN 750 MG: 750 TABLET, FILM COATED ORAL at 12:16

## 2018-03-13 RX ADMIN — PREDNISONE 40 MG: 20 TABLET ORAL at 08:56

## 2018-03-13 RX ADMIN — ATORVASTATIN CALCIUM 20 MG: 20 TABLET, FILM COATED ORAL at 20:45

## 2018-03-13 RX ADMIN — BUDESONIDE AND FORMOTEROL FUMARATE DIHYDRATE 2 PUFF: 160; 4.5 AEROSOL RESPIRATORY (INHALATION) at 20:57

## 2018-03-13 RX ADMIN — VITAMIN D, TAB 1000IU (100/BT) 1000 UNITS: 25 TAB at 08:57

## 2018-03-13 RX ADMIN — Medication 2 TABLET: at 08:57

## 2018-03-13 RX ADMIN — LEVOTHYROXINE SODIUM 75 MCG: 75 TABLET ORAL at 06:10

## 2018-03-13 NOTE — PLAN OF CARE
Problem: Pneumonia (Adult)  Goal: Signs and Symptoms of Listed Potential Problems Will be Absent or Manageable (Pneumonia)  Outcome: Ongoing (interventions implemented as appropriate)   03/13/18 1516   Pneumonia   Problems Present (Pneumonia) none       Problem: Infection, Risk/Actual (Adult)  Goal: Infection Prevention/Resolution  Outcome: Ongoing (interventions implemented as appropriate)   03/13/18 1516   Infection, Risk/Actual (Adult)   Infection Prevention/Resolution making progress toward outcome

## 2018-03-13 NOTE — PROGRESS NOTES
[facility] Medicine Services  PROGRESS NOTE    Patient Name: Gee Whitney  : 1950  MRN: [id number]    Date of Admission: 3/7/2018  Length of Stay: 6  Primary Care Physician: Vicki Perry MD    Subjective   Subjective     CC:  FU dyspnea    HPI:  Patient sitting up in chair.  Has been ambulating in the halls.  CT removed today.  Cough improved.  Family at bedside.  Ready to go home.  BP elevated this AM, has not been on home lisinopril.      Review of Systems  Gen- No fevers, chills  CV- No chest pain, palpitations  Resp- +Cough, no dyspnea  GI- No N/V/D, abd pain    Otherwise ROS is negative except as mentioned in the HPI.    Objective   Objective     Vital Signs:   Temp:  [96.9 °F (36.1 °C)-98.4 °F (36.9 °C)] 96.9 °F (36.1 °C)  Heart Rate:  [83-99] 83  Resp:  [16-18] 16  BP: (166-174)/(78-97) 172/81        Physical Exam:  Constitutional: No acute distress, awake, alert sitting upright in chair.  No visitors at bedside.  Eyes: PERRLA, sclerae anicteric, no conjunctival injection  HENT: NCAT, mucous membranes moist  Neck: Supple, trachea midline  Respiratory: Decreased slightly right base, nonlabored respirations  Cardiovascular: RRR, no murmurs, rubs, or gallops, palpable pedal pulses bilaterally  Gastrointestinal: Positive bowel sounds, soft, nontender, nondistended. Obese.  Musculoskeletal: No bilateral ankle edema, no clubbing or cyanosis to extremities, small tender bruise on L side anteriorly upper stomach region with lidocaine patch in place.  Psychiatric: Appropriate affect, cooperative  Neurologic: Oriented x 3, strength symmetric in all extremities, Cranial Nerves grossly intact to confrontation, speech clear, follows commands.  Skin: No rashes    Results Reviewed:  I have personally reviewed current lab, radiology, and data and agree.      Results from last 7 days  Lab Units 18  0828 18  0659 03/10/18  0404 18  1201   WBC 10*3/mm3 14.55* 15.39*  15.26*  --    HEMOGLOBIN g/dL 12.9* 12.1* 12.7*  --    HEMATOCRIT % 39.5 37.2* 38.2*  --    PLATELETS 10*3/mm3 247 224 223  --    INR   --   --   --  0.98       Results from last 7 days  Lab Units 03/12/18  0828 03/11/18  0659 03/10/18  0404  03/07/18  0354   SODIUM mmol/L 138 139 135  < > 141   POTASSIUM mmol/L 4.2 4.5 4.6  < > 4.2   CHLORIDE mmol/L 103 109 105  < > 112*   CO2 mmol/L 28.0 24.0 22.0  < > 25.0   BUN mg/dL 23 23 24*  < > 32*   CREATININE mg/dL 1.20 1.10 1.00  < > 1.20   GLUCOSE mg/dL 138* 96 122*  < > 120*   CALCIUM mg/dL 9.5 9.1 9.1  < > 8.5*   ALT (SGPT) U/L  --   --   --   --  24   AST (SGOT) U/L  --   --   --   --  27   < > = values in this interval not displayed.  No results found for: BNP  No results found for: PHART    Microbiology Results Abnormal     Procedure Component Value - Date/Time    Body Fluid Culture - Body Fluid, Pleural Cavity [[id number]]  (Normal) Collected:  03/09/18 1000    Lab Status:  Final result Specimen:  Body Fluid from Pleural Cavity Updated:  03/13/18 0840     BF Culture No growth at 4 days     Gram Stain Result Many (4+) Red blood cells      Rare (1+) WBCs seen      No organisms seen    Resp Virus Profile (RVP) PCR - Swab, Nasopharynx [[id number]] Collected:  03/08/18 1558    Lab Status:  Final result Specimen:  Swab from Nasopharynx Updated:  03/13/18 0615     Influenza A PCR Negative     Influenza B PCR Negative     RSV A Negative     RSV B Negative     Parainfluenza Virus 1 Negative     Parainfluenza Virus 2 Negative     Parainfluenza Virus 3 Negative     Human Rhinovirus/Enterovirus Negative     Human Metapneumovirus Negative     Adenovirus Detection by PCR Negative    Narrative:       Performed at:  [address] [place] [address]  [id number]  : Castillo Mack MD, Phone:  [phone]    Bordetella Pertussis / Parapertussis PCR - Swab, Nasopharynx [[id number]] Collected:  03/09/18 2045    Lab Status:  Final result Specimen:  Swab  from Nasopharynx Updated:  03/13/18 0615     Bordetella pertussis DNA Negative     B. parapertussis DNA Negative     Comment: This test was developed and its performance characteristics determined  by [facility]. It has not been cleared or approved by the  [place]. Food and Drug Administration. The FDA has determined that such  clearance or approval is not necessary. This test is used for clinical  purposes. It should not be regarded as investigational or research.       Narrative:       Performed at:  [address]  [id number]  : Castillo Mack MD, Phone:  [phone]    Fungus Smear - Body Fluid, Pleural Cavity [[id number]] Collected:  03/09/18 1000    Lab Status:  Final result Specimen:  Body Fluid from Pleural Cavity Updated:  03/12/18 1516     GMS Stain No yeast or hyphal elements seen    Respiratory Culture - Sputum, Cough [[id number]]  (Abnormal)  (Susceptibility) Collected:  03/09/18 2000    Lab Status:  Final result Specimen:  Sputum from Cough Updated:  03/12/18 1134     Respiratory Culture --      Moderate growth (3+) Pseudomonas aeruginosa (A)      Moderate growth (3+) Normal Respiratory Nancie     Gram Stain Result Few (2+) WBCs seen      Moderate (3+) Gram positive cocci in chains      Few (2+) Gram negative bacilli    Susceptibility      Pseudomonas aeruginosa     NÉSTOR     Aztreonam <=8 ug/ml Susceptible     Cefepime <=8 ug/ml Susceptible     Ceftazidime 4 ug/ml Susceptible     Gentamicin <=4 ug/ml Susceptible     Levofloxacin <=2 ug/ml Susceptible     Meropenem <=1 ug/ml Susceptible     Piperacillin + Tazobactam <=16 ug/ml Susceptible     Tobramycin <=4 ug/ml Susceptible                    Body Fluid Culture - Body Fluid, Pleural Cavity [[id number]]  (Normal) Collected:  03/08/18 1408    Lab Status:  Final result Specimen:  Body Fluid from Pleural Cavity Updated:  03/12/18 0818     BF Culture No growth at 4 days     Gram Stain Result Occasional  WBCs seen      No organisms seen    Blood Culture - Blood, [[id number]]  (Normal) Collected:  03/07/18 0503    Lab Status:  Final result Specimen:  Blood from Arm, Left Updated:  03/12/18 0616     Blood Culture No growth at 5 days    Blood Culture - Blood, [[id number]]  (Normal) Collected:  03/07/18 0445    Lab Status:  Final result Specimen:  Blood from Arm, Right Updated:  03/12/18 0616     Blood Culture No growth at 5 days    AFB Culture - Body Fluid, Pleural Cavity [[id number]] Collected:  03/09/18 1000    Lab Status:  Preliminary result Specimen:  Body Fluid from Pleural Cavity Updated:  03/10/18 1358     AFB Stain No acid fast bacilli seen on concentrated smear    Anaerobic Culture - Body Fluid, Pleural Cavity [[id number]]  (Normal) Collected:  03/09/18 1000    Lab Status:  Preliminary result Specimen:  Body Fluid from Pleural Cavity Updated:  03/09/18 1305     Culture No anaerobes isolated    Anaerobic Culture - Pleural Fluid, Pleural Cavity [[id number]]  (Normal) Collected:  03/08/18 1409    Lab Status:  Preliminary result Specimen:  Pleural Fluid from Pleural Cavity Updated:  03/09/18 1304     Culture No anaerobes isolated    AFB Culture - Body Fluid, Pleural Cavity [[id number]] Collected:  03/08/18 1408    Lab Status:  Preliminary result Specimen:  Body Fluid from Pleural Cavity Updated:  03/09/18 1125     AFB Stain No acid fast bacilli seen on concentrated smear    MRSA Screen, PCR - Swab, Nares [[id number]]  (Abnormal) Collected:  03/08/18 1605    Lab Status:  Final result Specimen:  Swab from Nares Updated:  03/08/18 1724     MRSA, PCR Positive (C)    Influenza A & B, RT PCR - Swab, Nasopharynx [[id number]]  (Normal) Collected:  03/08/18 1605    Lab Status:  Final result Specimen:  Swab from Nasopharynx Updated:  03/08/18 1648     Influenza A PCR Not Detected     Influenza B PCR Not Detected    KOH Prep - Body Fluid, Chest [[id number]]  (Normal) Collected:  03/08/18 1409    Lab Status:  Final result Specimen:   Body Fluid from Chest Updated:  03/08/18 1638     KOH Prep No yeast or hyphal elements seen          Imaging Results (last 24 hours)     Procedure Component Value Units Date/Time    XR Chest 1 View [[id number]] Collected:  03/13/18 1009     Updated:  03/13/18 1145    Narrative:       EXAMINATION: XR CHEST 1 VW-03/13/2018:      INDICATION: Postop; J18.1-Lobar pneumonia, unspecified organism;  R55-Syncope and collapse; S01.01XA-Laceration without foreign body of  scalp, initial encounter.      COMPARISON: 03/12/2018.     FINDINGS: The cardiac silhouette is normal. A right chest tube is well  positioned. There is no pneumothorax or pulmonary inflammatory process.            Impression:       There are no acute findings.     D:  03/13/2018  E:  03/13/2018     This report was finalized on 3/13/2018 11:43 AM by Dr. Vasyl Franz MD.           I have reviewed the medications.    Assessment/Plan   Assessment / Plan     Hospital Problem List     * (Principal)Community acquired pneumonia of right lower lobe of lung    Coronary artery disease    Hypertension    Dyslipidemia    Hypothyroidism    Hemothorax on right    Acute viral bronchitis with RADS        Brief Hospital Course to date:  Gee Whitney is a 67 y.o. male PMH of limited tobacco abuse, remote MI, HTN, HLD, URI sx x 10 days admitted 3/7/2018 w/ syncope, found to have moderate sized right pleural effusion and possible pleural thickening    Assessment & Plan:    - R sided hemothorax from a fall: s/p chest tube placement 3/9. Removed today per CTS.  CXR in the AM.  - CAP with reactive airway disease: Completed 5 days of abx, prednisone D3, nebs.  However cultures showed pseudomonas.  Continue oral Levaquin 750 mg daily.      DVT Prophylaxis:  [facility]    CODE STATUS: Full Code    Disposition: I expect the patient to be discharged tomorrow if stable.      Carmen Wang, APRN  03/13/18  1:27 PM

## 2018-03-13 NOTE — PROGRESS NOTES
Cardiothoracic Surgery Progress Note      PPD # 3 s/p Right tube thoracostomy    Chief complaint: Chest tube pain     LOS: 6 days      Subjective:  Mild pain at chest tube site.  No other complaints.      Objective:  Vital Signs  Temp:  [96.9 °F (36.1 °C)-98.4 °F (36.9 °C)] 96.9 °F (36.1 °C)  Heart Rate:  [83-99] 83  Resp:  [16-18] 16  BP: (166-174)/(78-97) 172/81    Physical Exam:   General Appearance: alert, appears stated age and cooperative   Lungs: clear to auscultation, respirations regular, respirations even and respirations unlabored   Heart: regular rhythm & normal rate, normal S1, S2 and no murmur, no era, no rub   Chest tube: 80 cc / 24 hrs. No air leak.     Results:    Results from last 7 days  Lab Units 03/12/18  0828   WBC 10*3/mm3 14.55*   HEMOGLOBIN g/dL 12.9*   HEMATOCRIT % 39.5   PLATELETS 10*3/mm3 247       Results from last 7 days  Lab Units 03/12/18  0828   SODIUM mmol/L 138   POTASSIUM mmol/L 4.2   CHLORIDE mmol/L 103   CO2 mmol/L 28.0   BUN mg/dL 23   CREATININE mg/dL 1.20   GLUCOSE mg/dL 138*   CALCIUM mg/dL 9.5         Assessment:  Right hemothorax  Final Cytology-Negative for malignancy.  Scattered benign mesothelial cells with a sanguineous background.    Plan:  D/C chest tube-pulled   Pulmonary toilet    Ling Rashid PA-C  03/13/18  10:33 AM

## 2018-03-13 NOTE — PROGRESS NOTES
" PULMONARY NOTE     Hospital Day: 6    Mr. Gee Whitney, 67 y.o. male is followed for:   Hemothorax and reactive airways disease         SUBJECTIVE     67 y.o.male with relevant PMH of limited tobacco abuse, remote MI, HTN, HLD, URI sx x 10 days as well as severe coughing paroxysms admitted 3/7/2018 w/ syncope, found to have moderate sized right pleural effusion and possible pleural thickening. Thoracentesis c/w hemothorax.  Now s/p large bore CT.  He was felt to have a severe viral bronchitis with reactive airways disease/wheezing that precipitated his cough-induced chest trauma    Interval history:    Chest tube out.  Cough is mild.  Prednisone completed.  Afebrile.  Ambulating in khalil.  Chest x-ray clear.    The patient's relevant past medical, surgical and social history were reviewed and updated in Epic as appropriate.        OBJECTIVE     Vital Sign Min/Max for last 24 hours  Temp  Min: 96.9 °F (36.1 °C)  Max: 98.4 °F (36.9 °C)   BP  Min: 166/97  Max: 174/84   Pulse  Min: 83  Max: 99   Resp  Min: 16  Max: 18   SpO2  Min: 94 %  Max: 99 %   No Data Recorded   No Data Recorded      Intake/Output Summary (Last 24 hours) at 03/13/18 1514  Last data filed at 03/13/18 0900   Gross per 24 hour   Intake              180 ml   Output             3205 ml   Net            -3025 ml      Flowsheet Rows    Flowsheet Row First Filed Value   Admission Height 175.3 cm (69\") Documented at 03/07/2018 0303   Admission Weight 77.1 kg (170 lb) Documented at 03/07/2018 0303        Body mass index is 25.04 kg/m². 1    03/07/18  0303 03/07/18  0623 03/12/18  0602   Weight: 77.1 kg (170 lb) 77.2 kg (170 lb 3.2 oz) 79.2 kg (174 lb 8 oz)             Objective:  General Appearance:  In no acute distress.    Vital signs: (most recent): Blood pressure 172/81, pulse 83, temperature 96.9 °F (36.1 °C), temperature source Oral, resp. rate 16, height 177.8 cm (70\"), weight 79.2 kg (174 lb 8 oz), SpO2 94 %.    HEENT: Normal HEENT exam.  "   Lungs:  Normal effort and normal respiratory rate.  He is not in respiratory distress.  There are rales and wheezes.  No rhonchi.  (Very mild wheezes and a few bibasilar crackles)  Heart: Normal rate.  Regular rhythm.  S1 normal and S2 normal.  No murmur, gallop or friction rub.   Chest: Symmetric chest wall expansion. (Ecchymoses present)  Abdomen: Abdomen is soft and non-distended.  Bowel sounds are normal.   There is no abdominal tenderness.   There is no mass. There is no splenomegaly. There is no hepatomegaly.   Extremities: There is no deformity or dependent edema.    Neurological: Patient is alert and oriented to person, place and time.    Pupils:  Pupils are equal, round, and reactive to light.    Skin:  Warm and dry.                      I reviewed the patient's results and images, including reviewing images and reports.    Medications reviewed.      Scheduled Meds:    aspirin  mg Oral Daily   atorvastatin 20 mg Oral Nightly   budesonide-formoterol 2 puff Inhalation BID - RT   cholecalciferol 1,000 Units Oral Daily   heparin (porcine) 5,000 Units Subcutaneous Q8H   HYDROcod Polst-CPM Polst ER 5 mL Oral BID   levoFLOXacin 750 mg Oral Daily   levothyroxine 75 mcg Oral Q AM   lidocaine 1 patch Transdermal Q24H   lisinopril 20 mg Oral Q24H   melatonin 5 mg Sublingual Nightly   multivitamin with minerals 1 tablet Oral Daily   sennosides-docusate sodium 2 tablet Oral BID         Assessment/Plan   ASSESSMENT      Hospital Problem List     * (Principal)Community acquired pneumonia of right lower lobe of lung    Hemothorax on right    Acute viral bronchitis with RADS    Coronary artery disease    Hypertension    Dyslipidemia    Hypothyroidism            He has what appears to be an acute viral bronchitis with a significant reactive airways disease precipitating severe cough and resulting chest trauma with right-sided hemothorax requiring chest tube placement.  He is improved with antibiotics, steroids, and  chest tube drainage.  He is growing Pseudomonas from his sputum.  This is of unclear pathogenicity as he is not producing purulent sputum anymore and maybe airway colonization due to his recent antibiotics.          RECOMMENDATIONS     1. I changed him to Symbicort and as needed albuterol and he can go home on these.  2. Complete 5 day course of Levaquin in total   3. Finished steroid pulse  4. Chest tube out.  X-ray ordered for a.m.  5. Okay for discharge tomorrow if chest x-ray acceptable.  Have arranged for follow-up in our office.  Medication recommendations as above.           I discussed the patient's findings and my recommendations with patient and family         Jax Purcell MD  Pulmonary and Critical Care Medicine

## 2018-03-13 NOTE — PLAN OF CARE
Problem: Patient Care Overview (Adult)  Goal: Plan of Care Review  Outcome: Ongoing (interventions implemented as appropriate)   03/13/18 0415   Coping/Psychosocial Response Interventions   Plan Of Care Reviewed With patient;spouse   Patient Care Overview   Progress improving   Outcome Evaluation   Outcome Summary/Follow up Plan No c/o pain or nausea, chest tube remains in place. VSS, pt sleeping soundly. Will continue to monitor.        Problem: Pneumonia (Adult)  Goal: Signs and Symptoms of Listed Potential Problems Will be Absent or Manageable (Pneumonia)  Outcome: Ongoing (interventions implemented as appropriate)   03/13/18 0415   Pneumonia   Problems Assessed (Pneumonia) all   Problems Present (Pneumonia) respiratory compromise       Problem: Infection, Risk/Actual (Adult)  Goal: Infection Prevention/Resolution  Outcome: Ongoing (interventions implemented as appropriate)   03/13/18 0415   Infection, Risk/Actual (Adult)   Infection Prevention/Resolution making progress toward outcome

## 2018-03-14 ENCOUNTER — APPOINTMENT (OUTPATIENT)
Dept: GENERAL RADIOLOGY | Facility: HOSPITAL | Age: 68
End: 2018-03-14

## 2018-03-14 VITALS
HEART RATE: 77 BPM | OXYGEN SATURATION: 94 % | BODY MASS INDEX: 25 KG/M2 | TEMPERATURE: 97.3 F | SYSTOLIC BLOOD PRESSURE: 156 MMHG | WEIGHT: 174.6 LBS | HEIGHT: 70 IN | DIASTOLIC BLOOD PRESSURE: 84 MMHG | RESPIRATION RATE: 18 BRPM

## 2018-03-14 PROBLEM — J94.2 HEMOTHORAX ON RIGHT: Status: RESOLVED | Noted: 2018-03-12 | Resolved: 2018-03-14

## 2018-03-14 PROBLEM — J20.8 ACUTE VIRAL BRONCHITIS: Status: RESOLVED | Noted: 2018-03-12 | Resolved: 2018-03-14

## 2018-03-14 PROCEDURE — 71045 X-RAY EXAM CHEST 1 VIEW: CPT

## 2018-03-14 PROCEDURE — 94760 N-INVAS EAR/PLS OXIMETRY 1: CPT

## 2018-03-14 PROCEDURE — 94640 AIRWAY INHALATION TREATMENT: CPT

## 2018-03-14 PROCEDURE — 99239 HOSP IP/OBS DSCHRG MGMT >30: CPT | Performed by: NURSE PRACTITIONER

## 2018-03-14 RX ORDER — LEVOFLOXACIN 750 MG/1
750 TABLET ORAL
Qty: 4 TABLET | Refills: 0 | Status: SHIPPED | OUTPATIENT
Start: 2018-03-15 | End: 2018-03-19

## 2018-03-14 RX ORDER — ALBUTEROL SULFATE 90 UG/1
2 AEROSOL, METERED RESPIRATORY (INHALATION) EVERY 6 HOURS PRN
Qty: 1 INHALER | Refills: 0 | Status: SHIPPED | OUTPATIENT
Start: 2018-03-14 | End: 2019-03-11

## 2018-03-14 RX ORDER — BUDESONIDE AND FORMOTEROL FUMARATE DIHYDRATE 160; 4.5 UG/1; UG/1
2 AEROSOL RESPIRATORY (INHALATION)
Qty: 1 INHALER | Refills: 0 | Status: SHIPPED | OUTPATIENT
Start: 2018-03-14 | End: 2019-03-11

## 2018-03-14 RX ADMIN — LEVOTHYROXINE SODIUM 75 MCG: 75 TABLET ORAL at 05:48

## 2018-03-14 RX ADMIN — BUDESONIDE AND FORMOTEROL FUMARATE DIHYDRATE 2 PUFF: 160; 4.5 AEROSOL RESPIRATORY (INHALATION) at 08:42

## 2018-03-14 RX ADMIN — MULTIPLE VITAMINS W/ MINERALS TAB 1 TABLET: TAB ORAL at 08:48

## 2018-03-14 RX ADMIN — Medication 2 TABLET: at 08:48

## 2018-03-14 RX ADMIN — LEVOFLOXACIN 750 MG: 750 TABLET, FILM COATED ORAL at 12:26

## 2018-03-14 RX ADMIN — ASPIRIN 325 MG: 325 TABLET, DELAYED RELEASE ORAL at 08:49

## 2018-03-14 RX ADMIN — LISINOPRIL 20 MG: 20 TABLET ORAL at 08:49

## 2018-03-14 RX ADMIN — HYDROCODONE POLISTIREX AND CHLORPHENIRAMINE POLISTIREX 5 ML: 10; 8 SUSPENSION, EXTENDED RELEASE ORAL at 08:48

## 2018-03-14 RX ADMIN — VITAMIN D, TAB 1000IU (100/BT) 1000 UNITS: 25 TAB at 08:49

## 2018-03-14 NOTE — PROGRESS NOTES
"Adult Nutrition  Assessment/PES    Patient Name:  Gee Whitney  YOB: 1950  MRN: [id number]  Admit Date:  3/7/2018    Assessment Date:  3/14/2018  Time: 20 minutes    Adult Nutrition Assessment      Reason for Assessment    Reason For Assessment --   length of stay      Row Name 03/14/18 1319    Anthropometrics    Height 177.8 cm (70\")    Weight 79.2 kg (174 lb 9.7 oz)   standing scale weight per charting (3/12)       Ideal Body Weight (IBW)    Ideal Body Weight (IBW) (kg) 76.48    % Ideal Body Weight 103.56       Body Mass Index (BMI)    BMI (kg/m2) 25.11      Nutrition Prescription PO    Current PO Diet Regular    Common Modifiers Cardiac       Row Name 03/14/18 1321       PO Evaluation    Number of Days PO Intake Evaluated --   PO intake recorded from (3/12 - 3/13)    Number of Meals 4    % PO Intake 81%    Row Name 03/14/18 1320       Labs/Procedures/Meds    Lab Results Reviewed reviewed       Problem/Interventions:        Problem 1     Row Name 03/14/18 1322       Nutrition Diagnoses Problem 1    Problem 1 No Nutrition Diagnosis at this Time                Intervention Goal     Row Name 03/14/18 1322       Intervention Goal    General Nutrition support treatment            Nutrition Intervention     Row Name 03/14/18 1322       Nutrition Intervention    RD/Tech Action Follow Tx progress;Care plan reviewd              Education/Evaluation     Row Name 03/14/18 1323       Monitor/Evaluation    Monitor Per protocol;PO intake        Electronically signed by:  Zayra Caraballo  03/14/18 1:24 PM  "

## 2018-03-14 NOTE — DISCHARGE SUMMARY
[facility] Medicine Services  DISCHARGE SUMMARY    Patient Name: Gee Whitney  : 1950  MRN: [id number]    Date of Admission: 3/7/2018  Date of Discharge:  3/14/2018  Primary Care Physician: Vicki Perry MD    Consults     Date and Time Order Name Status Description    3/8/2018 1605 Inpatient Cardiothoracic Surgery Consult Completed     3/7/2018 1704 Inpatient Pulmonology Consult Completed         Hospital Course     Presenting Problem:   Community acquired pneumonia of right lower lobe of lung [J18.1]    Active Hospital Problems (** Indicates Principal Problem)    Diagnosis Date Noted   • **Community acquired pneumonia of right lower lobe of lung [J18.1] 2018   • Hypothyroidism [E03.9] 2018   • Coronary artery disease [I25.10] 2017   • Dyslipidemia [E78.5] 2017   • Hypertension [I10] 2017      Resolved Hospital Problems    Diagnosis Date Noted Date Resolved   • Hemothorax on right [J94.2] 2018   • Acute viral bronchitis with RADS [J20.8] 2018      Hospital Course:  Gee Whitney is a 67 y.o. male with history of limited tobacco abuse, remote PI, hypertension, hyperlipidemia, URI symptoms ×10 days admitted 3/7/2018 with syncope.  Patient was found to have moderate size right pleural effusion and possible pleural thickening.  Pulmonary was consulted and followed during hospitalization.  He appeared to have an acute viral brochitis with significant reactive airway disease precipitating severe cough resulting chest trauma with right sided hemothorax requiring chest tube placement on 3/9/18.  He improved with antibiotics, steroids, and chest tube drainage.  He is growing Pseudomonas from his sputum.  Pulmonary recommends Symbicort and when necessary albuterol inhaler with a 5 day treatment course of Levaquin.  Chest tube was removed on 3/13/18 and chest x-ray this a.m. That showed no evidence of pneumothorax  following right chest tube removal.  Patient is medically stable and ready for discharge home today.  He will follow-up with pulmonary in 1 week with repeat CXR.  Discharge plans and instructions were reviewed with patient and he verbalizes an understanding.        Day of Discharge     HPI:   Patient sitting up in chair without any new complaints or issues.  States he feels well and is ready to go home.  Denies chest pain, shortness of breath, or abdominal pain.  Cough has resolved.      Review of Systems  Gen- No fevers, chills  CV- No chest pain, palpitations  Resp- No cough, dyspnea  GI- No N/V/D, abd pain    Otherwise ROS is negative except as mentioned in the HPI.    Vital Signs:   Temp:  [97.3 °F (36.3 °C)-99 °F (37.2 °C)] 97.3 °F (36.3 °C)  Heart Rate:  [] 77  Resp:  [16-18] 18  BP: (142-156)/(84-86) 156/84     Physical Exam:  Constitutional: No acute distress, awake, alert  HENT: NCAT, mucous membranes moist  Respiratory: Clear to auscultation bilaterally, respiratory effort normal, breathing on room air  Cardiovascular: RRR, no murmurs, rubs, or gallops, palpable pedal pulses bilaterally  Gastrointestinal: Positive bowel sounds, soft, nontender, nondistended  Musculoskeletal: No bilateral ankle edema  Psychiatric: Appropriate affect, cooperative  Neurologic: Oriented x 3, strength symmetric in all extremities, Cranial Nerves grossly intact to confrontation, speech clear  Skin: 3 staples removed from back of head CDI, old right chest tube site with dressing CDI    Pertinent  and/or Most Recent Results       Results from last 7 days  Lab Units 03/12/18  0828 03/11/18  0659 03/10/18  0404 03/08/18  0450 03/08/18  0449   WBC 10*3/mm3 14.55* 15.39* 15.26* 17.64*  --    HEMOGLOBIN g/dL 12.9* 12.1* 12.7* 12.1*  --    HEMATOCRIT % 39.5 37.2* 38.2* 36.8*  --    PLATELETS 10*3/mm3 247 224 223 187  --    SODIUM mmol/L 138 139 135  --  137   POTASSIUM mmol/L 4.2 4.5 4.6  --  4.5   CHLORIDE mmol/L 103 109 105  --   109   CO2 mmol/L 28.0 24.0 22.0  --  20.0   BUN mg/dL 23 23 24*  --  23   CREATININE mg/dL 1.20 1.10 1.00  --  1.00   GLUCOSE mg/dL 138* 96 122*  --  121*   CALCIUM mg/dL 9.5 9.1 9.1  --  8.7       Results from last 7 days  Lab Units 03/08/18  1201   PROTIME Seconds 10.3   INR  0.98   APTT seconds 25.0         Brief Urine Lab Results  (Last result in the past 365 days)      Color   Clarity   Blood   Leuk Est   Nitrite   Protein   CREAT   Urine HCG        03/07/18 0401 Yellow Clear Negative Negative Negative Negative               Microbiology Results Abnormal     Procedure Component Value - Date/Time    AFB Culture - Body Fluid, Pleural Cavity [[id number]]  (Normal) Collected:  03/08/18 1408    Lab Status:  Preliminary result Specimen:  Body Fluid from Pleural Cavity Updated:  03/13/18 1531     AFB Culture No AFB isolated at less than 1 week     AFB Stain No acid fast bacilli seen on concentrated smear    Fungus Culture - Body Fluid, Pleural Cavity [[id number]] Collected:  03/08/18 1408    Lab Status:  Preliminary result Specimen:  Body Fluid from Pleural Cavity Updated:  03/13/18 1531     Fungus Culture No fungus isolated at less than 1 week    Body Fluid Culture - Body Fluid, Pleural Cavity [[id number]]  (Normal) Collected:  03/09/18 1000    Lab Status:  Final result Specimen:  Body Fluid from Pleural Cavity Updated:  03/13/18 0840     BF Culture No growth at 4 days     Gram Stain Result Many (4+) Red blood cells      Rare (1+) WBCs seen      No organisms seen    Resp Virus Profile (RVP) PCR - Swab, Nasopharynx [[id number]] Collected:  03/08/18 1558    Lab Status:  Final result Specimen:  Swab from Nasopharynx Updated:  03/13/18 0615     Influenza A PCR Negative     Influenza B PCR Negative     RSV A Negative     RSV B Negative     Parainfluenza Virus 1 Negative     Parainfluenza Virus 2 Negative     Parainfluenza Virus 3 Negative     Human Rhinovirus/Enterovirus Negative     Human Metapneumovirus Negative      Adenovirus Detection by PCR Negative    Narrative:       Performed at:   - [address]  [id number]  : Castillo Mack MD, Phone:  [phone]    Bordetella Pertussis / Parapertussis PCR - Swab, Nasopharynx [[id number]] Collected:  03/09/18 2045    Lab Status:  Final result Specimen:  Swab from Nasopharynx Updated:  03/13/18 0615     Bordetella pertussis DNA Negative     B. parapertussis DNA Negative     Comment: This test was developed and its performance characteristics determined  by [facility]. It has not been cleared or approved by the  [place]. Food and Drug Administration. The FDA has determined that such  clearance or approval is not necessary. This test is used for clinical  purposes. It should not be regarded as investigational or research.       Narrative:       Performed at:  [address] [place] [address]  [id number]  : Castillo Mack MD, Phone:  [phone]    Fungus Smear - Body Fluid, Pleural Cavity [[id number]] Collected:  03/09/18 1000    Lab Status:  Final result Specimen:  Body Fluid from Pleural Cavity Updated:  03/12/18 1516     GMS Stain No yeast or hyphal elements seen    Respiratory Culture - Sputum, Cough [[id number]]  (Abnormal)  (Susceptibility) Collected:  03/09/18 2000    Lab Status:  Final result Specimen:  Sputum from Cough Updated:  03/12/18 1134     Respiratory Culture --      Moderate growth (3+) Pseudomonas aeruginosa (A)      Moderate growth (3+) Normal Respiratory Nancie     Gram Stain Result Few (2+) WBCs seen      Moderate (3+) Gram positive cocci in chains      Few (2+) Gram negative bacilli    Susceptibility      Pseudomonas aeruginosa     NÉSTOR     Aztreonam <=8 ug/ml Susceptible     Cefepime <=8 ug/ml Susceptible     Ceftazidime 4 ug/ml Susceptible     Gentamicin <=4 ug/ml Susceptible     Levofloxacin <=2 ug/ml Susceptible     Meropenem <=1 ug/ml Susceptible     Piperacillin +  Tazobactam <=16 ug/ml Susceptible     Tobramycin <=4 ug/ml Susceptible                    Body Fluid Culture - Body Fluid, Pleural Cavity [[id number]]  (Normal) Collected:  03/08/18 1408    Lab Status:  Final result Specimen:  Body Fluid from Pleural Cavity Updated:  03/12/18 0818     BF Culture No growth at 4 days     Gram Stain Result Occasional WBCs seen      No organisms seen    Blood Culture - Blood, [[id number]]  (Normal) Collected:  03/07/18 0503    Lab Status:  Final result Specimen:  Blood from Arm, Left Updated:  03/12/18 0616     Blood Culture No growth at 5 days    Blood Culture - Blood, [[id number]]  (Normal) Collected:  03/07/18 0445    Lab Status:  Final result Specimen:  Blood from Arm, Right Updated:  03/12/18 0616     Blood Culture No growth at 5 days    AFB Culture - Body Fluid, Pleural Cavity [[id number]] Collected:  03/09/18 1000    Lab Status:  Preliminary result Specimen:  Body Fluid from Pleural Cavity Updated:  03/10/18 1358     AFB Stain No acid fast bacilli seen on concentrated smear    Anaerobic Culture - Body Fluid, Pleural Cavity [[id number]]  (Normal) Collected:  03/09/18 1000    Lab Status:  Preliminary result Specimen:  Body Fluid from Pleural Cavity Updated:  03/09/18 1305     Culture No anaerobes isolated    Anaerobic Culture - Pleural Fluid, Pleural Cavity [[id number]]  (Normal) Collected:  03/08/18 1409    Lab Status:  Preliminary result Specimen:  Pleural Fluid from Pleural Cavity Updated:  03/09/18 1304     Culture No anaerobes isolated    MRSA Screen, PCR - Swab, Nares [[id number]]  (Abnormal) Collected:  03/08/18 1605    Lab Status:  Final result Specimen:  Swab from Nares Updated:  03/08/18 1724     MRSA, PCR Positive (C)    Influenza A & B, RT PCR - Swab, Nasopharynx [[id number]]  (Normal) Collected:  03/08/18 1605    Lab Status:  Final result Specimen:  Swab from Nasopharynx Updated:  03/08/18 1648     Influenza A PCR Not Detected     Influenza B PCR Not Detected    KOH Prep -  Body Fluid, Chest [[id number]]  (Normal) Collected:  03/08/18 1409    Lab Status:  Final result Specimen:  Body Fluid from Chest Updated:  03/08/18 1638     KOH Prep No yeast or hyphal elements seen          Imaging Results (all)     Procedure Component Value Units Date/Time    XR Chest 1 View [[id number]] Updated:  03/14/18 0553    XR Chest 1 View [[id number]] Collected:  03/13/18 1009     Updated:  03/13/18 1145    Narrative:       EXAMINATION: XR CHEST 1 VW-03/13/2018:      INDICATION: Postop; J18.1-Lobar pneumonia, unspecified organism;  R55-Syncope and collapse; S01.01XA-Laceration without foreign body of  scalp, initial encounter.      COMPARISON: 03/12/2018.     FINDINGS: The cardiac silhouette is normal. A right chest tube is well  positioned. There is no pneumothorax or pulmonary inflammatory process.            Impression:       There are no acute findings.     D:  03/13/2018  E:  03/13/2018     This report was finalized on 3/13/2018 11:43 AM by Dr. Vasyl Franz MD.       XR Chest 1 View [[id number]] Collected:  03/12/18 0906     Updated:  03/12/18 1035    Narrative:       EXAMINATION: XR CHEST 1 VW- 03/12/2018     INDICATION: postop; J18.1-Lobar pneumonia, unspecified organism;  R55-Syncope and collapse; S01.01XA-Laceration without foreign body of  scalp, initial encounter      COMPARISON: 03/11/2018     FINDINGS: Portable chest reveals no pneumothorax seen on the right with  right chest tube in place. Heart is borderline enlarged. No new focal  parenchymal opacification present. Pulmonary vascularity is within  normal limits.           Impression:       No definite pneumothorax on the right. Right chest tubes in  place. Heart is enlarged.     D:  03/12/2018  E:  03/12/2018     This report was finalized on 3/12/2018 10:33 AM by Dr. Claudia Renteria MD.       XR Chest 1 View [[id number]] Collected:  03/11/18 1540     Updated:  03/11/18 1819    Narrative:          EXAMINATION: XR CHEST 1 VW - 03/11/2018      INDICATION: J18.1-Lobar pneumonia, unspecified organism; R55-Syncope and  collapse; S01.01XA-Laceration without foreign body of scalp, initial  encounter.      COMPARISON: None.     FINDINGS:   1. The right chest tube is well-positioned. There is a small right  apical cap pneumothorax noted which is likely not significant.  3. The left lung is clear. The heart is normal. There is mild volume  reduction at the bases.           Impression:       Stable findings in the chest when compared yesterday as  described above.     DICTATED:     03/11/2018  EDITED/ls :     03/11/2018      This report was finalized on 3/11/2018 6:17 PM by Dr. Deandre Garcia MD.       XR Chest 1 View [[id number]] Collected:  03/10/18 1500     Updated:  03/10/18 1822    Narrative:          EXAMINATION: XR CHEST 1 VW - 03/10/2018     INDICATION:  J18.1-Lobar pneumonia, unspecified organism; R55-Syncope  and collapse; S01.01XA-Laceration without foreign body of scalp, initial  encounter.     COMPARISON: None.     FINDINGS:   1. Right chest tube is well-positioned. Significant pneumothorax is not  identified. There may be a trace apical cap. It is currently not  significant.     2. Remainder of the lungs are clear. The heart size is normal           Impression:          Stable findings in the chest when compared yesterday as described. There  may be a tiny trace apical cap on the right which is currently not  considered significant.     There is no active disease, edema or consolidation otherwise and the  right chest tube is well-positioned.     DICTATED:     03/10/2018  EDITED/ls :     03/10/2018      This report was finalized on 3/10/2018 6:20 PM by Dr. Deandre Garcia MD.       XR Chest 1 View [[id number]] Collected:  03/09/18 1013     Updated:  03/09/18 1021    Narrative:       EXAMINATION: XR CHEST 1 VW-03/09/2018:      INDICATION: Postop; J18.1-Lobar pneumonia, unspecified organism;  R55-Syncope and collapse; S01.01XA-Laceration without foreign  body of  scalp, initial encounter.      COMPARISON: 03/07/2018.     FINDINGS: Portable chest reveals a chest tube identified on the right.  There is no definite pneumothorax. Slight volume loss on the right. The  heart is enlarged. The left lung is clear.           Impression:       Chest tube on the right with slight volume loss and no  evidence of pneumothorax.     D:  03/09/2018  E:  03/09/2018     This report was finalized on 3/9/2018 10:19 AM by Dr. Claudia Renteria MD.       CT Guided Thoracentesis [[id number]] Collected:  03/08/18 1542     Updated:  03/08/18 1707    Narrative:       EXAMINATION: CT GUIDED THORACENTESIS - 03/08/2018     INDICATION:  J18.1-Lobar pneumonia, unspecified organism; R55-Syncope  and collapse; S01.01XA-Laceration without foreign body of scalp, initial  encounter.     TECHNIQUE: Limited helical CT scanning of the chest without intravenous  contrast.     The radiation dose reduction device was turned on for each scan per the  ALARA (As Low as Reasonably Achievable) protocol.     COMPARISON: None.     FINDINGS: Limited helical CT scanning demonstrates trace right pleural  effusion with considerable atelectasis of the right lower lobe  associated.     Patient referred for CT-guided thoracentesis. Informed consent obtained  and signed. Patient placed in VILLASENOR position on the scanner table. Patient  prepped and draped in typical sterile fashion. 1% lidocaine used for  local anesthetic with deeper numbing to the level of the pleural  surface. Subsequent insertion of 22-gauge spinal needle into the pleural  fluid collection with return of viscous dark red fluid obtained. Patient  tolerated well without complication.       Impression:       Satisfactory CT-guided diagnostic thoracentesis.     DICTATED:     03/08/2018  EDITED    :     03/08/2018      This report was finalized on 3/8/2018 5:05 PM by Dr. Avelino Reddy.       CT Angiogram Chest With & Without Contrast [[id number]] Collected:   03/07/18 0502     Updated:  03/07/18 0623    Narrative:       EXAM:    CT Angiography Chest With Intravenous Contrast    EXAM DATE/TIME:    3/7/2018 5:02 AM    CLINICAL HISTORY:    67 years old, male; Laceration without foreign body of scalp, initial   encounter; Lobar pneumonia, unspecified organism; Syncope and collapse; Pain;   Chest pain; Left-sided chest pain; Additional info: Syncope, SOB, chest pain,   elevated ddimer    TECHNIQUE:    Axial computed tomographic angiography images of the chest with intravenous   contrast using pulmonary embolism protocol.  All CT scans at this facility use   one or more dose reduction techniques, viz.: automated exposure control; ma/kV   adjustment per patient size (including targeted exams where dose is matched to   indication; i.e. head); or iterative reconstruction technique.    MIP reconstructed images were created and reviewed.    CONTRAST:    50 mL of isovue 370 administered intravenously.    COMPARISON:    CR - XR CHEST 1 VW 2018-03-07 02:58    FINDINGS:    Pulmonary arteries:  No evidence of filling defects within the visualized   pulmonary arterial circulation to suggest pulmonary embolism.    Aorta:  Thoracic aorta demonstrates no dissection or aneurysm with motion   artifact in the ascending thoracic aorta.  The vasculature including the   coronary arteries demonstrates moderate atherosclerotic calcification.    Lungs:  Compressive atelectasis throughout the posterior RIGHT lung secondary   to supine positioning and adjacent pleural effusion.  Although there is central   peribronchial thickening in the perihilar regions, there is diffuse more   peripheral peribronchial thickening RIGHT lung base.  Scattered pulmonary   calcified granulomas.      Pleural space:  Moderate RIGHT pleural effusion layering posteriorly.    Diffuse thickening of the RIGHT medial pleural space and RIGHT pleural apex.      No LEFT pleural effusion.  No pneumothorax.    Heart:  Heart appears  "enlarged.  No pericardial effusion.  No evidence of RV   dysfunction.    Mediastinum:  Small hiatal hernia with likely gastroesophageal reflux.    Bones/joints:  No acute bone abnormality.    Lymph nodes:  Possible 12 mm RIGHT infrahilar lymph node.  No additional   significant lymphadenopathy.      Upper abdomen:  The visualized abdominal organs show no evidence of acute   pathologic processes or injury.  Splenic calcified granulomas.  Likely   bilateral renal cysts, incompletely characterized.      Impression:         Moderate RIGHT pleural effusion with compressive atelectasis throughout the   posterior lung.    Peripheral peribronchial thickening RIGHT lung base possibly secondary to the   atelectasis although inflammation, edema, or infiltrating process not excluded.    Possible pleural thickening along the RIGHT mediastinum and RIGHT apex or small   amount of loculated fluid extending into the RIGHT medial pleural space.    Although a pleural mass cannot be excluded, this finding was not demonstrated   on the previous 2 view chest x-ray 3/6/2018 and appears to be fairly acute   finding.    Possible RIGHT hilar lymphadenopathy.    Additional nonacute findings as noted.    THIS DOCUMENT HAS BEEN ELECTRONICALLY SIGNED BY EVIN BLOOM MD    XR Chest 1 View [[id number]] Collected:  03/07/18 0306     Updated:  03/07/18 0405    Narrative:       EXAM:    XR Chest, 1 View    EXAM DATE/TIME:    3/7/2018 3:06 AM    CLINICAL HISTORY:    67 years old, male; Signs and symptoms; Cough and shortness of breath;   Symptoms not specified; Patient HX: Weak/dizzy/ams triage protocol, dizziness   head laceration patient states that he's having \"chest pressure across his   chest and a cough x 1 week, but worse tonight. \" patient also states that he   \"became dizzy and fell. \" laceration on the back of the patient's head. HX:   Coronary artery disease hypertension dyslipidemia    TECHNIQUE:    Single upright AP portable chest at 2:59 " AM    COMPARISON:    CR - XR CHEST PA AND LATERAL 2018-03-06 08:43    FINDINGS:    Mild hazy density RIGHT lung base, new.  Possible minimal RIGHT pleural effusion.  No pulmonary edema, pneumothorax, or LEFT pleural effusion.  Cardiomediastinal silhouette within normal limits.  Mild degenerative changes of the spine.  No acute bone abnormality.      Impression:         Early pneumonia and/or atelectasis RIGHT lung base with possible pleural   effusion.    No other acute finding or significant change since 03/06/2018.    THIS DOCUMENT HAS BEEN ELECTRONICALLY SIGNED BY EVIN BLOOM MD    CT Head Without Contrast [[id number]] Collected:  03/07/18 0320     Updated:  03/07/18 0405    Narrative:       EXAM:    CT Head Without Intravenous Contrast    EXAM DATE/TIME:    3/7/2018 3:20  AM    CLINICAL HISTORY:    67 years old, male; Signs and symptoms; Dizziness; Patient HX: ? Syncopal   episode that resulted in fall with subsequent midline posterior scalp lac;   Additional info: Fall/dizzy/hit head/scalp laceration    TECHNIQUE:    Axial computed tomography images of the head/brain without intravenous   contrast.  All CT scans at this facility use one or more dose reduction   techniques, viz.: automated exposure control; ma/kV adjustment per patient size   (including targeted exams where dose is matched to indication; i.e. head); or   iterative reconstruction technique.    COMPARISON:    No relevant prior studies available.    FINDINGS:    Brain:  No CT evidence of mass, edema, or acute infarct.  No hemorrhage.    Possible developmental variation versus arachnoid cyst posterior fossa.    Ventricles:  No ventriculomegaly.      Bones/joints:  No acute bone abnormality.    Soft tissues:  Unremarkable as visualized.    Sinuses:  No fluid or acute sinusitis.  Chronic appearing mucoperiosteal   thickening ethmoid sinuses.    Mastoid air cells:  Unremarkable as visualized.  No mastoid effusion.      Impression:           No  evidence of acute intracranial abnormality.    Additional nonacute findings as noted.      THIS DOCUMENT HAS BEEN ELECTRONICALLY SIGNED BY EVIN BLOOM MD           Order Current Status    Fungus Culture - Body Fluid, Pleural Cavity In process    AFB Culture - Body Fluid, Pleural Cavity Preliminary result    AFB Culture - Body Fluid, Pleural Cavity Preliminary result    Anaerobic Culture - Body Fluid, Pleural Cavity Preliminary result    Anaerobic Culture - Pleural Fluid, Pleural Cavity Preliminary result    Fungus Culture - Body Fluid, Pleural Cavity Preliminary result        Discharge Details      Gee Whitney   Home Medication Instructions JAIRO:[id number]    Printed on:03/14/18 1007   Medication Information                      albuterol (PROVENTIL HFA;VENTOLIN HFA) 108 (90 Base) MCG/ACT inhaler  Inhale 2 puffs Every 6 (Six) Hours As Needed for Wheezing.             aspirin  MG tablet  Take 325 mg by mouth Daily.             atorvastatin (LIPITOR) 20 MG tablet  Take 20 mg by mouth Daily.             budesonide-formoterol (SYMBICORT) 160-4.5 MCG/ACT inhaler  Inhale 2 puffs 2 (Two) Times a Day.             cholecalciferol (VITAMIN D3) 1000 UNITS tablet  Take 1,000 Units by mouth Daily.             Docusate Calcium (STOOL SOFTENER PO)  Take  by mouth Daily.             ezetimibe (ZETIA) 10 MG tablet  Take 10 mg by mouth Daily.             levoFLOXacin (LEVAQUIN) 750 MG tablet  Take 1 tablet by mouth Daily for 2 doses.             levothyroxine (SYNTHROID, LEVOTHROID) 75 MCG tablet  Take 75 mcg by mouth Daily.             lisinopril (PRINIVIL,ZESTRIL) 20 MG tablet  Take 20 mg by mouth Daily.             Multiple Vitamins-Minerals (MULTIVITAMIN ADULT PO)  Take  by mouth Daily.             Omega-3 Fatty Acids (FISH OIL) 1200 MG capsule delayed-release  Take 1,200 mg by mouth Daily.               Discharge Disposition:  Home or Self Care    Discharge Diet:  Diet Instructions     Diet: Regular, Cardiac        Discharge Diet:   Regular  Cardiac           Discharge Activity:   Activity Instructions     Activity as Tolerated           Special Instructions:    Future Appointments  Date Time Provider Department Center   3/11/2019 9:00 AM Shaun Joseph MD [facility] None       Additional Instructions for the Follow-ups that You Need to Schedule     Discharge Follow-up with PCP    As directed      Follow Up Details:  within 1 week for hospital follow up               Time Spent on Discharge:  40 minutes    Electronically signed by GAURI Parrish, 03/14/18, 9:59 AM.

## 2018-03-14 NOTE — PROGRESS NOTES
Cardiothoracic Surgery Progress Note      PPD # 4 s/p Right tube thoracostomy    Chief complaint: Chest tube pain     LOS: 7 days      Subjective:  Mild pain at chest tube site.  No other complaints.      Objective:  Vital Signs  Temp:  [97.3 °F (36.3 °C)-99 °F (37.2 °C)] 97.3 °F (36.3 °C)  Heart Rate:  [] 77  Resp:  [16-18] 16  BP: (142-156)/(84-86) 156/84    Physical Exam:   General Appearance: alert, appears stated age and cooperative   Lungs: clear to auscultation, respirations regular, respirations even and respirations unlabored   Heart: regular rhythm & normal rate, normal S1, S2 and no murmur, no era, no rub   Chest tube: gone.     Results:    Results from last 7 days  Lab Units 03/12/18  0828   WBC 10*3/mm3 14.55*   HEMOGLOBIN g/dL 12.9*   HEMATOCRIT % 39.5   PLATELETS 10*3/mm3 247       Results from last 7 days  Lab Units 03/12/18  0828   SODIUM mmol/L 138   POTASSIUM mmol/L 4.2   CHLORIDE mmol/L 103   CO2 mmol/L 28.0   BUN mg/dL 23   CREATININE mg/dL 1.20   GLUCOSE mg/dL 138*   CALCIUM mg/dL 9.5         Assessment:  Right hemothorax  Final Cytology-Negative for malignancy.  Scattered benign mesothelial cells with a sanguineous background.  CXR looks good from this am  Plan:    Pulmonary toilet  Home anytime from our standpoint  We will see again as needed  ALEKSANDAR Quezada  03/14/18  8:20 AM

## 2018-03-14 NOTE — PLAN OF CARE
Problem: Patient Care Overview (Adult)  Goal: Plan of Care Review  Outcome: Ongoing (interventions implemented as appropriate)   03/14/18 0316   Coping/Psychosocial Response Interventions   Plan Of Care Reviewed With patient   Patient Care Overview   Progress improving     Goal: Adult Individualization and Mutuality  Outcome: Ongoing (interventions implemented as appropriate)   03/09/18 0346 03/14/18 0316   Individualization   Patient Specific Preferences door shut, lights off, prefers jogging shorts and gown --    Patient Specific Goals go home --    Patient Specific Interventions --  cardiac monitoring, monitor resp status, montior chest tube insertion site for bleeding     Goal: Discharge Needs Assessment  Outcome: Ongoing (interventions implemented as appropriate)   03/14/18 0316   Discharge Needs Assessment   Concerns To Be Addressed denies needs/concerns at this time   Readmission Within The Last 30 Days no previous admission in last 30 days   Discharge Facility/Level Of Care Needs other (see comments)  (home)   Discharge Disposition still a patient;home or self-care   Current Health   Anticipated Changes Related to Illness none   Self-Care   Equipment Currently Used at Home none   Discharge Needs Assessment   Equipment Needed After Discharge none   Current Discharge Risk other (see comments)  (none)   Living Environment   Transportation Available family or friend will provide;car       Problem: Pneumonia (Adult)  Goal: Signs and Symptoms of Listed Potential Problems Will be Absent or Manageable (Pneumonia)  Outcome: Outcome(s) achieved Date Met: 03/14/18 03/14/18 0316   Pneumonia   Problems Assessed (Pneumonia) all   Problems Present (Pneumonia) none       Problem: Infection, Risk/Actual (Adult)  Goal: Infection Prevention/Resolution  Outcome: Ongoing (interventions implemented as appropriate)   03/14/18 0316   Infection, Risk/Actual (Adult)   Infection Prevention/Resolution making progress toward outcome

## 2018-03-14 NOTE — PLAN OF CARE
Problem: Patient Care Overview (Adult)  Goal: Plan of Care Review  Outcome: Outcome(s) achieved Date Met: 03/14/18 03/13/18 0415 03/14/18 1359   Coping/Psychosocial Response Interventions   Plan Of Care Reviewed With --  patient;spouse   Patient Care Overview   Progress --  improving   Outcome Evaluation   Outcome Summary/Follow up Plan No c/o pain or nausea, chest tube remains in place. VSS, pt sleeping soundly. Will continue to monitor.  --      Goal: Adult Individualization and Mutuality  Outcome: Outcome(s) achieved Date Met: 03/14/18 03/09/18 0346 03/14/18 0316   Individualization   Patient Specific Preferences door shut, lights off, prefers jogging shorts and gown --    Patient Specific Goals go home --    Patient Specific Interventions --  cardiac monitoring, monitor resp status, montior chest tube insertion site for bleeding     Goal: Discharge Needs Assessment  Outcome: Outcome(s) achieved Date Met: 03/14/18 03/14/18 1359   Discharge Needs Assessment   Concerns To Be Addressed no discharge needs identified;denies needs/concerns at this time   Readmission Within The Last 30 Days no previous admission in last 30 days   Discharge Disposition home or self-care   Current Health   Anticipated Changes Related to Illness none   Self-Care   Equipment Currently Used at Home none   Discharge Needs Assessment   Equipment Needed After Discharge none   Living Environment   Transportation Available car;family or friend will provide       Problem: Infection, Risk/Actual (Adult)  Goal: Infection Prevention/Resolution  Outcome: Outcome(s) achieved Date Met: 03/14/18 03/14/18 1359   Infection, Risk/Actual (Adult)   Infection Prevention/Resolution achieves outcome       Problem: Patient Care Overview  Goal: Plan of Care Review  Outcome: Outcome(s) achieved Date Met: 03/14/18 03/14/18 1359   Coping/Psychosocial   Plan of Care Reviewed With patient;spouse   Plan of Care Review   Progress improving     Goal:  Individualization and Mutuality  Outcome: Outcome(s) achieved Date Met: 03/14/18    Goal: Discharge Needs Assessment  Outcome: Outcome(s) achieved Date Met: 03/14/18 03/12/18 1500 03/14/18 1359   Discharge Needs Assessment   Readmission Within the Last 30 Days --  no previous admission in last 30 days   Concerns to be Addressed --  no discharge needs identified;denies needs/concerns at this time   Patient/Family Anticipates Transition to --  home with family   Patient/Family Anticipated Services at Transition --  none   Transportation Concerns --  car, none   Transportation Anticipated family or friend will provide --    Anticipated Changes Related to Illness --  none   Equipment Needed After Discharge --  none   Disability   Equipment Currently Used at Home --  none     Goal: Interprofessional Rounds/Family Conf  Outcome: Outcome(s) achieved Date Met: 03/14/18

## 2018-03-15 LAB — BACTERIA SPEC ANAEROBE CULT: NORMAL

## 2018-03-16 LAB — BACTERIA SPEC ANAEROBE CULT: NORMAL

## 2018-03-26 ENCOUNTER — OFFICE VISIT (OUTPATIENT)
Dept: PULMONOLOGY | Facility: CLINIC | Age: 68
End: 2018-03-26

## 2018-03-26 VITALS
OXYGEN SATURATION: 96 % | BODY MASS INDEX: 24.34 KG/M2 | SYSTOLIC BLOOD PRESSURE: 130 MMHG | TEMPERATURE: 98.6 F | HEART RATE: 84 BPM | DIASTOLIC BLOOD PRESSURE: 74 MMHG | HEIGHT: 70 IN | WEIGHT: 170 LBS

## 2018-03-26 DIAGNOSIS — J94.2 HEMOTHORAX ON RIGHT: ICD-10-CM

## 2018-03-26 DIAGNOSIS — J18.9 COMMUNITY ACQUIRED PNEUMONIA OF RIGHT LOWER LOBE OF LUNG: Primary | ICD-10-CM

## 2018-03-26 PROCEDURE — 99213 OFFICE O/P EST LOW 20 MIN: CPT | Performed by: NURSE PRACTITIONER

## 2018-03-26 RX ORDER — OMEPRAZOLE 20 MG/1
20 CAPSULE, DELAYED RELEASE ORAL DAILY
COMMUNITY
Start: 2018-03-06 | End: 2019-03-11

## 2018-03-26 RX ORDER — NITROGLYCERIN 0.4 MG/1
0.4 TABLET SUBLINGUAL
COMMUNITY
Start: 2018-03-01

## 2018-03-26 RX ORDER — TRAZODONE HYDROCHLORIDE 50 MG/1
TABLET ORAL
COMMUNITY
Start: 2018-03-01 | End: 2019-03-11

## 2018-03-26 NOTE — PROGRESS NOTES
[facility] Pulmonary Follow up    CHIEF COMPLAINT    Hospital follow-up    HISTORY OF PRESENT ILLNESS    Gee Whitney is a 67 y.o.male here today for posthospitalization follow-up.  He is in hospital from March 7 to March 14 with a right lower lobe pneumonia as well as right-sided hemothorax with chest tube placement.  He improved on antibiotics steroids as well as the chest tube drainage.  He did have Pseudomonas growing in the sputum and continued on a course of Levaquin.  Today he comes in for follow-up.  We reviewed his chest x-ray, there is no sign of any infiltrates or effusions.  Overall he is doing quite well.  He has no cough or sputum production.  No pleuritic type chest pain.  No shortness of air.  No fevers or chills.  He does complain of some generalized weakness since his hospital stay, but overall is improving.  He does have chronic postnasal drainage.    He's been using his Symbicort twice daily.  He completed his antibiotics.        Patient Active Problem List   Diagnosis   • Coronary artery disease   • Hypertension   • Dyslipidemia   • Hypothyroidism   • Community acquired pneumonia of right lower lobe of lung       No Known Allergies    Current Outpatient Prescriptions:   •  albuterol (PROVENTIL HFA;VENTOLIN HFA) 108 (90 Base) MCG/ACT inhaler, Inhale 2 puffs Every 6 (Six) Hours As Needed for Wheezing., Disp: 1 inhaler, Rfl: 0  •  aspirin  MG tablet, Take 325 mg by mouth Daily., Disp: , Rfl:   •  atorvastatin (LIPITOR) 20 MG tablet, Take 20 mg by mouth Daily., Disp: , Rfl:   •  budesonide-formoterol (SYMBICORT) 160-4.5 MCG/ACT inhaler, Inhale 2 puffs 2 (Two) Times a Day., Disp: 1 inhaler, Rfl: 0  •  cholecalciferol (VITAMIN D3) 1000 UNITS tablet, Take 1,000 Units by mouth Daily., Disp: , Rfl:   •  Docusate Calcium (STOOL SOFTENER PO), Take  by mouth Daily., Disp: , Rfl:   •  ezetimibe (ZETIA) 10 MG tablet, Take 10 mg by mouth Daily., Disp: , Rfl:   •  levothyroxine (SYNTHROID, LEVOTHROID) 75  "MCG tablet, Take 75 mcg by mouth Daily., Disp: , Rfl:   •  lisinopril (PRINIVIL,ZESTRIL) 20 MG tablet, Take 20 mg by mouth Daily., Disp: , Rfl:   •  Multiple Vitamins-Minerals (MULTIVITAMIN ADULT PO), Take  by mouth Daily., Disp: , Rfl:   •  nitroglycerin (NITROSTAT) 0.4 MG SL tablet, , Disp: , Rfl:   •  Omega-3 Fatty Acids (FISH OIL) 1200 MG capsule delayed-release, Take 1,200 mg by mouth Daily., Disp: , Rfl:   •  omeprazole (priLOSEC) 20 MG capsule, Take 20 mg by mouth Daily., Disp: , Rfl:   •  traZODone (DESYREL) 50 MG tablet, , Disp: , Rfl:   MEDICATION LIST AND ALLERGIES REVIEWED.    Social History   Substance Use Topics   • Smoking status: Former Smoker     Packs/day: 1.00     Years: 10.00     Types: Cigarettes     Quit date: 1977   • Smokeless tobacco: Never Used   • Alcohol use Yes      Comment: occassionally        FAMILY AND SOCIAL HISTORY REVIEWED.    Review of Systems   Constitutional: Positive for fatigue. Negative for chills, fever and unexpected weight change.   HENT: Positive for congestion and postnasal drip. Negative for nosebleeds, rhinorrhea, sinus pressure and trouble swallowing.    Respiratory: Negative for cough, chest tightness, shortness of breath and wheezing.    Cardiovascular: Negative for chest pain and leg swelling.   Gastrointestinal: Negative for abdominal pain, constipation, diarrhea, nausea and vomiting.   Genitourinary: Negative for dysuria, frequency, hematuria and urgency.   Musculoskeletal: Negative for myalgias.   Neurological: Negative for dizziness, weakness, numbness and headaches.   All other systems reviewed and are negative.  .    /74 (BP Location: Right arm, Patient Position: Sitting, Cuff Size: Adult)   Pulse 84   Temp 98.6 °F (37 °C)   Ht 177.8 cm (70\")   Wt 77.1 kg (170 lb)   SpO2 96%   BMI 24.39 kg/m²     Immunization History   Administered Date(s) Administered   • Flu Vaccine High Dose PF 65YR+ 10/20/2017       Physical Exam   Constitutional: He is " oriented to person, place, and time. He appears well-developed and well-nourished.   HENT:   Head: Normocephalic and atraumatic.   Eyes: EOM are normal. Pupils are equal, round, and reactive to light.   Neck: Normal range of motion. Neck supple.   Cardiovascular: Normal rate and regular rhythm.    No murmur heard.  Pulmonary/Chest: Effort normal and breath sounds normal. No respiratory distress. He has no wheezes. He has no rales.           Abdominal: Soft. Bowel sounds are normal. He exhibits no distension.   Musculoskeletal: Normal range of motion. He exhibits no edema.   Neurological: He is alert and oriented to person, place, and time.   Skin: Skin is warm and dry. No erythema.   Psychiatric: He has a normal mood and affect. His behavior is normal.   Vitals reviewed.        RESULTS    PA and lateral chest x-ray done in the office today, reviewed by me  Clear costophrenic angles, no infiltrates or effusions.    PROBLEM LIST    Problem List Items Addressed This Visit        Respiratory    Community acquired pneumonia of right lower lobe of lung - Primary    Relevant Orders    XR Chest PA & Lateral    RESOLVED: Hemothorax on right      Other Visit Diagnoses    None.           DISCUSSION    He has done well following his hospital stay.  He is back to baseline with some chronic postnasal drainage.  He does have some generalized fatigue but is improving.  He is tolerating exercise well and eating a regular diet.    His chest x-ray has no infiltrates or effusions.    Follow-up as needed    I spent 15 minutes with the patient and family. I spent > 50% percent of this time counseling and discussing diagnostic testing and current status.    GAURI Gracia  03/26/20181:06 PM  Electronically signed     Please note that portions of this note were completed with a voice recognition program. Efforts were made to edit the dictations, but occasionally words are mistranscribed.      CC: Vicki Perry MD

## 2018-04-17 ENCOUNTER — OFFICE VISIT (OUTPATIENT)
Dept: CARDIAC SURGERY | Facility: CLINIC | Age: 68
End: 2018-04-17

## 2018-04-17 VITALS
DIASTOLIC BLOOD PRESSURE: 82 MMHG | SYSTOLIC BLOOD PRESSURE: 144 MMHG | TEMPERATURE: 98.4 F | BODY MASS INDEX: 24.2 KG/M2 | OXYGEN SATURATION: 97 % | WEIGHT: 169 LBS | HEIGHT: 70 IN | HEART RATE: 63 BPM

## 2018-04-17 DIAGNOSIS — J94.2 HEMOTHORAX: Primary | ICD-10-CM

## 2018-04-17 PROCEDURE — 71046 X-RAY EXAM CHEST 2 VIEWS: CPT | Performed by: THORACIC SURGERY (CARDIOTHORACIC VASCULAR SURGERY)

## 2018-04-17 PROCEDURE — 99213 OFFICE O/P EST LOW 20 MIN: CPT | Performed by: THORACIC SURGERY (CARDIOTHORACIC VASCULAR SURGERY)

## 2018-04-19 LAB
FUNGUS WND CULT: NORMAL
MYCOBACTERIUM SPEC CULT: NORMAL
NIGHT BLUE STAIN TISS: NORMAL

## 2018-04-20 ENCOUNTER — HOSPITAL ENCOUNTER (OUTPATIENT)
Dept: CT IMAGING | Facility: HOSPITAL | Age: 68
Discharge: HOME | End: 2018-04-20
Attending: THORACIC SURGERY (CARDIOTHORACIC VASCULAR SURGERY) | Admitting: THORACIC SURGERY (CARDIOTHORACIC VASCULAR SURGERY)

## 2018-04-20 LAB
FUNGUS WND CULT: NORMAL
MYCOBACTERIUM SPEC CULT: NORMAL
NIGHT BLUE STAIN TISS: NORMAL

## 2018-04-20 PROCEDURE — 25010000002 IOPAMIDOL 61 % SOLUTION: Performed by: THORACIC SURGERY (CARDIOTHORACIC VASCULAR SURGERY)

## 2018-04-20 PROCEDURE — 71270 CT THORAX DX C-/C+: CPT

## 2018-04-20 RX ADMIN — IOPAMIDOL 85 ML: 612 INJECTION, SOLUTION INTRAVENOUS at 10:15

## 2019-03-11 ENCOUNTER — OFFICE VISIT (OUTPATIENT)
Dept: CARDIOLOGY | Facility: CLINIC | Age: 69
End: 2019-03-11

## 2019-03-11 VITALS
HEART RATE: 70 BPM | WEIGHT: 173.14 LBS | OXYGEN SATURATION: 97 % | SYSTOLIC BLOOD PRESSURE: 144 MMHG | HEIGHT: 70 IN | BODY MASS INDEX: 24.79 KG/M2 | DIASTOLIC BLOOD PRESSURE: 84 MMHG

## 2019-03-11 DIAGNOSIS — I25.10 CORONARY ARTERY DISEASE INVOLVING NATIVE CORONARY ARTERY OF NATIVE HEART WITHOUT ANGINA PECTORIS: Primary | ICD-10-CM

## 2019-03-11 DIAGNOSIS — E78.5 DYSLIPIDEMIA: ICD-10-CM

## 2019-03-11 DIAGNOSIS — I10 ESSENTIAL HYPERTENSION: ICD-10-CM

## 2019-03-11 PROCEDURE — 99213 OFFICE O/P EST LOW 20 MIN: CPT | Performed by: INTERNAL MEDICINE

## 2019-03-11 RX ORDER — LISINOPRIL 30 MG/1
30 TABLET ORAL DAILY
COMMUNITY
End: 2022-05-09 | Stop reason: SDDI

## 2019-03-11 NOTE — PROGRESS NOTES
Subjective:     Encounter Date:03/11/2019      Patient ID: Gee Whitney is a 68 y.o. male.    Chief Complaint: Coronary Artery Disease and Hypertension      PROBLEM LIST:  1. Coronary artery disease:  a. [facility] for MI, 1977, Dr. Carlos Gilmore: Occluded LAD with reported collaterals per patient report, normal LVEF.  b. MPS, 2014: EF 50%, anterior apical scar. No ischemia.  c. US aorta, 04/27/2016: No abdominal aortic aneurysm.  2. Hypertension.   3. Dyslipidemia.  4. History of elevated LFTs.   5. Traumatic hemothorax 2018  6. Renal cyst.  7. Hypothyroidism.  8. Bilateral cataract surgery.    9. Remote tobacco abuse.    History of Present Illness  Gee Whitney returns today for annual follow up with a history of coronary artery disease, hypertension, and dyslipidemia. Since last visit, he was hospitalized in March 2018 for a right-sided hemothorax. He has now fully recovered, and has been doing well from a cardiovascular standpoint. Denies any exertional chest pain, shortness of breath, orthopnea, PND, or palpitations.    No Known Allergies      Current Outpatient Medications:   •  aspirin  MG tablet, Take 325 mg by mouth Daily., Disp: , Rfl:   •  atorvastatin (LIPITOR) 20 MG tablet, Take 20 mg by mouth Daily., Disp: , Rfl:   •  cholecalciferol (VITAMIN D3) 1000 UNITS tablet, Take 1,000 Units by mouth Daily., Disp: , Rfl:   •  Docusate Calcium (STOOL SOFTENER PO), Take 1 tablet by mouth Daily., Disp: , Rfl:   •  ezetimibe (ZETIA) 10 MG tablet, Take 10 mg by mouth Daily., Disp: , Rfl:   •  levothyroxine (SYNTHROID, LEVOTHROID) 75 MCG tablet, Take 75 mcg by mouth Daily., Disp: , Rfl:   •  lisinopril (PRINIVIL,ZESTRIL) 30 MG tablet, Take 30 mg by mouth Daily., Disp: , Rfl:   •  Multiple Vitamins-Minerals (MULTIVITAMIN ADULT PO), Take 1 tablet by mouth Daily., Disp: , Rfl:   •  nitroglycerin (NITROSTAT) 0.4 MG SL tablet, Place 0.4 mg under the tongue Every 5 (Five) Minutes As Needed., Disp: , Rfl:   •   "Omega-3 Fatty Acids (FISH OIL) 1200 MG capsule delayed-release, Take 1,200 mg by mouth Daily., Disp: , Rfl:     The following portions of the patient's history were reviewed and updated as appropriate: allergies, current medications, past family history, past medical history, past social history, past surgical history and problem list.    Review of Systems   Constitution: Negative.   Cardiovascular: Negative for chest pain, irregular heartbeat, leg swelling, palpitations and syncope.   Respiratory: Negative.    Hematologic/Lymphatic: Negative for bleeding problem. Does not bruise/bleed easily.   Skin: Negative for rash.   Musculoskeletal: Negative for muscle weakness and myalgias.   Gastrointestinal: Negative for heartburn, nausea and vomiting.   Neurological: Negative.           Objective:     Vitals:    03/11/19 0914   BP: 144/84   BP Location: Left arm   Patient Position: Sitting   Pulse: 70   SpO2: 97%   Weight: 78.5 kg (173 lb 2.2 oz)   Height: 177.8 cm (70\")         Physical Exam   Constitutional: He is oriented to person, place, and time. He appears well-developed and well-nourished.   HENT:   Mouth/Throat: Oropharynx is clear and moist.   Neck: No JVD present. Carotid bruit is not present. No thyromegaly present.   Cardiovascular: Regular rhythm, S1 normal, S2 normal, normal heart sounds and intact distal pulses. Exam reveals no gallop, no S3 and no S4.   No murmur heard.  Pulses:       Carotid pulses are 2+ on the right side, and 2+ on the left side.       Radial pulses are 2+ on the right side, and 2+ on the left side.   Pulmonary/Chest: Breath sounds normal.   Abdominal: Soft. Bowel sounds are normal. He exhibits no mass. There is no tenderness.   Musculoskeletal: He exhibits no edema.   Neurological: He is alert and oriented to person, place, and time.   Skin: Skin is warm and dry. No rash noted.       Lab Review:  Lab Results   Component Value Date    GLUCOSE 138 (H) 03/12/2018    BUN 23 03/12/2018    " CREATININE 1.20 03/12/2018    EGFRIFNONA 60 (L) 03/12/2018    BCR 19.2 03/12/2018    K 4.2 03/12/2018    CO2 28.0 03/12/2018    CALCIUM 9.5 03/12/2018    PROTENTOTREF 6.9 02/23/2018    ALBUMIN 4.00 03/12/2018    AST 27 03/07/2018    ALT 24 03/07/2018     Lab Results   Component Value Date    CHLPL 146 02/23/2018    TRIG 79 02/23/2018    HDL 51 02/23/2018    LDL 79 02/23/2018      Lab Results   Component Value Date    WBC 14.55 (H) 03/12/2018    HGB 12.9 (L) 03/12/2018    HCT 39.5 03/12/2018    MCV 91.6 03/12/2018     03/12/2018     Procedures        Assessment:   Gee was seen today for coronary artery disease and hypertension.    Diagnoses and all orders for this visit:    Coronary artery disease involving native coronary artery of native heart without angina pectoris    Essential hypertension    Dyslipidemia        Impression:  1. Coronary artery disease: stable without angina. Last stress test was five years ago.  2. Hypertension, borderline control.  3. Dyslipidemia, well-controlled on atorvastatin 20 mg and Zetia 10 mg.    Plan:  1. Schedule stress test for follow-up of CAD.  2. Stable cardiac status.  3. Continue current medications.  4. Revisit in 12 MO, or sooner as needed.    Scribed for Shaun Joseph MD by Mary Ellen Berry. 3/11/2019  9:58 AM    I, Shaun Joseph MD, personally performed the services described in this documentation as scribed by the above individual in my presence, and it is both accurate and complete      Please note that portions of this note may have been completed with a voice recognition program. Efforts were made to edit the dictations, but occasionally words are mistranscribed.

## 2019-04-01 ENCOUNTER — HOSPITAL ENCOUNTER (OUTPATIENT)
Dept: CARDIOLOGY | Facility: HOSPITAL | Age: 69
Discharge: HOME | End: 2019-04-01

## 2019-04-01 DIAGNOSIS — I25.10 CORONARY ARTERY DISEASE INVOLVING NATIVE CORONARY ARTERY OF NATIVE HEART WITHOUT ANGINA PECTORIS: ICD-10-CM

## 2019-04-01 PROCEDURE — A9500 TC99M SESTAMIBI: HCPCS | Performed by: INTERNAL MEDICINE

## 2019-04-01 PROCEDURE — 93017 CV STRESS TEST TRACING ONLY: CPT

## 2019-04-01 PROCEDURE — 78452 HT MUSCLE IMAGE SPECT MULT: CPT | Performed by: INTERNAL MEDICINE

## 2019-04-01 PROCEDURE — 78452 HT MUSCLE IMAGE SPECT MULT: CPT

## 2019-04-01 PROCEDURE — 0 TECHNETIUM SESTAMIBI: Performed by: INTERNAL MEDICINE

## 2019-04-01 PROCEDURE — 93018 CV STRESS TEST I&R ONLY: CPT | Performed by: INTERNAL MEDICINE

## 2019-04-01 RX ADMIN — TECHNETIUM TC 99M SESTAMIBI 1 DOSE: 1 INJECTION INTRAVENOUS at 12:55

## 2019-04-01 RX ADMIN — TECHNETIUM TC 99M SESTAMIBI 1 DOSE: 1 INJECTION INTRAVENOUS at 11:00

## 2019-04-02 LAB
BH CV STRESS BP STAGE 1: NORMAL
BH CV STRESS BP STAGE 2: NORMAL
BH CV STRESS BP STAGE 3: NORMAL
BH CV STRESS BP STAGE 4: NORMAL
BH CV STRESS DURATION MIN STAGE 1: 3
BH CV STRESS DURATION MIN STAGE 2: 3
BH CV STRESS DURATION MIN STAGE 3: 3
BH CV STRESS DURATION MIN STAGE 4: 1
BH CV STRESS DURATION SEC STAGE 1: 0
BH CV STRESS DURATION SEC STAGE 2: 0
BH CV STRESS DURATION SEC STAGE 3: 0
BH CV STRESS DURATION SEC STAGE 4: 0
BH CV STRESS GRADE STAGE 1: 10
BH CV STRESS GRADE STAGE 2: 12
BH CV STRESS GRADE STAGE 3: 14
BH CV STRESS GRADE STAGE 4: 16
BH CV STRESS HR STAGE 1: 100
BH CV STRESS HR STAGE 2: 113
BH CV STRESS HR STAGE 3: 130
BH CV STRESS HR STAGE 4: 137
BH CV STRESS METS STAGE 1: 5
BH CV STRESS METS STAGE 2: 7.5
BH CV STRESS METS STAGE 3: 10
BH CV STRESS METS STAGE 4: 13.5
BH CV STRESS PROTOCOL 1: NORMAL
BH CV STRESS RECOVERY BP: NORMAL MMHG
BH CV STRESS RECOVERY HR: 93 BPM
BH CV STRESS SPEED STAGE 1: 1.7
BH CV STRESS SPEED STAGE 2: 2.5
BH CV STRESS SPEED STAGE 3: 3.4
BH CV STRESS SPEED STAGE 4: 4.2
BH CV STRESS STAGE 1: 1
BH CV STRESS STAGE 2: 2
BH CV STRESS STAGE 3: 3
BH CV STRESS STAGE 4: 4
LV EF NUC BP: 40 %
MAXIMAL PREDICTED HEART RATE: 150 BPM
PERCENT MAX PREDICTED HR: 91.33 %
STRESS BASELINE BP: NORMAL MMHG
STRESS BASELINE HR: 64 BPM
STRESS PERCENT HR: 107 %
STRESS POST ESTIMATED WORKLOAD: 13.3 METS
STRESS POST EXERCISE DUR MIN: 10 MIN
STRESS POST EXERCISE DUR SEC: 0 SEC
STRESS POST PEAK BP: NORMAL MMHG
STRESS POST PEAK HR: 137 BPM
STRESS TARGET HR: 128 BPM

## 2019-04-04 ENCOUNTER — TELEPHONE (OUTPATIENT)
Dept: CARDIOLOGY | Facility: CLINIC | Age: 69
End: 2019-04-04

## 2019-04-04 NOTE — TELEPHONE ENCOUNTER
"----- Message from Shaun Joseph MD sent at 4/2/2019  2:58 PM EDT -----  No evidence of a \"new\" blockage.  Old heart attack noted as previous studies  "

## 2020-04-09 NOTE — PROGRESS NOTES
[facility] Cardiology  Subjective:     Encounter Date:04/10/2020      Patient ID: Gee Whitney is a  69 y.o. male.    Chief Complaint: Coronary Artery Disease      PROBLEM LIST:  1. Coronary artery disease:  a. [facility] for MI, 1977, Dr. Carlos Gilmore: Occluded LAD with reported collaterals per patient report, normal LVEF.  b. MPS, 2014: EF 50%, anterior apical scar. No ischemia.  c. US aorta, 04/27/2016: No abdominal aortic aneurysm.  d. Exercise MPS, 4/1/2019: prior infarction of the anterior apex, and anterior septum with no significant ischemia noted. EF 40%. Severe hypokinesis.  2. Hypertension.   3. Dyslipidemia.  4. History of elevated LFTs.   5. Traumatic hemothorax 2018  6. Renal cyst.  7. Hypothyroidism.  8. Bilateral cataract surgery.    9. Remote tobacco abuse.      History of Present Illness  Gee Whitney has a telephone visit today for a 1 year follow up with a history of CAD, and cardiac risk factors. Since last visit patient is doing very well.  He is still active walking the golf course, and walking outside.  He is taking prudent coronavirus precautions.  He has no chest pain shortness of breath orthopnea PND tachypalpitations.  Overall unchanged from last year.    No Known Allergies      Current Outpatient Medications:   •  aspirin  MG tablet, Take 325 mg by mouth Daily., Disp: , Rfl:   •  atorvastatin (LIPITOR) 20 MG tablet, Take 20 mg by mouth Daily., Disp: , Rfl:   •  cholecalciferol (VITAMIN D3) 1000 UNITS tablet, Take 1,000 Units by mouth Daily., Disp: , Rfl:   •  Docusate Calcium (STOOL SOFTENER PO), Take 1 tablet by mouth Daily., Disp: , Rfl:   •  ezetimibe (ZETIA) 10 MG tablet, Take 10 mg by mouth Daily., Disp: , Rfl:   •  levothyroxine (SYNTHROID, LEVOTHROID) 75 MCG tablet, Take 75 mcg by mouth Daily., Disp: , Rfl:   •  lisinopril (PRINIVIL,ZESTRIL) 30 MG tablet, Take 30 mg by mouth Daily., Disp: , Rfl:   •  Multiple Vitamins-Minerals  "(MULTIVITAMIN ADULT PO), Take 1 tablet by mouth Daily., Disp: , Rfl:   •  nitroglycerin (NITROSTAT) 0.4 MG SL tablet, Place 0.4 mg under the tongue Every 5 (Five) Minutes As Needed., Disp: , Rfl:   •  Omega-3 Fatty Acids (FISH OIL) 1200 MG capsule delayed-release, Take 1,200 mg by mouth Daily., Disp: , Rfl:     The following portions of the patient's history were reviewed and updated as appropriate: allergies, current medications, past family history, past medical history, past social history, past surgical history and problem list.    Review of Systems   Constitution: Negative.   Cardiovascular: Negative.    Respiratory: Negative.    Hematologic/Lymphatic: Negative for bleeding problem. Does not bruise/bleed easily.   Skin: Negative for rash.   Musculoskeletal: Negative for muscle weakness and myalgias.   Gastrointestinal: Negative for heartburn, nausea and vomiting.   Neurological: Negative.           Objective:     Vitals:    04/10/20 1256   BP: 134/70   BP Location: Left arm   Patient Position: Sitting   Pulse: 67   Weight: 77.1 kg (170 lb)   Height: 177.8 cm (70\")         Physical Exam   No physical exam performed secondary to telephone visit.      Lab Review:  2/14/2020  Lipid Panel:      HDL 46  LDL 66    Procedures        Assessment:   Gee was seen today for coronary artery disease.    Diagnoses and all orders for this visit:    Coronary artery disease involving native coronary artery of native heart without angina pectoris    Essential hypertension    Dyslipidemia        Plan:  1. Coronary artery disease, prior MI, without current ischemia by stress test last year.  Functional class I-2 without angina.  Continue current medical therapy  2. Hypertension, well controlled on lisinopril.  No changes.  3. Dyslipidemia on atorvastatin and Zetia, last LDL of 66.  We will continue  4. Continue current medications.  5. Revisit in 12 MO, or sooner as needed.    This patient has consented to a " telehealth visit via telephone. The visit was scheduled as a telephone visit to comply with patient safety concerns in accordance with CDC recommendations.  All vitals recorded within this visit are reported by the patient.  I spent 12 minutes in total including but not limited to the 7 minutes spent in direct conversation with this patient.       Shaun Joseph MD      Please note that portions of this note may have been completed with a voice recognition program. Efforts were made to edit the dictations, but occasionally words are mistranscribed.

## 2020-04-10 ENCOUNTER — OFFICE VISIT (OUTPATIENT)
Dept: CARDIOLOGY | Facility: CLINIC | Age: 70
End: 2020-04-10

## 2020-04-10 VITALS
WEIGHT: 170 LBS | HEART RATE: 67 BPM | BODY MASS INDEX: 24.34 KG/M2 | DIASTOLIC BLOOD PRESSURE: 70 MMHG | HEIGHT: 70 IN | SYSTOLIC BLOOD PRESSURE: 134 MMHG

## 2020-04-10 DIAGNOSIS — E78.5 DYSLIPIDEMIA: ICD-10-CM

## 2020-04-10 DIAGNOSIS — I25.10 CORONARY ARTERY DISEASE INVOLVING NATIVE CORONARY ARTERY OF NATIVE HEART WITHOUT ANGINA PECTORIS: Primary | ICD-10-CM

## 2020-04-10 DIAGNOSIS — I10 ESSENTIAL HYPERTENSION: ICD-10-CM

## 2020-04-10 PROCEDURE — 99441 PR PHYS/QHP TELEPHONE EVALUATION 5-10 MIN: CPT | Performed by: INTERNAL MEDICINE

## 2021-04-07 ENCOUNTER — PATIENT MESSAGE (OUTPATIENT)
Dept: CARDIOLOGY | Facility: CLINIC | Age: 71
End: 2021-04-07

## 2021-05-03 ENCOUNTER — OFFICE VISIT (OUTPATIENT)
Dept: CARDIOLOGY | Facility: CLINIC | Age: 71
End: 2021-05-03

## 2021-05-03 VITALS
DIASTOLIC BLOOD PRESSURE: 70 MMHG | HEIGHT: 70 IN | HEART RATE: 60 BPM | SYSTOLIC BLOOD PRESSURE: 130 MMHG | WEIGHT: 170 LBS | TEMPERATURE: 98.7 F | BODY MASS INDEX: 24.34 KG/M2

## 2021-05-03 DIAGNOSIS — I25.10 CORONARY ARTERY DISEASE INVOLVING NATIVE CORONARY ARTERY OF NATIVE HEART WITHOUT ANGINA PECTORIS: Primary | ICD-10-CM

## 2021-05-03 DIAGNOSIS — I10 ESSENTIAL HYPERTENSION: ICD-10-CM

## 2021-05-03 DIAGNOSIS — E78.5 DYSLIPIDEMIA: ICD-10-CM

## 2021-05-03 PROCEDURE — 99214 OFFICE O/P EST MOD 30 MIN: CPT | Performed by: INTERNAL MEDICINE

## 2021-08-09 ENCOUNTER — TRANSCRIBE ORDERS (OUTPATIENT)
Dept: ADMINISTRATIVE | Facility: HOSPITAL | Age: 71
End: 2021-08-09

## 2021-08-09 ENCOUNTER — HOSPITAL ENCOUNTER (OUTPATIENT)
Dept: GENERAL RADIOLOGY | Facility: HOSPITAL | Age: 71
Discharge: HOME | End: 2021-08-09
Admitting: FAMILY MEDICINE

## 2021-08-09 DIAGNOSIS — Z87.891 HISTORY OF SMOKING FOR MORE THAN 10 YEARS: Primary | ICD-10-CM

## 2021-08-09 PROCEDURE — 71046 X-RAY EXAM CHEST 2 VIEWS: CPT

## 2021-08-13 ENCOUNTER — TRANSCRIBE ORDERS (OUTPATIENT)
Dept: DIABETES SERVICES | Facility: HOSPITAL | Age: 71
End: 2021-08-13

## 2021-08-13 DIAGNOSIS — R73.01 IFG (IMPAIRED FASTING GLUCOSE): Primary | ICD-10-CM

## 2021-09-16 ENCOUNTER — HOSPITAL ENCOUNTER (OUTPATIENT)
Dept: NUTRITION | Facility: HOSPITAL | Age: 71
Setting detail: RECURRING SERIES
Discharge: HOME | End: 2021-09-16

## 2021-09-16 VITALS — WEIGHT: 167 LBS | BODY MASS INDEX: 24.73 KG/M2 | HEIGHT: 69 IN

## 2021-09-16 PROCEDURE — 97802 MEDICAL NUTRITION INDIV IN: CPT

## 2021-09-16 NOTE — CONSULTS
Adult Outpatient Nutrition  Assessment/PES    Patient Name:  Gee Whitney  YOB: 1950  MRN: [id number]    Assessment Date:  9/16/2021    Comments:  This medical referred consult was provided via telehealth as patient was unable to attend an in-office appointment today due to the COVID-19 crisis. Consent for treatment was given verbally.  RD spent a total of 60 minutes with patient today.     Patient is a 70 year old male here today for an initial nutrition counseling visit. Current anthropometrics: height; 175.3 cm (69 inches); weight; 75.8 kg (167 lb), please note weight is “stated” per pt as we are unable to obtain today as pt is utilizing telehealth services at this time. Patients BMI is 24.66 kg/m2. Patient was unable to complete the health literacy assessment prior to today's appointment. Patient does not disclose any barriers to learning at this time. They have no known food allergies or intolerances at this time. Patient has not disclosed the use of any food assistance programs today.     Today, pt would like to learn more about how to achieve consistent and stable blood sugars through diet. Patient and wife present for visit today via phone. He states that he has recently been diagnosed with pre diabetes. Patient states that his MD told him his A1C was 6.2% (this information is not available in EMR). Patient fasting glucose was impaired at 105 mg/dL. Patient consumes a fairly healthy diet at this time. Patient skips lunch frequently if he eats a larger or later breakfast in the mornings. Per pt 24 hr recall and through conversation, RD notes that pt does not consistently consume carbohydrates at his meal times. Patient has a history of CAD, HLD, HTN, and CKD 3. Pt wife states that they strive to follow low sodium and low fat for cardiac purposes. RD communicated to pt that there are 3 groups of foods which fall into our carbohydrate category that we need to pay closer attention to when  "eating due to their substantial carbohydrate content. We spoke about the importance of paying attention to serving sizes and portions of these food groups to ensure we reach our carbohydrate needs. We utilized the My Plate method to model an ideal plate for meal times. RD encouraged pt to consume foods from various food groups as frequently as possible to ensure he is providing his body with adequate macro and micronutrients. Pt and wife were able to utilize teach back method in building appropriate and realistic plates for meal times that they frequently consume at home.Estimate current nutritional needs to be: 1876 kcal per day (assessed by [place] St. Jeor equation, AF x 1.2/1.3 average). RD shared recipe resource www.diabetesfoodhub.org with pt as a meal planning tool he is encouraged to check out and utilize.    RD provided pt with [facility] Nutrition Basics booklet and supporting education materials today. A follow up has been scheduled for 10/20/2021 @ 10:30 AM. Thank you for this referral.    Goals:  1. Follow My Plate method at lunch and dinner daily   2. Aim for a consistent intake of meals daily: Breakfast, Lunch, Dinner and HS snack   3. Maintain Current weight     General Info     Row Name 09/16/21 1037       Today's Session    Person(s) attending today's session  Patient;Spouse     Services Used Today?  No       General Information    How Well Do You Speak English?  very well    Do You Speak a Language Other Than English at Home?  no    Preferred Language  English    Are you able to read and write English?  Yes    Lives With  spouse    Last grade of school completed  12th    Is patient pregnant?  n/a          Anthropometrics     Row Name 09/16/21 1042 09/16/21 1037       Anthropometrics    Height  175.3 cm (69\")  175.3 cm (69\")    Weight  --  75.8 kg (167 lb)       Ideal Body Weight (IBW)    Ideal Body Weight (IBW) (kg)  73.69  73.69    % Ideal Body Weight  --  102.8       Body Mass Index (BMI)    " BMI (kg/m2)  --  24.71        Nutritional Info/Activity     Row Name 09/16/21 1038       Nutritional Information    Have you had weight changes?  No    History of eating disorder?  No    Do you have difficulty chewing food?  No    Functional Status  able to prepare meals;able to purchase food;ambulatory    How often during the day do you find yourself snacking?  1    How often do you eat out and where?  5-6x per month; typically breakfast    Do you use Food Assistance programs (WIC, food stamps, food bank)?  no    Do you need information about Food Assistance programs?  no    How many times do you drink milk per day?  1    How many times do you eat fruit per day?  1    How many times do you eat vegetables per day?  1    How many times do you drink juice per day?  1    How many times do you eat candy/chocolates per day?  1    How many diet sodas do you drink per day?  1    How many times do you drink alcohol per day?  -- 1 out of 5    How many times do you have caffeine per day?  1    How many servings of artificial sweetner do you have per day?  1    How many meals do you eat each day?  -- 2-3    How many snacks do you eat each day?  1    What is the biggest challenge you have with your diet?  Knowledge    What type of support do you currently use to help you with your health issues?  wife    Enter everything you can remember eating in the last 24 hours (1 day)  B: 3 egg omelet, 2 pieces toast with jelly, potatoes; PM snack: watermelon; D: steak, green beans, corn, iced tea       Eating Environment    Eating environment  Family       Physical Activity    Are you currently involved in an activity/exercise program?   Yes    Describe physical activity  plays golf weekly and walks 1x per week    How would you rank exercise as an important health lifestyle practice?  9          Estimated/Assessed Needs     Row Name 09/16/21 1042 09/16/21 1037       Calculation Measurements    Weight Used For Calculations  75.8 kg (167  "lb)  --    Height  175.3 cm (69\")  175.3 cm (69\")       Estimated/Assessed Needs    Additional Documentation  [place]-St. Jeor Equation (Group)  --       S[place]cer-St. Jeor Equation    RMR (S[place]cer-St. Jeor Equation)  1497.885  --    S[place]cer-St. Jeor Activity Factors  1.4 - 1.5  --    Activity Factors ([place]-St. Jeor)  9180.667 - 1392.4488  --                Problem/Interventions:  Problem 1     Row Name 09/16/21 1044          Nutrition Diagnoses Problem 1    Problem 1  Altered Nutrition Related to Labs     Etiology (related to)  Medical Diagnosis     Endocrine  Pre-diabetes     Signs/Symptoms (evidenced by)  Biochemical     Labs Reviewed  Done     Specific Labs Noted  HgbA1C                 Intervention Goal     Row Name 09/16/21 1045          Intervention Goal    General  Provide information regarding MNT for treatment/condition;Improved nutrition related lab(s)     PO  Meet estimated needs     Weight  Maintain weight           Nutrition Prescription     Row Name 09/16/21 1046          Nutrition Prescription PO    PO Prescription  Begin/change diet     Begin/Change Diet to  Regular     Fluid Consistency  Thin     Common Modifiers  Consistent Carbohydrate;Cardiac     New PO Prescription Ordered?  No, recommended         Education/Evaluation     Row Name 09/16/21 1046          Education    Education  Provided education regarding     Provided education regarding  Preventative health;Nutrition related factor;Diet rationale;Key food habit change        Monitor/Evaluation    Monitor  Per protocol;PO intake;Pertinent labs     Education Follow-up  Reinforce PRN           Electronically signed by:  Marbella Sandhu RD  09/16/21 10:47 EDT  "

## 2021-10-20 ENCOUNTER — HOSPITAL ENCOUNTER (OUTPATIENT)
Dept: NUTRITION | Facility: HOSPITAL | Age: 71
Setting detail: RECURRING SERIES
Discharge: HOME | End: 2021-10-20

## 2021-10-20 VITALS — BODY MASS INDEX: 24.14 KG/M2 | HEIGHT: 69 IN | WEIGHT: 163 LBS

## 2021-10-20 NOTE — PROGRESS NOTES
Adult Outpatient Nutrition  Assessment/PES    Patient Name:  Gee Whitney  YOB: 1950  MRN: [id number]    Assessment Date:  10/20/2021    Comments:  This medical referred consult was provided via telehealth as patient was unable to attend an in-office appointment today due to the COVID-19 crisis. Consent for treatment was given verbally. RD spent a total of 30 minutes with patient today.     Pt reports today via telephone today for weight loss follow up, wife present as well. Patient states that he has been doing a good job. He states he is 'eating a lot'. He has increased his intake from 2 meals per day to 3 meals per day and 2 snacks per day. He is doing a good job of keeping with my plate method at meal times. His wife prepares all of his meals and she is very conscious. Patient BMR 1876; he seems to be meeting it/coming close per 24 hr recall:    B: eggs, biscuit, turkey sausage  L: Ham and Cheese sandwich; cottage cheese and pickles  PM snack: apple and PB  D: [place] w/vegetable and sweet potato  HS: ice cream sandwich     He has lost 4-5 lb and he did not want to lose weight. Wife is concerned with weight loss. RD communicated to patient and wife that fluid could be a cause with weight fluctuation 2/2 to CKD 3 dx. Patient intake should not be allowing for weight loss. RD encouraged pt to continue to monitor this, but given the small amount of loss and short time period, not a major concern at this time. Pt is motivated to continue with changes. Having blood work drawn 11/8; hopeful for positive results with diet change. RD expects good continued adherence. Encouraged pt to reach out should he have any questions.       Goals:  1. Follow My Plate method at lunch and dinner daily-100% met   2. Aim for a consistent intake of meals daily: Breakfast, Lunch, Dinner and HS snack -100% met   3. Maintain Current weight -50% met (fluid likely cause of fluctuations)     Anthropometrics     Row Name  "10/20/21 1050          Anthropometrics    Height 175.3 cm (69\")     Weight 73.9 kg (163 lb)            Ideal Body Weight (IBW)    Ideal Body Weight (IBW) (kg) 73.69     % Ideal Body Weight 100.34            Body Mass Index (BMI)    BMI (kg/m2) 24.12                 Electronically signed by:  Marbella Sandhu RD  10/20/21 10:51 EDT  "

## 2022-01-13 ENCOUNTER — TRANSCRIBE ORDERS (OUTPATIENT)
Dept: ADMINISTRATIVE | Facility: HOSPITAL | Age: 72
End: 2022-01-13

## 2022-01-13 ENCOUNTER — HOSPITAL ENCOUNTER (OUTPATIENT)
Dept: GENERAL RADIOLOGY | Facility: HOSPITAL | Age: 72
Discharge: HOME | End: 2022-01-13
Admitting: FAMILY MEDICINE

## 2022-01-13 DIAGNOSIS — J43.2 CENTRILOBULAR EMPHYSEMA: Primary | ICD-10-CM

## 2022-01-13 PROCEDURE — 71046 X-RAY EXAM CHEST 2 VIEWS: CPT

## 2022-05-06 NOTE — PROGRESS NOTES
[facility] Medicine Services  PROGRESS NOTE    Patient Name: Gee Whitney  : 1950  MRN: [id number]    Date of Admission: 3/7/2018  Length of Stay: 4  Primary Care Physician: Vicki Perry MD    Subjective   Subjective     CC:  FU dyspnea    HPI:  Wife at bedside. Cough better on a whole. Ambulated this AM. Lidoderm patch helps with the abdominal pain when he coughs    Review of Systems  Gen- No fevers, chills  CV- No chest pain, palpitations  Resp- +Cough, no dyspnea  GI- No N/V/D, abd pain    Otherwise ROS is negative except as mentioned in the HPI.    Objective   Objective     Vital Signs:   Temp:  [97.4 °F (36.3 °C)-98.7 °F (37.1 °C)] 97.5 °F (36.4 °C)  Heart Rate:  [] 91  Resp:  [16-20] 18  BP: (147-162)/(76-81) 147/76        Physical Exam:  Constitutional: No acute distress, awake, alert on RA in chair  Eyes: PERRLA, sclerae anicteric, no conjunctival injection  HENT: NCAT, mucous membranes moist  Neck: Supple, no thyromegaly, no lymphadenopathy, trachea midline  Respiratory: Coarse BS diffusely, nonlabored respirations, Chest tube on right   Cardiovascular: RRR, no murmurs, rubs, or gallops, palpable pedal pulses bilaterally  Gastrointestinal: Positive bowel sounds, soft, nontender, nondistended  Musculoskeletal: No bilateral ankle edema, no clubbing or cyanosis to extremities, small tender bruise on L side anteriorly  Psychiatric: Appropriate affect, cooperative  Neurologic: Oriented x 3, strength symmetric in all extremities, Cranial Nerves grossly intact to confrontation, speech clear  Skin: No rashes    Results Reviewed:  I have personally reviewed current lab, radiology, and data and agree.      Results from last 7 days  Lab Units 18  0659 03/10/18  0404 18  1201 18  0450   WBC 10*3/mm3 15.39* 15.26*  --  17.64*   HEMOGLOBIN g/dL 12.1* 12.7*  --  12.1*   HEMATOCRIT % 37.2* 38.2*  --  36.8*   PLATELETS 10*3/mm3 224 223  --  187   INR   --    --  0.98  --        Results from last 7 days  Lab Units 03/11/18  0659 03/10/18  0404 03/08/18  0449 03/07/18  0354   SODIUM mmol/L 139 135 137 141   POTASSIUM mmol/L 4.5 4.6 4.5 4.2   CHLORIDE mmol/L 109 105 109 112*   CO2 mmol/L 24.0 22.0 20.0 25.0   BUN mg/dL 23 24* 23 32*   CREATININE mg/dL 1.10 1.00 1.00 1.20   GLUCOSE mg/dL 96 122* 121* 120*   CALCIUM mg/dL 9.1 9.1 8.7 8.5*   ALT (SGPT) U/L  --   --   --  24   AST (SGOT) U/L  --   --   --  27     No results found for: BNP  No results found for: PHART    Microbiology Results Abnormal     Procedure Component Value - Date/Time    Body Fluid Culture - Body Fluid, Pleural Cavity [[id number]]  (Normal) Collected:  03/08/18 1408    Lab Status:  Preliminary result Specimen:  Body Fluid from Pleural Cavity Updated:  03/11/18 0811     BF Culture No growth at 3 days     Gram Stain Result Occasional WBCs seen      No organisms seen    Body Fluid Culture - Body Fluid, Pleural Cavity [[id number]]  (Normal) Collected:  03/09/18 1000    Lab Status:  Preliminary result Specimen:  Body Fluid from Pleural Cavity Updated:  03/11/18 0811     BF Culture No growth at 2 days     Gram Stain Result Many (4+) Red blood cells      Rare (1+) WBCs seen      No organisms seen    Blood Culture - Blood, [[id number]]  (Normal) Collected:  03/07/18 0445    Lab Status:  Preliminary result Specimen:  Blood from Arm, Right Updated:  03/11/18 0616     Blood Culture No growth at 4 days    Blood Culture - Blood, [[id number]]  (Normal) Collected:  03/07/18 0503    Lab Status:  Preliminary result Specimen:  Blood from Arm, Left Updated:  03/11/18 0616     Blood Culture No growth at 4 days    AFB Culture - Body Fluid, Pleural Cavity [[id number]] Collected:  03/09/18 1000    Lab Status:  Preliminary result Specimen:  Body Fluid from Pleural Cavity Updated:  03/10/18 1358     AFB Stain No acid fast bacilli seen on concentrated smear    Respiratory Culture - Sputum, Cough [[id number]] Collected:  03/09/18 2000     Lab Status:  Preliminary result Specimen:  Sputum from Cough Updated:  03/09/18 2147     Gram Stain Result Few (2+) WBCs seen      Moderate (3+) Gram positive cocci in chains      Few (2+) Gram negative bacilli    Anaerobic Culture - Body Fluid, Pleural Cavity [[id number]]  (Normal) Collected:  03/09/18 1000    Lab Status:  Preliminary result Specimen:  Body Fluid from Pleural Cavity Updated:  03/09/18 1305     Culture No anaerobes isolated    Anaerobic Culture - Pleural Fluid, Pleural Cavity [[id number]]  (Normal) Collected:  03/08/18 1409    Lab Status:  Preliminary result Specimen:  Pleural Fluid from Pleural Cavity Updated:  03/09/18 1304     Culture No anaerobes isolated    AFB Culture - Body Fluid, Pleural Cavity [[id number]] Collected:  03/08/18 1408    Lab Status:  Preliminary result Specimen:  Body Fluid from Pleural Cavity Updated:  03/09/18 1125     AFB Stain No acid fast bacilli seen on concentrated smear    MRSA Screen, PCR - Swab, Nares [[id number]]  (Abnormal) Collected:  03/08/18 1605    Lab Status:  Final result Specimen:  Swab from Nares Updated:  03/08/18 1724     MRSA, PCR Positive (C)    Influenza A & B, RT PCR - Swab, Nasopharynx [[id number]]  (Normal) Collected:  03/08/18 1605    Lab Status:  Final result Specimen:  Swab from Nasopharynx Updated:  03/08/18 1648     Influenza A PCR Not Detected     Influenza B PCR Not Detected    KOH Prep - Body Fluid, Chest [[id number]]  (Normal) Collected:  03/08/18 1409    Lab Status:  Final result Specimen:  Body Fluid from Chest Updated:  03/08/18 1638     KOH Prep No yeast or hyphal elements seen          Imaging Results (last 24 hours)     Procedure Component Value Units Date/Time    XR Chest 1 View [[id number]] Updated:  03/11/18 0642    XR Chest 1 View [[id number]] Collected:  03/10/18 1500     Updated:  03/10/18 1822    Narrative:          EXAMINATION: XR CHEST 1 VW - 03/10/2018     INDICATION:  J18.1-Lobar pneumonia, unspecified organism; R55-Syncope  and  collapse; S01.01XA-Laceration without foreign body of scalp, initial  encounter.     COMPARISON: None.     FINDINGS:   1. Right chest tube is well-positioned. Significant pneumothorax is not  identified. There may be a trace apical cap. It is currently not  significant.     2. Remainder of the lungs are clear. The heart size is normal           Impression:          Stable findings in the chest when compared yesterday as described. There  may be a tiny trace apical cap on the right which is currently not  considered significant.     There is no active disease, edema or consolidation otherwise and the  right chest tube is well-positioned.     DICTATED:     03/10/2018  EDITED/ls :     03/10/2018      This report was finalized on 3/10/2018 6:20 PM by Dr. Deandre Garcia MD.                I have reviewed the medications.    Assessment/Plan   Assessment / Plan     Hospital Problem List     * (Principal)Right lower lobe pneumonia    Coronary artery disease    Hypertension    Dyslipidemia    Hypothyroidism    Community acquired pneumonia of right lower lobe of lung           Brief Hospital Course to date:  Gee Whitney is a 67 y.o. male PMH of limited tobacco abuse, remote MI, HTN, HLD, URI sx x 10 days admitted 3/7/2018 w/ syncope, found to have moderate sized right pleural effusion and possible pleural thickening    Assessment & Plan:    - R sided hemothorax from a fall: s/p chest tube placement 3/9. CT to LWS. CTS on board  - CAP with reactive airway disease: Completed 5 days of abx, prednisone D3, nebs    DVT Prophylaxis:  H    CODE STATUS: Full Code    Disposition: I expect the patient to be discharged after chest tube is removed    Daria Dupont MD  03/11/18  8:48 AM           DISPLAY PLAN FREE TEXT

## 2022-05-09 ENCOUNTER — OFFICE VISIT (OUTPATIENT)
Dept: CARDIOLOGY | Facility: CLINIC | Age: 72
End: 2022-05-09

## 2022-05-09 VITALS
HEIGHT: 69 IN | HEART RATE: 66 BPM | OXYGEN SATURATION: 95 % | SYSTOLIC BLOOD PRESSURE: 134 MMHG | WEIGHT: 172 LBS | BODY MASS INDEX: 25.48 KG/M2 | DIASTOLIC BLOOD PRESSURE: 74 MMHG

## 2022-05-09 DIAGNOSIS — I10 ESSENTIAL HYPERTENSION: ICD-10-CM

## 2022-05-09 DIAGNOSIS — E78.5 DYSLIPIDEMIA: ICD-10-CM

## 2022-05-09 DIAGNOSIS — I25.10 CORONARY ARTERY DISEASE INVOLVING NATIVE CORONARY ARTERY OF NATIVE HEART WITHOUT ANGINA PECTORIS: Primary | ICD-10-CM

## 2022-05-09 PROCEDURE — 99214 OFFICE O/P EST MOD 30 MIN: CPT | Performed by: NURSE PRACTITIONER

## 2022-05-09 RX ORDER — IPRATROPIUM BROMIDE 42 UG/1
2 SPRAY, METERED NASAL 3 TIMES DAILY
COMMUNITY
Start: 2022-04-20

## 2022-05-09 RX ORDER — LISINOPRIL 40 MG/1
40 TABLET ORAL DAILY
COMMUNITY
Start: 2022-03-31

## 2022-05-09 RX ORDER — TRAZODONE HYDROCHLORIDE 50 MG/1
1 TABLET ORAL NIGHTLY
COMMUNITY

## 2022-05-09 RX ORDER — ALBUTEROL SULFATE 90 UG/1
AEROSOL, METERED RESPIRATORY (INHALATION)
COMMUNITY

## 2022-05-09 RX ORDER — ATORVASTATIN CALCIUM 40 MG/1
40 TABLET, FILM COATED ORAL NIGHTLY
COMMUNITY
Start: 2022-02-10

## 2022-05-09 NOTE — PROGRESS NOTES
Subjective:     Encounter Date:05/09/2022    Primary Care Physician: Tejinder Masters MD      Patient ID: Gee Whitney is a 71 y.o. male.    Chief Complaint:Coronary Artery Disease and Hypertension    PROBLEM LIST:  1. Coronary artery disease:  a. [facility] for MI, 1977, Dr. Carlos Gilmore: Occluded LAD with reported collaterals per patient report, normal LVEF.  b. [facility], 2014: EF 50%, anterior apical scar. No ischemia.  c. US aorta, 04/27/2016: No abdominal aortic aneurysm.  d. Exercise [facility], 4/1/2019: prior infarction of the anterior apex, and anterior septum with no significant ischemia noted. EF 40%. Severe hypokinesis.  2. Hypertension.   3. Dyslipidemia.  4. COPD  5. COVID-19 1/2022  1. Did not require hospitalization  6. History of elevated LFTs.   7. Traumatic hemothorax 2018  8. Renal cyst.  9. Hypothyroidism.  10. Bilateral cataract surgery.    11. Remote tobacco abuse.        No Known Allergies      Current Outpatient Medications:   •  albuterol sulfate  (90 Base) MCG/ACT inhaler, albuterol sulfate HFA 90 mcg/actuation aerosol inhaler, Disp: , Rfl:   •  aspirin  MG tablet, Take 325 mg by mouth Daily., Disp: , Rfl:   •  atorvastatin (LIPITOR) 40 MG tablet, Take 40 mg by mouth Every Night., Disp: , Rfl:   •  cholecalciferol (VITAMIN D3) 1000 UNITS tablet, Take 5,000 Units by mouth Daily., Disp: , Rfl:   •  Docusate Calcium (STOOL SOFTENER PO), Take 1 tablet by mouth Daily. 100mg, Disp: , Rfl:   •  ipratropium (ATROVENT) 0.06 % nasal spray, , Disp: , Rfl:   •  levothyroxine (SYNTHROID, LEVOTHROID) 75 MCG tablet, Take 75 mcg by mouth Daily., Disp: , Rfl:   •  lisinopril (PRINIVIL,ZESTRIL) 40 MG tablet, , Disp: , Rfl:   •  Multiple Vitamins-Minerals (ICAPS AREDS 2 PO), Take  by mouth., Disp: , Rfl:   •  Multiple Vitamins-Minerals (MULTIVITAMIN ADULT PO), Take 1 tablet by mouth Daily., Disp: , Rfl:   •  nitroglycerin (NITROSTAT) 0.4 MG SL tablet, Place 0.4 mg under the tongue Every 5 (Five)  Minutes As Needed., Disp: , Rfl:   •  Omega-3 Fatty Acids (FISH OIL) 1200 MG capsule delayed-release, Take 1,200 mg by mouth Daily., Disp: , Rfl:   •  traZODone (DESYREL) 50 MG tablet, Take 1 tablet by mouth Every Night., Disp: , Rfl:   •  lisinopril (PRINIVIL,ZESTRIL) 30 MG tablet, Take 30 mg by mouth Daily., Disp: , Rfl:         History of Present Illness    Patient is a 71-year-old  male who is being seen today for annual follow-up of coronary artery disease.  Since last being seen he notes to overall be doing well.  Denies any chest pain, pressure, tightness.  Denies any increase shortness of breath.  No reported syncope, near-syncope, or edema.  Since last being seen he has been diagnosed with COPD.    The following portions of the patient's history were reviewed and updated as appropriate: allergies, current medications, past family history, past medical history, past social history, past surgical history and problem list.      Social History     Tobacco Use   • Smoking status: Former Smoker     Packs/day: 1.00     Years: 10.00     Pack years: 10.00     Types: Cigarettes     Quit date:      Years since quittin.3   • Smokeless tobacco: Never Used   Vaping Use   • Vaping Use: Never used   Substance Use Topics   • Alcohol use: Yes     Comment: occassionally    • Drug use: No         Review of Systems   Constitutional: Positive for malaise/fatigue.   Cardiovascular: Negative for chest pain, dyspnea on exertion, leg swelling, palpitations and syncope.   Respiratory: Negative.  Negative for shortness of breath.    Hematologic/Lymphatic: Negative for bleeding problem. Does not bruise/bleed easily.   Skin: Negative for rash.   Musculoskeletal: Positive for arthritis. Negative for muscle weakness and myalgias.   Gastrointestinal: Negative for heartburn, nausea and vomiting.   Neurological: Negative for dizziness, light-headedness, loss of balance and numbness.          Objective:   /74 (BP  "Location: Left arm, Patient Position: Sitting)   Pulse 66   Ht 175.3 cm (69.02\")   Wt 78 kg (172 lb)   SpO2 95%   BMI 25.39 kg/m²         Vitals reviewed.   Constitutional:       Appearance: Well-developed and not in distress.   Neck:      Vascular: No JVD.      Trachea: No tracheal deviation.   Pulmonary:      Effort: Pulmonary effort is normal.      Breath sounds: Normal breath sounds.   Cardiovascular:      Normal rate. Regular rhythm.   Pulses:     Intact distal pulses.   Edema:     Peripheral edema absent.   Abdominal:      General: Bowel sounds are normal.      Palpations: Abdomen is soft.      Tenderness: There is no abdominal tenderness.   Musculoskeletal:         General: No deformity. Skin:     General: Skin is warm and dry.   Neurological:      Mental Status: Alert and oriented to person, place, and time.         Procedures          Assessment:   Assessment/Plan      Diagnoses and all orders for this visit:    1. Coronary artery disease involving native coronary artery of native heart without angina pectoris (Primary).  Stable.  On aspirin every other day.    2. Essential hypertension, controlled.  On ACE inhibitor.    3. Dyslipidemia, stable.  On statin.      Plan:  1. Discussed with patient given some bruising he may decrease his aspirin to 81 mg daily.  Given samples in the office today.  2. Continue other current cardiac medications.  3. Follow-up in 1 year with myocardial perfusion study (last ischemic evaluation 2019) or sooner if needed.       Kait ASTORGA     Dictated utilizing Dragon dictation  "

## 2023-01-03 ENCOUNTER — TRANSCRIBE ORDERS (OUTPATIENT)
Dept: ADMINISTRATIVE | Facility: HOSPITAL | Age: 73
End: 2023-01-03
Payer: MEDICARE

## 2023-01-03 ENCOUNTER — HOSPITAL ENCOUNTER (OUTPATIENT)
Dept: GENERAL RADIOLOGY | Facility: HOSPITAL | Age: 73
Discharge: HOME | End: 2023-01-03
Admitting: FAMILY MEDICINE
Payer: MEDICARE

## 2023-01-03 DIAGNOSIS — J06.9 ACUTE UPPER RESPIRATORY INFECTION: Primary | ICD-10-CM

## 2023-01-03 DIAGNOSIS — J06.9 ACUTE UPPER RESPIRATORY INFECTION: ICD-10-CM

## 2023-01-03 PROCEDURE — 71046 X-RAY EXAM CHEST 2 VIEWS: CPT

## 2023-01-20 ENCOUNTER — OFFICE VISIT (OUTPATIENT)
Dept: PULMONOLOGY | Facility: CLINIC | Age: 73
End: 2023-01-20
Payer: MEDICARE

## 2023-01-20 VITALS
BODY MASS INDEX: 24.37 KG/M2 | HEART RATE: 74 BPM | TEMPERATURE: 97.7 F | RESPIRATION RATE: 18 BRPM | WEIGHT: 164.5 LBS | HEIGHT: 69 IN | DIASTOLIC BLOOD PRESSURE: 84 MMHG | SYSTOLIC BLOOD PRESSURE: 144 MMHG | OXYGEN SATURATION: 99 %

## 2023-01-20 DIAGNOSIS — J31.0 CHRONIC RHINITIS: ICD-10-CM

## 2023-01-20 DIAGNOSIS — R06.02 SOB (SHORTNESS OF BREATH): Primary | ICD-10-CM

## 2023-01-20 DIAGNOSIS — J41.0 SIMPLE CHRONIC BRONCHITIS: ICD-10-CM

## 2023-01-20 PROCEDURE — 94060 EVALUATION OF WHEEZING: CPT | Performed by: INTERNAL MEDICINE

## 2023-01-20 PROCEDURE — 99204 OFFICE O/P NEW MOD 45 MIN: CPT | Performed by: INTERNAL MEDICINE

## 2023-01-20 PROCEDURE — 94726 PLETHYSMOGRAPHY LUNG VOLUMES: CPT | Performed by: INTERNAL MEDICINE

## 2023-01-20 PROCEDURE — 94729 DIFFUSING CAPACITY: CPT | Performed by: INTERNAL MEDICINE

## 2023-01-20 RX ORDER — FLUTICASONE PROPIONATE AND SALMETEROL XINAFOATE 115; 21 UG/1; UG/1
2 AEROSOL, METERED RESPIRATORY (INHALATION)
COMMUNITY
Start: 2022-12-28 | End: 2023-01-20

## 2023-01-20 RX ORDER — AMLODIPINE BESYLATE 2.5 MG/1
2.5 TABLET ORAL DAILY
COMMUNITY
Start: 2023-01-12

## 2023-01-20 RX ORDER — TIOTROPIUM BROMIDE AND OLODATEROL 3.124; 2.736 UG/1; UG/1
2 SPRAY, METERED RESPIRATORY (INHALATION) DAILY
Qty: 1 EACH | Refills: 6 | Status: SHIPPED | OUTPATIENT
Start: 2023-01-20

## 2023-01-20 NOTE — LETTER
January 20, 2023     GAURI Bragg  [address] [facility] [address]  [facility] [id number]    Patient: Gee Whitney   YOB: 1950   Date of Visit: 1/20/2023     Dear GAURI Hampton:    Thank you for referring Gee Whitney to me for evaluation. Below are the relevant portions of my assessment and plan of care.    If you have questions, please do not hesitate to call me. I look forward to following Gee along with you.         Sincerely,        Gab Brooke MD        CC: No Recipients    Progress Notes:       New Pulmonary Patient Office Visit      Patient Name: Gee Whitney    Referring Physician: Nancy Anderson APRN    Chief Complaint:    Chief Complaint   Patient presents with   • Bronchitis   • Cough   • Wheezing       History of Present Illness: Gee Whitney is a 72 y.o. male who is here today to establish care with Pulmonary.    72-year-old male with a remote history of smoking and quit back in 1977 presenting with complains of chronic cough which has been going on for long time.  Patient has history of coronary disease, hypothyroidism, dyslipidemia and has also been labeled as COPD.  Patient also has history of right-sided hemothorax for which she was hospitalized back in 2018 and underwent chest tube drainage.  No malignancy noted at that time.  Hemothorax was thought to be due to worsening cough because of pleural hemorrhage from increased pressures.  He states that he was in usual state of health up until November when he started having bronchitic symptoms with cough and purulent sputum.  He was given Z-Danielito.  He also was having sinus congestion at that time.  Denied any hemoptysis.  Denies any fevers, chills.  He subsequently went to [place] to visit family around [place] time and 1 December 27 he had another visit to urgent care there where he was given Z-Danielito and Tessalon Perles.  States that did not do much for him then on January 3 he was seen here had was  treated with prednisone and doxycycline.  Prednisone seem to have helped his cough a little.  At that time he was also found to be influenza positive but apparently was not given any antiviral agents.  He feels that his shortness of breath is little better but he still coughs for about 30 minutes in the morning.  He clears his throat constantly.  Denies any heartburn or reflux symptoms.  Denies any fevers, chills or night sweats.  Denies any change in appetite or weight loss.  He sleeps okay but in the morning he starts coughing.  Denies any nighttime cough.  No pets at home.  No carpets.  No swing pool or hot tub use.  He used to work in  for [employer] and states that environment was aditya but denies any other environmental exposures.  Occasional alcohol use no illicit drug use.  No other sick contacts.  No secondhand smoke exposure either.  No black mold exposure.  House environment may be on the drier side.    Subjective      Review of Systems:   Review of Systems   Constitutional: Negative.    HENT: Negative.    Respiratory: Positive for cough and wheezing.    Cardiovascular: Negative.    Gastrointestinal: Negative.    Endocrine: Negative.    Musculoskeletal: Negative.    Skin: Negative.    Neurological: Negative.    Hematological: Negative.    Psychiatric/Behavioral: Negative.    All other systems reviewed and are negative.      Past Medical History:   Past Medical History:   Diagnosis Date   • Disease of thyroid gland    • Hyperlipidemia    • Hypertension    • Myocardial infarction (HCC)        Past Surgical History:   Past Surgical History:   Procedure Laterality Date   • BRONCHOSCOPY     • HERNIA REPAIR     • TENDON RELEASE      Left thumb        Family History:   Family History   Problem Relation Age of Onset   • Heart failure Mother    • Other Father 28         in car accident   • Heart attack Sister    • Mitral valve prolapse Sister    • Asthma Brother    • Stroke Brother    •  Hypertension Child        Social History:   Social History     Socioeconomic History   • Marital status:    • Number of children: 2   Tobacco Use   • Smoking status: Former     Packs/day: 1.00     Years: 10.00     Pack years: 10.00     Types: Cigarettes     Quit date:      Years since quittin.0   • Smokeless tobacco: Never   Vaping Use   • Vaping Use: Never used   Substance and Sexual Activity   • Alcohol use: Yes     Comment: occassionally    • Drug use: No   • Sexual activity: Defer       Medications:     Current Outpatient Medications:   •  albuterol sulfate  (90 Base) MCG/ACT inhaler, albuterol sulfate HFA 90 mcg/actuation aerosol inhaler, Disp: , Rfl:   •  amLODIPine (NORVASC) 2.5 MG tablet, Take 2.5 mg by mouth Daily., Disp: , Rfl:   •  aspirin  MG tablet, Take 325 mg by mouth Daily., Disp: , Rfl:   •  atorvastatin (LIPITOR) 40 MG tablet, Take 40 mg by mouth Every Night., Disp: , Rfl:   •  cholecalciferol (VITAMIN D3) 1000 UNITS tablet, Take 5,000 Units by mouth Daily., Disp: , Rfl:   •  Docusate Calcium (STOOL SOFTENER PO), Take 1 tablet by mouth Daily. 100mg, Disp: , Rfl:   •  ipratropium (ATROVENT) 0.06 % nasal spray, 2 sprays into the nostril(s) as directed by provider 3 (Three) Times a Day., Disp: , Rfl:   •  levothyroxine (SYNTHROID, LEVOTHROID) 75 MCG tablet, Take 75 mcg by mouth Daily., Disp: , Rfl:   •  lisinopril (PRINIVIL,ZESTRIL) 40 MG tablet, Take 40 mg by mouth Daily., Disp: , Rfl:   •  Multiple Vitamins-Minerals (ICAPS AREDS 2 PO), Take  by mouth., Disp: , Rfl:   •  Multiple Vitamins-Minerals (MULTIVITAMIN ADULT PO), Take 1 tablet by mouth Daily., Disp: , Rfl:   •  nitroglycerin (NITROSTAT) 0.4 MG SL tablet, Place 0.4 mg under the tongue Every 5 (Five) Minutes As Needed., Disp: , Rfl:   •  Omega-3 Fatty Acids (FISH OIL) 1200 MG capsule delayed-release, Take 1,200 mg by mouth Daily., Disp: , Rfl:   •  traZODone (DESYREL) 50 MG tablet, Take 1 tablet by mouth Every  "Night., Disp: , Rfl:   •  tiotropium bromide-olodaterol (Stiolto Respimat) 2.5-2.5 MCG/ACT aerosol solution inhaler, Inhale 2 puffs Daily., Disp: 1 each, Rfl: 6    Allergies:   Allergies   Allergen Reactions   • Latex Unknown - High Severity       Objective     Physical Exam:  Vital Signs:   Vitals:    01/20/23 0839   BP: 144/84   BP Location: Left arm   Patient Position: Sitting   Cuff Size: Adult   Pulse: 74   Resp: 18   Temp: 97.7 °F (36.5 °C)   SpO2: 99%  Comment: room air at rest   Weight: 74.6 kg (164 lb 8 oz)   Height: 175.3 cm (69.02\")       Physical Exam  Vitals and nursing note reviewed.   Constitutional:       General: He is not in acute distress.     Appearance: He is well-developed. He is not diaphoretic.   HENT:      Head: Normocephalic and atraumatic.      Nose: Nose normal.      Mouth/Throat:      Pharynx: No oropharyngeal exudate.   Eyes:      General:         Right eye: No discharge.         Left eye: No discharge.      Pupils: Pupils are equal, round, and reactive to light.   Neck:      Thyroid: No thyromegaly.      Trachea: No tracheal deviation.   Cardiovascular:      Rate and Rhythm: Normal rate and regular rhythm.      Heart sounds: Normal heart sounds. No murmur heard.    No friction rub. No gallop.   Pulmonary:      Effort: Pulmonary effort is normal. No respiratory distress.      Breath sounds: Normal breath sounds. No wheezing or rales.   Musculoskeletal:         General: No tenderness.      Cervical back: Neck supple.      Right lower leg: No edema.      Left lower leg: No edema.      Comments: Clubbing: none   Neurological:      Mental Status: He is alert and oriented to person, place, and time.      Cranial Nerves: No cranial nerve deficit.      Coordination: Coordination normal.   Psychiatric:         Behavior: Behavior normal.         Thought Content: Thought content normal.         Judgment: Judgment normal.         Results Review:   I reviewed the patient's new clinical " results.    Chest x-ray done on January 3 reviewed and showed normal cardiac silhouette.  Normal pulmonary vasculature.  No acute cardiopulmonary disease process.  No pleural effusions or consolidations noted.    PFT IMPRESSION:   1. Available data suggests normal.    2. No significant bronchodilator response, but this does not preclude their clinical use.  3. Lung volume reveals normal.       4. Airway resistance is elevated.  5. DLCO is normal.      Assessment / Plan      Assessment:   Problem List Items Addressed This Visit    None  Visit Diagnoses     SOB (shortness of breath)    -  Primary    Relevant Orders    Pulmonary Function Test (Completed)    Simple chronic bronchitis (HCC)        Relevant Medications    tiotropium bromide-olodaterol (Stiolto Respimat) 2.5-2.5 MCG/ACT aerosol solution inhaler    Chronic rhinitis              Plan:   1.  Patient clinical exam and imaging and history are concerning for chronic bronchitis.  Etiology of this is not clear which may include previous COPD.  No definite hypersensitivity component noted.  No ongoing allergies.  Advised patient to check immunity level and try to keep immunity within 40 to 50% range.  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  Advised to use nasal saline rinses instead to help keep nasal passages moist to prevent postnasal drip and constant throat clearing.  He will try that and see if that helps out.    2.  Patient has been placed on Advair inhaler which she does not think is doing much.  His technique is not correct of using the inhaler.  I went over the correct technique of using the inhalers with him.  Have provided him spacer to use with the inhaler.  He was not rinsing mouth after using Advair either.  At this point given his lung function I do not think he need inhaled steroids.  I did switch him to Stiolto Respimat.  Side effects reviewed with him.  Correct  technique of using this inhaler reviewed as well.  Sample provided to trial and if he does well then he will fill the prescription.  Hopefully with long-acting beta and muscarinic agents we can help control his airway inflammation and improve his cough and sputum production.  He is agreeable to try this.  Reassured that so far I am not seeing any fixed obstruction which is reassuring.    3.  Patient is not sure if he has had Prevnar 20 immunization.  If he has not had then I will recommend that as well.  He will check with primary care Dr. Masters.    4.  Patient is on lisinopril but apparently has been on it for long time.  If cough does not improve will consider trial of lisinopril and switch it to angiotensin receptor blockers.    5.  Other consideration could be given to bronchiectasis which may be leading to some of his symptoms.  Chest x-rays are not suggestive.  If cough does not improve will consider high-resolution CT scan to further evaluate lung parenchyma.  If he starts producing more sputum then we will proceed with sputum cultures as well.    Thank you for allowing me to participate in the care of this patient.  We will follow him closely    Follow Up:   3-4 months    Discussed plan of care in detail with patient today. Patient verbally understands and agrees.     Gab Brooke MD  Pulmonary Critical Care and Sleep Medicine

## 2023-01-20 NOTE — PROGRESS NOTES
New Pulmonary Patient Office Visit      Patient Name: Gee Whitney    Referring Physician: Nancy Anderson APRN    Chief Complaint:    Chief Complaint   Patient presents with   • Bronchitis   • Cough   • Wheezing       History of Present Illness: Gee Whitney is a 72 y.o. male who is here today to establish care with Pulmonary.    72-year-old male with a remote history of smoking and quit back in 1977 presenting with complains of chronic cough which has been going on for long time.  Patient has history of coronary disease, hypothyroidism, dyslipidemia and has also been labeled as COPD.  Patient also has history of right-sided hemothorax for which she was hospitalized back in 2018 and underwent chest tube drainage.  No malignancy noted at that time.  Hemothorax was thought to be due to worsening cough because of pleural hemorrhage from increased pressures.  He states that he was in usual state of health up until November when he started having bronchitic symptoms with cough and purulent sputum.  He was given Z-Danielito.  He also was having sinus congestion at that time.  Denied any hemoptysis.  Denies any fevers, chills.  He subsequently went to [place] to visit family around [place] time and 1 December 27 he had another visit to urgent care there where he was given Z-Danielito and Tessalon Perles.  States that did not do much for him then on January 3 he was seen here had was treated with prednisone and doxycycline.  Prednisone seem to have helped his cough a little.  At that time he was also found to be influenza positive but apparently was not given any antiviral agents.  He feels that his shortness of breath is little better but he still coughs for about 30 minutes in the morning.  He clears his throat constantly.  Denies any heartburn or reflux symptoms.  Denies any fevers, chills or night sweats.  Denies any change in appetite or weight loss.  He sleeps okay but in the morning he starts coughing.   Denies any nighttime cough.  No pets at home.  No carpets.  No swing pool or hot tub use.  He used to work in  for [employer] and states that environment was aditya but denies any other environmental exposures.  Occasional alcohol use no illicit drug use.  No other sick contacts.  No secondhand smoke exposure either.  No black mold exposure.  House environment may be on the drier side.    Subjective      Review of Systems:   Review of Systems   Constitutional: Negative.    HENT: Negative.    Respiratory: Positive for cough and wheezing.    Cardiovascular: Negative.    Gastrointestinal: Negative.    Endocrine: Negative.    Musculoskeletal: Negative.    Skin: Negative.    Neurological: Negative.    Hematological: Negative.    Psychiatric/Behavioral: Negative.    All other systems reviewed and are negative.      Past Medical History:   Past Medical History:   Diagnosis Date   • Disease of thyroid gland    • Hyperlipidemia    • Hypertension    • Myocardial infarction (HCC)        Past Surgical History:   Past Surgical History:   Procedure Laterality Date   • BRONCHOSCOPY     • HERNIA REPAIR     • TENDON RELEASE      Left thumb        Family History:   Family History   Problem Relation Age of Onset   • Heart failure Mother    • Other Father 28         in car accident   • Heart attack Sister    • Mitral valve prolapse Sister    • Asthma Brother    • Stroke Brother    • Hypertension Child        Social History:   Social History     Socioeconomic History   • Marital status:    • Number of children: 2   Tobacco Use   • Smoking status: Former     Packs/day: 1.00     Years: 10.00     Pack years: 10.00     Types: Cigarettes     Quit date:      Years since quittin.0   • Smokeless tobacco: Never   Vaping Use   • Vaping Use: Never used   Substance and Sexual Activity   • Alcohol use: Yes     Comment: occassionally    • Drug use: No   • Sexual activity: Defer       Medications:     Current  Outpatient Medications:   •  albuterol sulfate  (90 Base) MCG/ACT inhaler, albuterol sulfate HFA 90 mcg/actuation aerosol inhaler, Disp: , Rfl:   •  amLODIPine (NORVASC) 2.5 MG tablet, Take 2.5 mg by mouth Daily., Disp: , Rfl:   •  aspirin  MG tablet, Take 325 mg by mouth Daily., Disp: , Rfl:   •  atorvastatin (LIPITOR) 40 MG tablet, Take 40 mg by mouth Every Night., Disp: , Rfl:   •  cholecalciferol (VITAMIN D3) 1000 UNITS tablet, Take 5,000 Units by mouth Daily., Disp: , Rfl:   •  Docusate Calcium (STOOL SOFTENER PO), Take 1 tablet by mouth Daily. 100mg, Disp: , Rfl:   •  ipratropium (ATROVENT) 0.06 % nasal spray, 2 sprays into the nostril(s) as directed by provider 3 (Three) Times a Day., Disp: , Rfl:   •  levothyroxine (SYNTHROID, LEVOTHROID) 75 MCG tablet, Take 75 mcg by mouth Daily., Disp: , Rfl:   •  lisinopril (PRINIVIL,ZESTRIL) 40 MG tablet, Take 40 mg by mouth Daily., Disp: , Rfl:   •  Multiple Vitamins-Minerals (ICAPS AREDS 2 PO), Take  by mouth., Disp: , Rfl:   •  Multiple Vitamins-Minerals (MULTIVITAMIN ADULT PO), Take 1 tablet by mouth Daily., Disp: , Rfl:   •  nitroglycerin (NITROSTAT) 0.4 MG SL tablet, Place 0.4 mg under the tongue Every 5 (Five) Minutes As Needed., Disp: , Rfl:   •  Omega-3 Fatty Acids (FISH OIL) 1200 MG capsule delayed-release, Take 1,200 mg by mouth Daily., Disp: , Rfl:   •  traZODone (DESYREL) 50 MG tablet, Take 1 tablet by mouth Every Night., Disp: , Rfl:   •  tiotropium bromide-olodaterol (Stiolto Respimat) 2.5-2.5 MCG/ACT aerosol solution inhaler, Inhale 2 puffs Daily., Disp: 1 each, Rfl: 6    Allergies:   Allergies   Allergen Reactions   • Latex Unknown - High Severity       Objective     Physical Exam:  Vital Signs:   Vitals:    01/20/23 0839   BP: 144/84   BP Location: Left arm   Patient Position: Sitting   Cuff Size: Adult   Pulse: 74   Resp: 18   Temp: 97.7 °F (36.5 °C)   SpO2: 99%  Comment: room air at rest   Weight: 74.6 kg (164 lb 8 oz)   Height: 175.3 cm  "(69.02\")       Physical Exam  Vitals and nursing note reviewed.   Constitutional:       General: He is not in acute distress.     Appearance: He is well-developed. He is not diaphoretic.   HENT:      Head: Normocephalic and atraumatic.      Nose: Nose normal.      Mouth/Throat:      Pharynx: No oropharyngeal exudate.   Eyes:      General:         Right eye: No discharge.         Left eye: No discharge.      Pupils: Pupils are equal, round, and reactive to light.   Neck:      Thyroid: No thyromegaly.      Trachea: No tracheal deviation.   Cardiovascular:      Rate and Rhythm: Normal rate and regular rhythm.      Heart sounds: Normal heart sounds. No murmur heard.    No friction rub. No gallop.   Pulmonary:      Effort: Pulmonary effort is normal. No respiratory distress.      Breath sounds: Normal breath sounds. No wheezing or rales.   Musculoskeletal:         General: No tenderness.      Cervical back: Neck supple.      Right lower leg: No edema.      Left lower leg: No edema.      Comments: Clubbing: none   Neurological:      Mental Status: He is alert and oriented to person, place, and time.      Cranial Nerves: No cranial nerve deficit.      Coordination: Coordination normal.   Psychiatric:         Behavior: Behavior normal.         Thought Content: Thought content normal.         Judgment: Judgment normal.         Results Review:   I reviewed the patient's new clinical results.    Chest x-ray done on January 3 reviewed and showed normal cardiac silhouette.  Normal pulmonary vasculature.  No acute cardiopulmonary disease process.  No pleural effusions or consolidations noted.    PFT IMPRESSION:   1. Available data suggests normal.    2. No significant bronchodilator response, but this does not preclude their clinical use.  3. Lung volume reveals normal.       4. Airway resistance is elevated.  5. DLCO is normal.      Assessment / Plan      Assessment:   Problem List Items Addressed This Visit    None  Visit " Diagnoses     SOB (shortness of breath)    -  Primary    Relevant Orders    Pulmonary Function Test (Completed)    Simple chronic bronchitis (HCC)        Relevant Medications    tiotropium bromide-olodaterol (Stiolto Respimat) 2.5-2.5 MCG/ACT aerosol solution inhaler    Chronic rhinitis              Plan:   1.  Patient clinical exam and imaging and history are concerning for chronic bronchitis.  Etiology of this is not clear which may include previous COPD.  No definite hypersensitivity component noted.  No ongoing allergies.  Advised patient to check immunity level and try to keep immunity within 40 to 50% range.  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  Advised to use nasal saline rinses instead to help keep nasal passages moist to prevent postnasal drip and constant throat clearing.  He will try that and see if that helps out.    2.  Patient has been placed on Advair inhaler which she does not think is doing much.  His technique is not correct of using the inhaler.  I went over the correct technique of using the inhalers with him.  Have provided him spacer to use with the inhaler.  He was not rinsing mouth after using Advair either.  At this point given his lung function I do not think he need inhaled steroids.  I did switch him to Stiolto Respimat.  Side effects reviewed with him.  Correct technique of using this inhaler reviewed as well.  Sample provided to trial and if he does well then he will fill the prescription.  Hopefully with long-acting beta and muscarinic agents we can help control his airway inflammation and improve his cough and sputum production.  He is agreeable to try this.  Reassured that so far I am not seeing any fixed obstruction which is reassuring.    3.  Patient is not sure if he has had Prevnar 20 immunization.  If he has not had then I will recommend that as well.  He will check with primary care   [place].    4.  Patient is on lisinopril but apparently has been on it for long time.  If cough does not improve will consider trial of lisinopril and switch it to angiotensin receptor blockers.    5.  Other consideration could be given to bronchiectasis which may be leading to some of his symptoms.  Chest x-rays are not suggestive.  If cough does not improve will consider high-resolution CT scan to further evaluate lung parenchyma.  If he starts producing more sputum then we will proceed with sputum cultures as well.    Thank you for allowing me to participate in the care of this patient.  We will follow him closely    Follow Up:   3-4 months    Discussed plan of care in detail with patient today. Patient verbally understands and agrees.     Gab Brooke MD  Pulmonary Critical Care and Sleep Medicine

## 2023-02-14 ENCOUNTER — APPOINTMENT (OUTPATIENT)
Dept: GENERAL RADIOLOGY | Facility: HOSPITAL | Age: 73
End: 2023-02-14
Payer: MEDICARE

## 2023-02-14 ENCOUNTER — HOSPITAL ENCOUNTER (OUTPATIENT)
Facility: HOSPITAL | Age: 73
Setting detail: OBSERVATION
Discharge: HOME | End: 2023-02-15
Attending: STUDENT IN AN ORGANIZED HEALTH CARE EDUCATION/TRAINING PROGRAM | Admitting: INTERNAL MEDICINE
Payer: MEDICARE

## 2023-02-14 ENCOUNTER — APPOINTMENT (OUTPATIENT)
Dept: CT IMAGING | Facility: HOSPITAL | Age: 73
End: 2023-02-14
Payer: MEDICARE

## 2023-02-14 DIAGNOSIS — R11.2 NAUSEA AND VOMITING, UNSPECIFIED VOMITING TYPE: ICD-10-CM

## 2023-02-14 DIAGNOSIS — K52.9 ACUTE GASTROENTERITIS: Primary | ICD-10-CM

## 2023-02-14 DIAGNOSIS — Z86.39 HISTORY OF HYPERLIPIDEMIA: ICD-10-CM

## 2023-02-14 DIAGNOSIS — Z86.79 HISTORY OF CORONARY ARTERY DISEASE: ICD-10-CM

## 2023-02-14 DIAGNOSIS — R19.7 DIARRHEA OF PRESUMED INFECTIOUS ORIGIN: ICD-10-CM

## 2023-02-14 DIAGNOSIS — I25.2 HISTORY OF MYOCARDIAL INFARCTION: ICD-10-CM

## 2023-02-14 DIAGNOSIS — Z86.79 HISTORY OF HYPERTENSION: ICD-10-CM

## 2023-02-14 DIAGNOSIS — R10.10 PAIN OF UPPER ABDOMEN: ICD-10-CM

## 2023-02-14 PROBLEM — N17.9 AKI (ACUTE KIDNEY INJURY) (HCC): Status: ACTIVE | Noted: 2023-02-14

## 2023-02-14 PROBLEM — K56.7 ILEUS (HCC): Status: ACTIVE | Noted: 2023-02-14

## 2023-02-14 PROBLEM — D72.829 LEUKOCYTOSIS: Status: ACTIVE | Noted: 2023-02-14

## 2023-02-14 LAB
ADV 40+41 DNA STL QL NAA+NON-PROBE: NOT DETECTED
ALBUMIN SERPL-MCNC: 4 G/DL (ref 3.5–5.2)
ALBUMIN/GLOB SERPL: 1.5 G/DL
ALP SERPL-CCNC: 72 U/L (ref 39–117)
ALT SERPL W P-5'-P-CCNC: 12 U/L (ref 1–41)
ANION GAP SERPL CALCULATED.3IONS-SCNC: 12 MMOL/L (ref 5–15)
ANION GAP SERPL CALCULATED.3IONS-SCNC: 14 MMOL/L (ref 5–15)
AST SERPL-CCNC: 21 U/L (ref 1–40)
ASTRO TYP 1-8 RNA STL QL NAA+NON-PROBE: NOT DETECTED
BACTERIA UR QL AUTO: ABNORMAL /HPF
BASOPHILS # BLD AUTO: 0.04 10*3/MM3 (ref 0–0.2)
BASOPHILS NFR BLD AUTO: 0.2 % (ref 0–1.5)
BILIRUB SERPL-MCNC: 0.3 MG/DL (ref 0–1.2)
BILIRUB UR QL STRIP: NEGATIVE
BUN SERPL-MCNC: 39 MG/DL (ref 8–23)
BUN SERPL-MCNC: 40 MG/DL (ref 8–23)
BUN/CREAT SERPL: 28.5 (ref 7–25)
BUN/CREAT SERPL: 31.5 (ref 7–25)
C CAYETANENSIS DNA STL QL NAA+NON-PROBE: NOT DETECTED
C COLI+JEJ+UPSA DNA STL QL NAA+NON-PROBE: NOT DETECTED
CALCIUM SPEC-SCNC: 8.2 MG/DL (ref 8.6–10.5)
CALCIUM SPEC-SCNC: 9.2 MG/DL (ref 8.6–10.5)
CHLORIDE SERPL-SCNC: 103 MMOL/L (ref 98–107)
CHLORIDE SERPL-SCNC: 106 MMOL/L (ref 98–107)
CLARITY UR: CLEAR
CO2 SERPL-SCNC: 19 MMOL/L (ref 22–29)
CO2 SERPL-SCNC: 21 MMOL/L (ref 22–29)
COLOR UR: ABNORMAL
CREAT SERPL-MCNC: 1.27 MG/DL (ref 0.76–1.27)
CREAT SERPL-MCNC: 1.37 MG/DL (ref 0.76–1.27)
CRYPTOSP DNA STL QL NAA+NON-PROBE: NOT DETECTED
D-LACTATE SERPL-SCNC: 1.5 MMOL/L (ref 0.5–2)
DEPRECATED RDW RBC AUTO: 43.8 FL (ref 37–54)
DEPRECATED RDW RBC AUTO: 44.4 FL (ref 37–54)
E HISTOLYT DNA STL QL NAA+NON-PROBE: NOT DETECTED
EAEC PAA PLAS AGGR+AATA ST NAA+NON-PRB: NOT DETECTED
EC STX1+STX2 GENES STL QL NAA+NON-PROBE: NOT DETECTED
EGFRCR SERPLBLD CKD-EPI 2021: 54.1 ML/MIN/1.73
EGFRCR SERPLBLD CKD-EPI 2021: 59.3 ML/MIN/1.73
EOSINOPHIL # BLD AUTO: 0.25 10*3/MM3 (ref 0–0.4)
EOSINOPHIL NFR BLD AUTO: 1.5 % (ref 0.3–6.2)
EPEC EAE GENE STL QL NAA+NON-PROBE: NOT DETECTED
ERYTHROCYTE [DISTWIDTH] IN BLOOD BY AUTOMATED COUNT: 13 % (ref 12.3–15.4)
ERYTHROCYTE [DISTWIDTH] IN BLOOD BY AUTOMATED COUNT: 13.2 % (ref 12.3–15.4)
ETEC LTA+ST1A+ST1B TOX ST NAA+NON-PROBE: NOT DETECTED
FLUAV RNA RESP QL NAA+PROBE: NOT DETECTED
FLUBV RNA RESP QL NAA+PROBE: NOT DETECTED
G LAMBLIA DNA STL QL NAA+NON-PROBE: NOT DETECTED
GEN 5 2HR TROPONIN T REFLEX: 23 NG/L
GLOBULIN UR ELPH-MCNC: 2.6 GM/DL
GLUCOSE SERPL-MCNC: 119 MG/DL (ref 65–99)
GLUCOSE SERPL-MCNC: 164 MG/DL (ref 65–99)
GLUCOSE UR STRIP-MCNC: NEGATIVE MG/DL
HCT VFR BLD AUTO: 46.7 % (ref 37.5–51)
HCT VFR BLD AUTO: 50.1 % (ref 37.5–51)
HEMOCCULT STL QL: POSITIVE
HGB BLD-MCNC: 15.8 G/DL (ref 13–17.7)
HGB BLD-MCNC: 16.8 G/DL (ref 13–17.7)
HGB UR QL STRIP.AUTO: NEGATIVE
HOLD SPECIMEN: NORMAL
HYALINE CASTS UR QL AUTO: ABNORMAL /LPF
IMM GRANULOCYTES # BLD AUTO: 0.06 10*3/MM3 (ref 0–0.05)
IMM GRANULOCYTES NFR BLD AUTO: 0.4 % (ref 0–0.5)
KETONES UR QL STRIP: ABNORMAL
LEUKOCYTE ESTERASE UR QL STRIP.AUTO: ABNORMAL
LIPASE SERPL-CCNC: 30 U/L (ref 13–60)
LYMPHOCYTES # BLD AUTO: 0.59 10*3/MM3 (ref 0.7–3.1)
LYMPHOCYTES NFR BLD AUTO: 3.6 % (ref 19.6–45.3)
MAGNESIUM SERPL-MCNC: 1.4 MG/DL (ref 1.6–2.4)
MCH RBC QN AUTO: 30.8 PG (ref 26.6–33)
MCH RBC QN AUTO: 31.3 PG (ref 26.6–33)
MCHC RBC AUTO-ENTMCNC: 33.5 G/DL (ref 31.5–35.7)
MCHC RBC AUTO-ENTMCNC: 33.8 G/DL (ref 31.5–35.7)
MCV RBC AUTO: 91.8 FL (ref 79–97)
MCV RBC AUTO: 92.7 FL (ref 79–97)
MONOCYTES # BLD AUTO: 0.83 10*3/MM3 (ref 0.1–0.9)
MONOCYTES NFR BLD AUTO: 5 % (ref 5–12)
MUCOUS THREADS URNS QL MICRO: ABNORMAL /HPF
NEUTROPHILS NFR BLD AUTO: 14.68 10*3/MM3 (ref 1.7–7)
NEUTROPHILS NFR BLD AUTO: 89.3 % (ref 42.7–76)
NITRITE UR QL STRIP: NEGATIVE
NOROVIRUS GI+II RNA STL QL NAA+NON-PROBE: NOT DETECTED
NRBC BLD AUTO-RTO: 0 /100 WBC (ref 0–0.2)
P SHIGELLOIDES DNA STL QL NAA+NON-PROBE: NOT DETECTED
PH UR STRIP.AUTO: <=5 [PH] (ref 5–8)
PLATELET # BLD AUTO: 203 10*3/MM3 (ref 140–450)
PLATELET # BLD AUTO: 220 10*3/MM3 (ref 140–450)
PMV BLD AUTO: 8.8 FL (ref 6–12)
PMV BLD AUTO: 9 FL (ref 6–12)
POTASSIUM SERPL-SCNC: 4.6 MMOL/L (ref 3.5–5.2)
POTASSIUM SERPL-SCNC: 4.9 MMOL/L (ref 3.5–5.2)
PROCALCITONIN SERPL-MCNC: 0.11 NG/ML (ref 0–0.25)
PROT SERPL-MCNC: 6.6 G/DL (ref 6–8.5)
PROT UR QL STRIP: ABNORMAL
RBC # BLD AUTO: 5.04 10*6/MM3 (ref 4.14–5.8)
RBC # BLD AUTO: 5.46 10*6/MM3 (ref 4.14–5.8)
RBC # UR STRIP: ABNORMAL /HPF
REF LAB TEST METHOD: ABNORMAL
RSV RNA NPH QL NAA+NON-PROBE: NOT DETECTED
RVA RNA STL QL NAA+NON-PROBE: NOT DETECTED
S ENT+BONG DNA STL QL NAA+NON-PROBE: NOT DETECTED
SAPO I+II+IV+V RNA STL QL NAA+NON-PROBE: NOT DETECTED
SARS-COV-2 RNA RESP QL NAA+PROBE: NOT DETECTED
SHIGELLA SP+EIEC IPAH ST NAA+NON-PROBE: NOT DETECTED
SODIUM SERPL-SCNC: 137 MMOL/L (ref 136–145)
SODIUM SERPL-SCNC: 138 MMOL/L (ref 136–145)
SP GR UR STRIP: >=1.03 (ref 1–1.03)
SQUAMOUS #/AREA URNS HPF: ABNORMAL /HPF
TROPONIN T DELTA: -10 NG/L
TROPONIN T SERPL HS-MCNC: 33 NG/L
TSH SERPL DL<=0.05 MIU/L-ACNC: 1.52 UIU/ML (ref 0.27–4.2)
UROBILINOGEN UR QL STRIP: ABNORMAL
V CHOL+PARA+VUL DNA STL QL NAA+NON-PROBE: NOT DETECTED
V CHOLERAE DNA STL QL NAA+NON-PROBE: NOT DETECTED
WBC # UR STRIP: ABNORMAL /HPF
WBC NRBC COR # BLD: 13.43 10*3/MM3 (ref 3.4–10.8)
WBC NRBC COR # BLD: 16.45 10*3/MM3 (ref 3.4–10.8)
WHOLE BLOOD HOLD COAG: NORMAL
WHOLE BLOOD HOLD SPECIMEN: NORMAL
Y ENTEROCOL DNA STL QL NAA+NON-PROBE: NOT DETECTED

## 2023-02-14 PROCEDURE — 99214 OFFICE O/P EST MOD 30 MIN: CPT | Performed by: NURSE PRACTITIONER

## 2023-02-14 PROCEDURE — 96375 TX/PRO/DX INJ NEW DRUG ADDON: CPT

## 2023-02-14 PROCEDURE — 84484 ASSAY OF TROPONIN QUANT: CPT | Performed by: STUDENT IN AN ORGANIZED HEALTH CARE EDUCATION/TRAINING PROGRAM

## 2023-02-14 PROCEDURE — 96365 THER/PROPH/DIAG IV INF INIT: CPT

## 2023-02-14 PROCEDURE — 81001 URINALYSIS AUTO W/SCOPE: CPT | Performed by: STUDENT IN AN ORGANIZED HEALTH CARE EDUCATION/TRAINING PROGRAM

## 2023-02-14 PROCEDURE — 82272 OCCULT BLD FECES 1-3 TESTS: CPT

## 2023-02-14 PROCEDURE — G0378 HOSPITAL OBSERVATION PER HR: HCPCS

## 2023-02-14 PROCEDURE — 85025 COMPLETE CBC W/AUTO DIFF WBC: CPT | Performed by: STUDENT IN AN ORGANIZED HEALTH CARE EDUCATION/TRAINING PROGRAM

## 2023-02-14 PROCEDURE — 84443 ASSAY THYROID STIM HORMONE: CPT | Performed by: INTERNAL MEDICINE

## 2023-02-14 PROCEDURE — 99222 1ST HOSP IP/OBS MODERATE 55: CPT

## 2023-02-14 PROCEDURE — 74177 CT ABD & PELVIS W/CONTRAST: CPT

## 2023-02-14 PROCEDURE — 25010000002 IOPAMIDOL 61 % SOLUTION: Performed by: STUDENT IN AN ORGANIZED HEALTH CARE EDUCATION/TRAINING PROGRAM

## 2023-02-14 PROCEDURE — C9803 HOPD COVID-19 SPEC COLLECT: HCPCS

## 2023-02-14 PROCEDURE — 96367 TX/PROPH/DG ADDL SEQ IV INF: CPT

## 2023-02-14 PROCEDURE — 99285 EMERGENCY DEPT VISIT HI MDM: CPT

## 2023-02-14 PROCEDURE — 80053 COMPREHEN METABOLIC PANEL: CPT | Performed by: STUDENT IN AN ORGANIZED HEALTH CARE EDUCATION/TRAINING PROGRAM

## 2023-02-14 PROCEDURE — 25010000002 PIPERACILLIN SOD-TAZOBACTAM PER 1 G: Performed by: PHYSICIAN ASSISTANT

## 2023-02-14 PROCEDURE — 74018 RADEX ABDOMEN 1 VIEW: CPT

## 2023-02-14 PROCEDURE — 85027 COMPLETE CBC AUTOMATED: CPT | Performed by: INTERNAL MEDICINE

## 2023-02-14 PROCEDURE — 84145 PROCALCITONIN (PCT): CPT | Performed by: INTERNAL MEDICINE

## 2023-02-14 PROCEDURE — 83690 ASSAY OF LIPASE: CPT | Performed by: STUDENT IN AN ORGANIZED HEALTH CARE EDUCATION/TRAINING PROGRAM

## 2023-02-14 PROCEDURE — 87040 BLOOD CULTURE FOR BACTERIA: CPT | Performed by: PHYSICIAN ASSISTANT

## 2023-02-14 PROCEDURE — 83735 ASSAY OF MAGNESIUM: CPT | Performed by: INTERNAL MEDICINE

## 2023-02-14 PROCEDURE — 94640 AIRWAY INHALATION TREATMENT: CPT

## 2023-02-14 PROCEDURE — 87507 IADNA-DNA/RNA PROBE TQ 12-25: CPT | Performed by: PHYSICIAN ASSISTANT

## 2023-02-14 PROCEDURE — 36415 COLL VENOUS BLD VENIPUNCTURE: CPT

## 2023-02-14 PROCEDURE — 25010000002 MAGNESIUM SULFATE IN D5W 1G/100ML (PREMIX) 1-5 GM/100ML-% SOLUTION: Performed by: INTERNAL MEDICINE

## 2023-02-14 PROCEDURE — 87086 URINE CULTURE/COLONY COUNT: CPT | Performed by: PHYSICIAN ASSISTANT

## 2023-02-14 PROCEDURE — 83605 ASSAY OF LACTIC ACID: CPT | Performed by: PHYSICIAN ASSISTANT

## 2023-02-14 PROCEDURE — 96366 THER/PROPH/DIAG IV INF ADDON: CPT

## 2023-02-14 PROCEDURE — 93005 ELECTROCARDIOGRAM TRACING: CPT | Performed by: STUDENT IN AN ORGANIZED HEALTH CARE EDUCATION/TRAINING PROGRAM

## 2023-02-14 PROCEDURE — 94799 UNLISTED PULMONARY SVC/PX: CPT

## 2023-02-14 PROCEDURE — 87637 SARSCOV2&INF A&B&RSV AMP PRB: CPT | Performed by: PHYSICIAN ASSISTANT

## 2023-02-14 RX ORDER — ACETAMINOPHEN 500 MG
TABLET ORAL
Status: ACTIVE
Start: 2023-02-14 | End: 2023-02-14

## 2023-02-14 RX ORDER — SODIUM CHLORIDE 9 MG/ML
75 INJECTION, SOLUTION INTRAVENOUS CONTINUOUS
Status: CANCELLED | OUTPATIENT
Start: 2023-02-14 | End: 2023-02-14

## 2023-02-14 RX ORDER — IPRATROPIUM BROMIDE AND ALBUTEROL SULFATE 2.5; .5 MG/3ML; MG/3ML
3 SOLUTION RESPIRATORY (INHALATION) EVERY 4 HOURS PRN
Status: DISCONTINUED | OUTPATIENT
Start: 2023-02-14 | End: 2023-02-15 | Stop reason: HOSPADM

## 2023-02-14 RX ORDER — ACETAMINOPHEN 500 MG
1000 TABLET ORAL ONCE
Status: COMPLETED | OUTPATIENT
Start: 2023-02-14 | End: 2023-02-14

## 2023-02-14 RX ORDER — FAMOTIDINE 10 MG/ML
INJECTION, SOLUTION INTRAVENOUS
Status: ACTIVE
Start: 2023-02-14 | End: 2023-02-14

## 2023-02-14 RX ORDER — LEVOTHYROXINE SODIUM 0.07 MG/1
75 TABLET ORAL
Status: DISCONTINUED | OUTPATIENT
Start: 2023-02-14 | End: 2023-02-15 | Stop reason: HOSPADM

## 2023-02-14 RX ORDER — AMLODIPINE BESYLATE 2.5 MG/1
2.5 TABLET ORAL
Status: DISCONTINUED | OUTPATIENT
Start: 2023-02-14 | End: 2023-02-15 | Stop reason: HOSPADM

## 2023-02-14 RX ORDER — SODIUM CHLORIDE 0.9 % (FLUSH) 0.9 %
10 SYRINGE (ML) INJECTION AS NEEDED
Status: DISCONTINUED | OUTPATIENT
Start: 2023-02-14 | End: 2023-02-15 | Stop reason: HOSPADM

## 2023-02-14 RX ORDER — FAMOTIDINE 10 MG/ML
20 INJECTION, SOLUTION INTRAVENOUS ONCE
Status: COMPLETED | OUTPATIENT
Start: 2023-02-14 | End: 2023-02-14

## 2023-02-14 RX ORDER — MAGNESIUM SULFATE 1 G/100ML
1 INJECTION INTRAVENOUS
Status: COMPLETED | OUTPATIENT
Start: 2023-02-14 | End: 2023-02-14

## 2023-02-14 RX ORDER — BUDESONIDE AND FORMOTEROL FUMARATE DIHYDRATE 160; 4.5 UG/1; UG/1
2 AEROSOL RESPIRATORY (INHALATION)
Status: DISCONTINUED | OUTPATIENT
Start: 2023-02-14 | End: 2023-02-15 | Stop reason: HOSPADM

## 2023-02-14 RX ORDER — SODIUM CHLORIDE, SODIUM LACTATE, POTASSIUM CHLORIDE, CALCIUM CHLORIDE 600; 310; 30; 20 MG/100ML; MG/100ML; MG/100ML; MG/100ML
75 INJECTION, SOLUTION INTRAVENOUS CONTINUOUS
Status: DISCONTINUED | OUTPATIENT
Start: 2023-02-14 | End: 2023-02-15

## 2023-02-14 RX ORDER — ONDANSETRON 2 MG/ML
4 INJECTION INTRAMUSCULAR; INTRAVENOUS EVERY 6 HOURS PRN
Status: DISCONTINUED | OUTPATIENT
Start: 2023-02-14 | End: 2023-02-15 | Stop reason: HOSPADM

## 2023-02-14 RX ADMIN — SODIUM CHLORIDE, POTASSIUM CHLORIDE, SODIUM LACTATE AND CALCIUM CHLORIDE 75 ML/HR: 600; 310; 30; 20 INJECTION, SOLUTION INTRAVENOUS at 10:28

## 2023-02-14 RX ADMIN — SODIUM CHLORIDE, POTASSIUM CHLORIDE, SODIUM LACTATE AND CALCIUM CHLORIDE 1000 ML: 600; 310; 30; 20 INJECTION, SOLUTION INTRAVENOUS at 07:35

## 2023-02-14 RX ADMIN — TAZOBACTAM SODIUM AND PIPERACILLIN SODIUM 3.38 G: 375; 3 INJECTION, SOLUTION INTRAVENOUS at 05:55

## 2023-02-14 RX ADMIN — MAGNESIUM SULFATE HEPTAHYDRATE 1 G: 1 INJECTION, SOLUTION INTRAVENOUS at 17:03

## 2023-02-14 RX ADMIN — MAGNESIUM SULFATE HEPTAHYDRATE 1 G: 1 INJECTION, SOLUTION INTRAVENOUS at 14:24

## 2023-02-14 RX ADMIN — BUDESONIDE AND FORMOTEROL FUMARATE DIHYDRATE 2 PUFF: 160; 4.5 AEROSOL RESPIRATORY (INHALATION) at 21:24

## 2023-02-14 RX ADMIN — AMLODIPINE BESYLATE 2.5 MG: 2.5 TABLET ORAL at 10:20

## 2023-02-14 RX ADMIN — IOPAMIDOL 85 ML: 612 INJECTION, SOLUTION INTRAVENOUS at 01:47

## 2023-02-14 RX ADMIN — ACETAMINOPHEN 1000 MG: 500 TABLET ORAL at 03:07

## 2023-02-14 RX ADMIN — MAGNESIUM SULFATE HEPTAHYDRATE 1 G: 1 INJECTION, SOLUTION INTRAVENOUS at 15:33

## 2023-02-14 RX ADMIN — SODIUM CHLORIDE 1000 ML: 9 INJECTION, SOLUTION INTRAVENOUS at 00:37

## 2023-02-14 RX ADMIN — FAMOTIDINE 20 MG: 10 INJECTION INTRAVENOUS at 03:07

## 2023-02-14 RX ADMIN — LEVOTHYROXINE SODIUM 75 MCG: 0.07 TABLET ORAL at 10:20

## 2023-02-14 NOTE — CONSULTS
[facility] [place] [facility] [place]    Referring Provider: Em ASTORGA/Dr. Hewitt    PCP: Tejinder Masters MD    Reason for Consultation: Bloody diarrhea    Chief complaint: nausea, vomiting, diarrhea, abdominal pain    History of present illness:    Gee Whitney is a 72 y.o. male with History of coronary artery disease, hypertension, dyslipidemia, hypothyroidism, COPD who presented to the emergency department due to nausea, vomiting, diarrhea, abdominal pain that began approximately 5 days ago. He is admitted due to suspected acute gastroenteritis, leukocytosis, mild acute kidney injury, CT changes suggestive of ileus or small bowel enteritis more likely than partial obstruction.  NG tube placed in the emergency department. He has throat discomfort and has experienced gagging likely due to NG tube    He reports that 5 days ago he attempted to have a bowel movement with initial straining and inability to have a bowel movement then experienced diarrhea followed by vomiting for 2 days.  3 days ago symptoms improved but yesterday he developed headache followed by diarrhea and vomiting for which he called EMS to transport him to the hospital.    His daughter, orthopedic nurse at [facility], is at the bedside during consultation.    He denies fever, no known exposure to illness.    He reports left mid to left lower quadrant abdominal discomfort with gurgling noise from the abdomen that has improved.    He has noticed blood in diarrhea. Occult stool positive.  2/14/2023 GI stool panel negative      He had decreased intake of fluids 4 and 5 days ago due to symptoms, reports improvement in fluid intake over the weekend and decreased intake since symptoms returned last night.     Dr. Villaseñor, general surgeon consultation regarding concern for partial small bowel obstruction with recommendations for serial abdominal exams, NG to suction, IV volume resuscitation, suggest GI evaluation due to bloody  diarrhea.      Prior colonoscopy per Dr. Hernández Per patient approximately 6 to 8 years ago.  Possible history of colon polyps.    He takes a stool softener daily. He usually has a bowel movement 2 times per week, approximately every 3 to 4 days.     Allergies:  Patient has no known allergies.    Scheduled Meds:  amLODIPine, 2.5 mg, Oral, Q24H  budesonide-formoterol, 2 puff, Inhalation, BID - RT  levothyroxine, 75 mcg, Oral, Q AM  magnesium sulfate, 1 g, Intravenous, Q1H       Infusions:  lactated ringers, 75 mL/hr, Last Rate: 75 mL/hr (02/14/23 1028)      PRN Meds:  •  ipratropium-albuterol  •  Magnesium Low Dose Replacement - Initiate Nurse / BPA Driven Protocol  •  ondansetron  •  phenol  •  Potassium Replacement - Initiate Nurse / BPA Driven Protocol  •  sodium chloride    Home Meds:  Medications Prior to Admission   Medication Sig Dispense Refill Last Dose   • albuterol sulfate  (90 Base) MCG/ACT inhaler albuterol sulfate HFA 90 mcg/actuation aerosol inhaler      • amLODIPine (NORVASC) 2.5 MG tablet Take 2.5 mg by mouth Daily.      • aspirin  MG tablet Take 325 mg by mouth Daily.      • atorvastatin (LIPITOR) 40 MG tablet Take 40 mg by mouth Every Night.      • cholecalciferol (VITAMIN D3) 1000 UNITS tablet Take 5,000 Units by mouth Daily.      • Docusate Calcium (STOOL SOFTENER PO) Take 1 tablet by mouth Daily. 100mg      • ipratropium (ATROVENT) 0.06 % nasal spray 2 sprays into the nostril(s) as directed by provider 3 (Three) Times a Day.      • levothyroxine (SYNTHROID, LEVOTHROID) 75 MCG tablet Take 75 mcg by mouth Daily.      • lisinopril (PRINIVIL,ZESTRIL) 40 MG tablet Take 40 mg by mouth Daily.      • Multiple Vitamins-Minerals (ICAPS AREDS 2 PO) Take  by mouth.      • Multiple Vitamins-Minerals (MULTIVITAMIN ADULT PO) Take 1 tablet by mouth Daily.      • nitroglycerin (NITROSTAT) 0.4 MG SL tablet Place 0.4 mg under the tongue Every 5 (Five) Minutes As Needed.      • Omega-3 Fatty Acids (FISH  OIL) 1200 MG capsule delayed-release Take 1,200 mg by mouth Daily.      • tiotropium bromide-olodaterol (Stiolto Respimat) 2.5-2.5 MCG/ACT aerosol solution inhaler Inhale 2 puffs Daily. 1 each 6    • traZODone (DESYREL) 50 MG tablet Take 1 tablet by mouth Every Night.          ROS: Review of Systems   Constitutional: Positive for appetite change. Negative for chills, fatigue and fever.   HENT: Negative for ear discharge and ear pain.    Eyes: Negative for pain and discharge.   Respiratory: Negative for cough and shortness of breath.    Cardiovascular: Negative for chest pain and palpitations.   Gastrointestinal:        As in HPI   Endocrine: Negative for polydipsia, polyphagia and polyuria.   Genitourinary: Negative for dysuria and frequency.   Musculoskeletal: Negative for back pain and gait problem.   Skin: Negative for color change and rash.   Neurological: Negative for dizziness, syncope and headaches.   Hematological: Does not bruise/bleed easily.       PAST MED HX:  Past Medical History:   Diagnosis Date   • Disease of thyroid gland    • Hyperlipidemia    • Hypertension    • Myocardial infarction (HCC)        PAST SURG HX:  Past Surgical History:   Procedure Laterality Date   • BRONCHOSCOPY     • HERNIA REPAIR     • TENDON RELEASE      Left thumb        FAM HX:  Family History   Problem Relation Age of Onset   • Heart failure Mother    • Other Father 28         in car accident   • Heart attack Sister    • Mitral valve prolapse Sister    • Asthma Brother    • Stroke Brother    • Hypertension Child        SOC HX:  Social History     Socioeconomic History   • Marital status:    • Number of children: 2   Tobacco Use   • Smoking status: Former     Packs/day: 1.00     Years: 10.00     Pack years: 10.00     Types: Cigarettes     Quit date:      Years since quittin.1   • Smokeless tobacco: Never   Vaping Use   • Vaping Use: Never used   Substance and Sexual Activity   • Alcohol use: Not Currently  "    Comment: occassionally    • Drug use: No   • Sexual activity: Defer       PHYSICAL EXAM  /73 (BP Location: Right arm, Patient Position: Lying)   Pulse 85   Temp 98.6 °F (37 °C) (Oral)   Resp 18   Ht 175.3 cm (69\")   Wt 74.8 kg (165 lb)   SpO2 94%   BMI 24.37 kg/m²   Wt Readings from Last 3 Encounters:   02/14/23 74.8 kg (165 lb)   01/20/23 74.6 kg (164 lb 8 oz)   05/09/22 78 kg (172 lb)   ,body mass index is 24.37 kg/m².  Physical Exam  Exam conducted with a chaperone present.   Constitutional:       General: He is not in acute distress.     Appearance: He is not toxic-appearing.   HENT:      Head: Normocephalic and atraumatic. No contusion.      Right Ear: External ear normal.      Left Ear: External ear normal.      Nose:      Comments: NG secured in place in right nostril  Eyes:      General: Lids are normal. No scleral icterus.        Right eye: No discharge.         Left eye: No discharge.      Extraocular Movements: Extraocular movements intact.   Neck:      Trachea: Trachea normal.      Comments: No visible mass  No visible adenopathy  Cardiovascular:      Rate and Rhythm: Normal rate.      Heart sounds: Normal heart sounds.   Pulmonary:      Effort: No respiratory distress.      Comments: Symmetrical expansion    Abdominal:      General: Bowel sounds are decreased.      Palpations: Abdomen is soft. There is no mass.      Tenderness: There is abdominal tenderness.      Comments: Left mid to LLQ of abdomen  Approximate 200 mL of pudding like brown stool with some red appearance indicating blood noted in measuring container in toilet in patient's room.    Genitourinary:     Rectum: Guaiac result positive.      Comments: No obvious fissure but report of perianal discomfort with exam. Hemorrhoidal tag noted.  CAROL deferred. Light brown stool from perianal area noted on glove after exam  Musculoskeletal:      Right lower leg: No edema.      Left lower leg: No edema.      Comments: Symmetrical " movement of upper extremities  Symmetrical movement of lower extremities  No visible deformities   Skin:     General: Skin is warm and dry.      Coloration: Skin is not jaundiced.   Neurological:      General: No focal deficit present.      Mental Status: He is alert and oriented to person, place, and time.   Psychiatric:         Mood and Affect: Mood normal.         Behavior: Behavior normal.         Thought Content: Thought content normal.     Results Review:   I reviewed the patient's new clinical results.  Component  Ref Range & Units 09:16   Fecal Occult Blood  Negative Positive Abnormal        Lab Results   Component Value Date    WBC 13.43 (H) 02/14/2023    HGB 15.8 02/14/2023    HGB 16.8 02/14/2023    HGB 12.9 (L) 03/12/2018    HCT 46.7 02/14/2023    MCV 92.7 02/14/2023     02/14/2023       Lab Results   Component Value Date    INR 0.98 03/08/2018       Lab Results   Component Value Date    GLUCOSE 119 (H) 02/14/2023    BUN 40 (H) 02/14/2023    CREATININE 1.27 02/14/2023    EGFRIFNONA 60 (L) 03/12/2018    BCR 31.5 (H) 02/14/2023     02/14/2023    K 4.6 02/14/2023    CO2 19.0 (L) 02/14/2023    CALCIUM 8.2 (L) 02/14/2023    PROTENTOTREF 6.9 02/23/2018    ALBUMIN 4.0 02/14/2023    ALKPHOS 72 02/14/2023    BILITOT 0.3 02/14/2023    BILIDIR 0.15 02/23/2018    ALT 12 02/14/2023    AST 21 02/14/2023       CT Abdomen Pelvis With Contrast    Result Date: 2/14/2023  CT ABDOMEN PELVIS W CONTRAST Date of Exam: 2/14/2023 1:38 AM EST Indication: abdominal pain, nausea, vomiting, diarrhea. Comparison: None available. Technique: Axial CT images were obtained of the abdomen and pelvis following the uneventful intravenous administration of 85 cc Isovue-300. Reconstructed coronal and sagittal images were also obtained. Automated exposure control and iterative construction methods were used. Findings: Lower Chest: Atelectasis and tiny granuloma in the left lower lobe. Fat-containing Bochdalek hernia on the right.  Small hiatal hernia Organs: Liver, spleen, pancreas, adrenal glands unremarkable. Numerous bilateral renal cysts. Unremarkable gallbladder Gastrointestinal: Mild diffuse small bowel fluid distention. No transition to clearly decompressed small bowel. No small bowel wall thickening. Fluid throughout the colon. No colonic wall thickening. Pelvis: No abnormal fluid collection. Enlarged prostate. Urinary bladder grossly unremarkable Peritoneum/Retroperitoneum: No ascites or pneumoperitoneum. Normal caliber aorta Bones/Soft Tissues: No acute bony abnormality     Findings are most compatible with nonspecific gastroenteritis Electronically Signed: Gee Alicea  2/14/2023 10:07 AM EST  Workstation ID: [id number]    XR Abdomen KUB    Result Date: 2/14/2023  EXAMINATION: XR ABDOMEN KUB  DATE OF EXAM: 2/14/2023 5:12 AM HISTORY: confirm NG tube placement COMPARISON: None. FINDINGS: Nasogastric tube tip is in the proximal stomach. Bowel gas pattern is nonobstructive. Lung bases are clear. Cardiomediastinal silhouette is normal. Upper abdomen is normal.     Nasogastric tube tip is in the stomach. Electronically signed by:  Chetan Merritt M.D.  2/14/2023 3:43 AM Mountain Time      COVID19   Date Value Ref Range Status   02/14/2023 Not Detected Not Detected - Ref. Range Final       Urine Culture   Date Value Ref Range Status   02/14/2023 Culture in progress  Preliminary       ASSESSMENTS/PLANS  Gee Whitney is a 72 y.o. male with  1. Abdominal pain  - suspect likely improving viral gastroenteritis, possible constipation  - improvement with NG   - consider treating constipation as below if needed in the future  - monitor with advancing diet to liquid only diet  2. Diarrhea with blood present, + occult stool  - suspect likely viral gastroenteritis with possible differentials/co-existing conditions of ischemic colitis, constipation  - IV hydration (continue to monitor BUN, if worsening BUN with continued blood in stool,  consider colonoscopy)  - monitor and measure stool output in an attempt to determine if he is experiencing increased, stable or decreased output as well as in attempt to determine if he is experiencing continued blood in stool or if it is improving (currently seems as though blood per rectum is improving)  - GI stool panel negative  - If worsening diarrhea with or without blood present, consider colonoscopy. I would like for NG to remain in place at least this evening in the event symptoms become worse and there needs to be consideration for bowel prep via NG tube but also understand NG is irritating and possibly contributing to gagging and may need to be removed to determine if he is able to tolerate liquids  - electrolyte replacement per protocol  - perianal care recommended due to discomfort during exam without obvious fissure  3. History of vomiting  - possibly due to viral gastroenteritis, constipation could contribute to vomiting as well  - NG tube in place with improvement in vomiting  -  anti-emetics as needed  - continue IV hydration  - suggest trial of liquid only diet while NG tube is in place. If he is unable to tolerate liquid diet due to NG tube contributing to gagging or other irritation, consider removal of NG tube. I would like for NG tube to remain in place until KUB is performed this evening for re-evaluation due to prior concern for ileus, as well as to determine if he can tolerate liquids. If he is unable to tolerate liquids due to vomiting (without irration or gagging from NG) or diarrhea becomes worse with intake of liquids, I would like for NG to remain in place in the event a bowel prep needs to be given via NG tube but if NG tube is bothersome and making patient worse, consider removal.   - electrolyte replacement per protocol  4. Constipation  - suspect he likely has underlying constipation due to report of use of stool softener daily as an outpatient with bowel movement every 3 to 4 days  for which I recommend he consider Miralax 1 to 3 times daily as an outpatient if he continues to skip days without a bowel movement. He may have experienced diarrhea due to constipation and/or diarrhea due to viral gastroenteritis.     He recently started amlodipine, unsure if amlodipine is contributing to symptoms, he is continuing amlodipine while in the hospital, could consider trial off amlodipine if symptoms aren't improving.      I discussed the patient's findings and my recommendations with patient, family and nursing staff    GAURI Gan  02/14/23  14:13 EST

## 2023-02-14 NOTE — CONSULTS
General Surgery Consultation Note    Date of Service: 2/14/2023  Gee Whitney  [id number]  1950      Referring Provider: Chetan Ventura MD    Location of Consult: Inpatient     Reason for Consultation: Partial small bowel obstruction       History of Present Illness:  I am seeing, Gee Whitney, in consultation for Chetan Ventura MD regarding partial small bowel obstruction.  72-year-old gentleman with past medical history significant for hypertension, and coronary artery disease presents with approximately a 4-day history of nausea, vomiting (nonbilious and nonbloody), diarrhea, and abdominal discomfort.  He no longer complains of abdominal pain today.  He is having profuse diarrhea with blood.  He has had no symptoms similar to this in the past.  He denies fever or chills.  Otherwise there are no significant modifying factors or associated symptoms.  Upon work-up in the emergency room he had a leukocytosis of 16.4 k and a CT scan consistent with most likely ileus.  He continues to have diarrhea.    Problems Addressed this Visit        Gastrointestinal Abdominal     Acute gastroenteritis - Primary   Other Visit Diagnoses     Nausea and vomiting, unspecified vomiting type        Diarrhea of presumed infectious origin        Pain of upper abdomen        History of hypertension        History of hyperlipidemia        History of myocardial infarction        History of coronary artery disease          Diagnoses       Codes Comments    Acute gastroenteritis    -  Primary ICD-10-CM: K52.9  ICD-9-CM: 558.9     Nausea and vomiting, unspecified vomiting type     ICD-10-CM: R11.2  ICD-9-CM: 787.01     Diarrhea of presumed infectious origin     ICD-10-CM: R19.7  ICD-9-CM: 009.3     Pain of upper abdomen     ICD-10-CM: R10.10  ICD-9-CM: 789.09     History of hypertension     ICD-10-CM: Z86.79  ICD-9-CM: V12.59     History of hyperlipidemia     ICD-10-CM: Z86.39  ICD-9-CM: V12.29     History of  myocardial infarction     ICD-10-CM: I25.2  ICD-9-CM: 412     History of coronary artery disease     ICD-10-CM: Z86.79  ICD-9-CM: V12.59           Past Medical History:   Diagnosis Date   • Disease of thyroid gland    • Hyperlipidemia    • Hypertension    • Myocardial infarction (HCC)        Past Surgical History:   • BRONCHOSCOPY   • HERNIA REPAIR   • TENDON RELEASE    Left thumb        No Known Allergies    No current facility-administered medications on file prior to encounter.     Current Outpatient Medications on File Prior to Encounter   Medication Sig Dispense Refill   • albuterol sulfate  (90 Base) MCG/ACT inhaler albuterol sulfate HFA 90 mcg/actuation aerosol inhaler     • amLODIPine (NORVASC) 2.5 MG tablet Take 2.5 mg by mouth Daily.     • aspirin  MG tablet Take 325 mg by mouth Daily.     • atorvastatin (LIPITOR) 40 MG tablet Take 40 mg by mouth Every Night.     • cholecalciferol (VITAMIN D3) 1000 UNITS tablet Take 5,000 Units by mouth Daily.     • Docusate Calcium (STOOL SOFTENER PO) Take 1 tablet by mouth Daily. 100mg     • ipratropium (ATROVENT) 0.06 % nasal spray 2 sprays into the nostril(s) as directed by provider 3 (Three) Times a Day.     • levothyroxine (SYNTHROID, LEVOTHROID) 75 MCG tablet Take 75 mcg by mouth Daily.     • lisinopril (PRINIVIL,ZESTRIL) 40 MG tablet Take 40 mg by mouth Daily.     • Multiple Vitamins-Minerals (ICAPS AREDS 2 PO) Take  by mouth.     • Multiple Vitamins-Minerals (MULTIVITAMIN ADULT PO) Take 1 tablet by mouth Daily.     • nitroglycerin (NITROSTAT) 0.4 MG SL tablet Place 0.4 mg under the tongue Every 5 (Five) Minutes As Needed.     • Omega-3 Fatty Acids (FISH OIL) 1200 MG capsule delayed-release Take 1,200 mg by mouth Daily.     • tiotropium bromide-olodaterol (Stiolto Respimat) 2.5-2.5 MCG/ACT aerosol solution inhaler Inhale 2 puffs Daily. 1 each 6   • traZODone (DESYREL) 50 MG tablet Take 1 tablet by mouth Every Night.           Current  Facility-Administered Medications:   •  amLODIPine (NORVASC) tablet 2.5 mg, 2.5 mg, Oral, Q24H, Chetan Ventura MD, 2.5 mg at 23 1020  •  budesonide-formoterol (SYMBICORT) 160-4.5 MCG/ACT inhaler 2 puff, 2 puff, Inhalation, BID - RT, Chetan Ventura MD  •  ipratropium-albuterol (DUO-NEB) nebulizer solution 3 mL, 3 mL, Nebulization, Q4H PRN, Chetan Ventura MD  •  lactated ringers infusion, 75 mL/hr, Intravenous, Continuous, Chetan Ventura MD, Last Rate: 75 mL/hr at 23 1028, 75 mL/hr at 23 1028  •  levothyroxine (SYNTHROID, LEVOTHROID) tablet 75 mcg, 75 mcg, Oral, Q AM, Chetan Ventura MD, 75 mcg at 23 1020  •  Magnesium Low Dose Replacement - Initiate Nurse / BPA Driven Protocol, , Does not apply, PRN, Chetan Ventura MD  •  magnesium sulfate in D5W 1g/100mL (PREMIX), 1 g, Intravenous, Q1H, Chetan Ventura MD  •  ondansetron (ZOFRAN) injection 4 mg, 4 mg, Intravenous, Q6H PRN, Chetan Ventura MD  •  phenol (CHLORASEPTIC) 1.4 % liquid 2 spray, 2 spray, Mouth/Throat, Q2H PRN, Em Brewer APRN  •  Potassium Replacement - Initiate Nurse / BPA Driven Protocol, , Does not apply, PRN, Chetan Ventura MD  •  sodium chloride 0.9 % flush 10 mL, 10 mL, Intravenous, PRN, Chetan Wooten MD    Family History   Problem Relation Age of Onset   • Heart failure Mother    • Other Father 28         in car accident   • Heart attack Sister    • Mitral valve prolapse Sister    • Asthma Brother    • Stroke Brother    • Hypertension Child      Social History     Socioeconomic History   • Marital status:    • Number of children: 2   Tobacco Use   • Smoking status: Former     Packs/day: 1.00     Years: 10.00     Pack years: 10.00     Types: Cigarettes     Quit date:      Years since quittin.1   • Smokeless tobacco: Never   Vaping Use   • Vaping Use: Never used   Substance and Sexual Activity   • Alcohol use: Not Currently     Comment:  "occassionally    • Drug use: No   • Sexual activity: Defer       Review of Systems:  Review of Systems   Constitutional: Positive for appetite change. Negative for fever.   HENT: Negative for dental problem, ear pain and mouth sores.    Eyes: Negative for photophobia and visual disturbance.   Respiratory: Negative for cough, chest tightness and stridor.    Cardiovascular: Negative for chest pain and leg swelling.   Gastrointestinal: Positive for abdominal pain (Resolved), blood in stool, diarrhea, nausea and vomiting.   Endocrine: Negative for polyphagia and polyuria.   Genitourinary: Negative for dysuria, hematuria and urgency.   Musculoskeletal: Negative for back pain, joint swelling and neck pain.   Skin: Negative for pallor and rash.   Allergic/Immunologic: Negative for immunocompromised state.   Neurological: Negative for tremors, seizures and facial asymmetry.   Hematological: Negative for adenopathy.   Psychiatric/Behavioral: Negative for agitation, dysphoric mood and self-injury.     Otherwise the 12 point review of systems is negative.    /73 (BP Location: Right arm, Patient Position: Lying)   Pulse 98   Temp 98.6 °F (37 °C) (Oral)   Resp 18   Ht 175.3 cm (69\")   Wt 74.8 kg (165 lb)   SpO2 94%   BMI 24.37 kg/m²   Body mass index is 24.37 kg/m².    General: Mild distress  HEENT: PER, no icterus, normal sclerae  Cardiac: regular rhythm,  no audible rubs  Pulmonary: bilateral breath sounds, nonlabored  Abdominal: Soft, minimal tenderness, no generalized peritonitis, no palpable hepatosplenomegaly  Neurologic: awake, alert, no obvious focal deficits  Extremities: warm, no edema  Skin: no obvious rashes nor worrisome lesions seen     CBC  Results from last 7 days   Lab Units 02/14/23  0914   WBC 10*3/mm3 13.43*   HEMOGLOBIN g/dL 15.8   HEMATOCRIT % 46.7   PLATELETS 10*3/mm3 203       CMP  Results from last 7 days   Lab Units 02/14/23  0914 02/14/23  0036   SODIUM mmol/L 137 138   POTASSIUM mmol/L " 4.6 4.9   CHLORIDE mmol/L 106 103   CO2 mmol/L 19.0* 21.0*   BUN mg/dL 40* 39*   CREATININE mg/dL 1.27 1.37*   CALCIUM mg/dL 8.2* 9.2   BILIRUBIN mg/dL  --  0.3   ALK PHOS U/L  --  72   ALT (SGPT) U/L  --  12   AST (SGOT) U/L  --  21   GLUCOSE mg/dL 119* 164*       Radiology  Imaging Results (Last 72 Hours)     Procedure Component Value Units Date/Time    CT Abdomen Pelvis With Contrast [[id number]] Collected: 02/14/23 0959     Updated: 02/14/23 1009    Narrative:      CT ABDOMEN PELVIS W CONTRAST    Date of Exam: 2/14/2023 1:38 AM EST    Indication: abdominal pain, nausea, vomiting, diarrhea.    Comparison: None available.    Technique: Axial CT images were obtained of the abdomen and pelvis following the uneventful intravenous administration of 85 cc Isovue-300. Reconstructed coronal and sagittal images were also obtained. Automated exposure control and iterative   construction methods were used.    Findings:    Lower Chest: Atelectasis and tiny granuloma in the left lower lobe. Fat-containing Bochdalek hernia on the right. Small hiatal hernia    Organs: Liver, spleen, pancreas, adrenal glands unremarkable. Numerous bilateral renal cysts. Unremarkable gallbladder     Gastrointestinal: Mild diffuse small bowel fluid distention. No transition to clearly decompressed small bowel. No small bowel wall thickening. Fluid throughout the colon. No colonic wall thickening.     Pelvis: No abnormal fluid collection. Enlarged prostate. Urinary bladder grossly unremarkable    Peritoneum/Retroperitoneum: No ascites or pneumoperitoneum. Normal caliber aorta    Bones/Soft Tissues: No acute bony abnormality      Impression:      Findings are most compatible with nonspecific gastroenteritis        Electronically Signed: Gee Alicea    2/14/2023 10:07 AM EST    Workstation ID: [id number]    XR Abdomen KUB [[id number]] Collected: 02/14/23 0342     Updated: 02/14/23 0544    Narrative:      EXAMINATION: XR ABDOMEN KUB      DATE OF EXAM:  2/14/2023 5:12 AM    HISTORY: confirm NG tube placement    COMPARISON: None.    FINDINGS:     Nasogastric tube tip is in the proximal stomach. Bowel gas pattern is nonobstructive. Lung bases are clear. Cardiomediastinal silhouette is normal. Upper abdomen is normal.        Impression:      Nasogastric tube tip is in the stomach.     Electronically signed by:  Chetan Merritt M.D.    2/14/2023 3:43 AM Mountain Time            Assessment:  Gastroenteritis with an adynamic ileus  Bloody diarrhea    Plan:  Continue nasogastric suction, IV volume resuscitation, and serial abdominal exams.  I reviewed his CT imaging personally.  I do not think that he will require any acute surgical intervention.  Would suggest GI evaluation secondary to bloody diarrhea.  GI PCR noted.  Supportive care for now.      Marko Villaseñor MD  02/14/23  12:40 EST

## 2023-02-14 NOTE — H&P
[facility] Medicine Services  HISTORY AND PHYSICAL    Patient Name: Gee Whitney  : 1950  MRN: [id number]  Primary Care Physician: Tejinder Masters MD  Date of admission: 2023    Subjective   Subjective     Chief Complaint:  N/V/D    HPI:  Gee Whitney is a 72 y.o. male with PMH of CAD, HTN, HLD, COPD and hypothyroidism presents to the ED for N/V/D. On Thursday, patient started having diarrhea. On Friday, patient started having nausea and vomiting. He has associated dull, constant upper abdominal pain. His abdomen has been distended. He states he has still been able to eat and drink. He has chills, but no documented fever. He has not been able to stop vomiting today, so he called EMS. He did have some stools with bright red blood, history of hemorrhoids. He has a history of inguinal hernia repair, no other abdominal surgeries. Denies chest pain, shortness of breath, dysuria.         Review of Systems   Constitutional: Positive for chills. Negative for appetite change.   HENT: Negative for congestion, sore throat and trouble swallowing.    Eyes: Negative.    Respiratory: Negative for cough, chest tightness and shortness of breath.    Cardiovascular: Negative for chest pain and leg swelling.   Gastrointestinal: Positive for abdominal distention, abdominal pain, blood in stool, diarrhea, nausea and vomiting.   Endocrine: Negative.    Genitourinary: Negative for difficulty urinating, dysuria, hematuria and urgency.   Musculoskeletal: Negative.    Skin: Negative.    Neurological: Negative for dizziness and headaches.   Hematological: Negative.    Psychiatric/Behavioral: Negative.  Negative for agitation and confusion.      All other systems reviewed and are negative.     Personal History     Past Medical History:   Diagnosis Date   • Disease of thyroid gland    • Hyperlipidemia    • Hypertension    • Myocardial infarction (HCC)              Past Surgical History:    Procedure Laterality Date   • BRONCHOSCOPY     • HERNIA REPAIR     • TENDON RELEASE      Left thumb        Family History:  family history includes Asthma in his brother; Heart attack in his sister; Heart failure in his mother; Hypertension in his child; Mitral valve prolapse in his sister; Other (age of onset: 28) in his father; Stroke in his brother. Otherwise pertinent FHx was reviewed and unremarkable.     Social History:  reports that he quit smoking about 46 years ago. His smoking use included cigarettes. He has a 10.00 pack-year smoking history. He has never used smokeless tobacco. He reports that he does not currently use alcohol. He reports that he does not use drugs.  Social History     Social History Narrative   • Not on file       Medications:  Docusate Calcium, Fish Oil, Multiple Vitamins-Minerals, albuterol sulfate HFA, amLODIPine, aspirin EC, atorvastatin, cholecalciferol, ipratropium, levothyroxine, lisinopril, multivitamin with minerals, nitroglycerin, tiotropium bromide-olodaterol, and traZODone    No Known Allergies    Objective   Objective     Vital Signs:   Temp:  [97.9 °F (36.6 °C)] 97.9 °F (36.6 °C)  Heart Rate:  [80-85] 80  Resp:  [18] 18  BP: (145-158)/(69-86) 145/75    Physical Exam  Constitutional:       Appearance: He is normal weight. He is ill-appearing.   HENT:      Head: Normocephalic.      Nose: Nose normal. No congestion.      Mouth/Throat:      Mouth: Mucous membranes are moist.   Eyes:      Extraocular Movements: Extraocular movements intact.      Pupils: Pupils are equal, round, and reactive to light.   Cardiovascular:      Rate and Rhythm: Regular rhythm. Tachycardia present.      Pulses: Normal pulses.      Heart sounds: Normal heart sounds. No murmur heard.  Pulmonary:      Effort: Pulmonary effort is normal. No respiratory distress.      Breath sounds: Normal breath sounds. No wheezing or rhonchi.   Abdominal:      General: Bowel sounds are decreased. There is distension.       Tenderness: There is abdominal tenderness.   Musculoskeletal:         General: No swelling. Normal range of motion.      Cervical back: Normal range of motion. No rigidity.   Skin:     General: Skin is warm.      Capillary Refill: Capillary refill takes less than 2 seconds.   Neurological:      General: No focal deficit present.      Mental Status: He is alert and oriented to person, place, and time. Mental status is at baseline.      Motor: No weakness.   Psychiatric:         Mood and Affect: Mood normal.         Behavior: Behavior normal.         Thought Content: Thought content normal.         Judgment: Judgment normal.          Result Review:  I have personally reviewed the results from the time of this admission to 2/14/2023 04:44 EST and agree with these findings:  [x]  Laboratory list / accordion  [x]  Microbiology  [x]  Radiology  []  EKG/Telemetry   []  Cardiology/Vascular   []  Pathology  [x]  Old records  []  Other:  Most notable findings include:     LAB RESULTS:      Lab 02/14/23  0036   WBC 16.45*   HEMOGLOBIN 16.8   HEMATOCRIT 50.1   PLATELETS 220   NEUTROS ABS 14.68*   IMMATURE GRANS (ABS) 0.06*   LYMPHS ABS 0.59*   MONOS ABS 0.83   EOS ABS 0.25   MCV 91.8   LACTATE 1.5         Lab 02/14/23  0036   SODIUM 138   POTASSIUM 4.9   CHLORIDE 103   CO2 21.0*   ANION GAP 14.0   BUN 39*   CREATININE 1.37*   EGFR 54.1*   GLUCOSE 164*   CALCIUM 9.2         Lab 02/14/23  0036   TOTAL PROTEIN 6.6   ALBUMIN 4.0   GLOBULIN 2.6   ALT (SGPT) 12   AST (SGOT) 21   BILIRUBIN 0.3   ALK PHOS 72   LIPASE 30         Lab 02/14/23  0227 02/14/23  0036   HSTROP T 23* 33*                 Brief Urine Lab Results  (Last result in the past 365 days)      Color   Clarity   Blood   Leuk Est   Nitrite   Protein   CREAT   Urine HCG        02/14/23 0109 Dark Yellow   Clear   Negative   Small (1+)   Negative   100 mg/dL (2+)               Microbiology Results (last 10 days)     ** No results found for the last 240 hours. **           No radiology results from the last 24 hrs        Assessment & Plan   Assessment & Plan       Acute gastroenteritis    Coronary artery disease    Hypertension    Dyslipidemia    Hypothyroidism    Leukocytosis    DEB (acute kidney injury) (HCC)    Gee Whitney is a 72 y.o. male with PMH of CAD, HTN, HLD, COPD and hypothyroidism presents to the ED for N/V/D.    Abdominal Pain  Gastroenteritis vs Ileus vs Partial SBO  Leukocytosis  -CT abd/pelvis shows gastroenteritis with possible ileus   -NG tube  -Zosyn   -Maintenance fluids  -GI and COVID panel   -NPO  -Consult general surgery    ?UTI  -On Zoysn  -Urine culture pending    DEB  -Cr 1.37, baseline around 1  -Avoid nephrotoxic agents  -Strict I/Os  -Trend    COPD  -Duonebs PRN    HTN  CAD  HLD  -Hold home antihypertensives while NPO  -PRN hydralazine     Hypothyroidism   -Continue home synthroid    DVT prophylaxis:  SCDs    CODE STATUS:  FULL  Code Status (Patient has no pulse and is not breathing): CPR (Attempt to Resuscitate)  Medical Interventions (Patient has pulse or is breathing): Full Support  Release to patient: Routine Release      Expected Discharge  TBD      This note has been completed as part of a split-shared workflow.     Signature: Electronically signed by GAURI Lake, 02/14/23, 5:37 AM EST.    Total APC Time: 60 minutes    Seen and billed independently by APC

## 2023-02-14 NOTE — PLAN OF CARE
Goal Outcome Evaluation:  Plan of Care Reviewed With: patient        Progress: improving  Outcome Evaluation: patient admitted from ed with gastitis, NG tube in place, multiple stools, patient ambulates with stand by assist family at bedside  Problem: Adult Inpatient Plan of Care  Goal: Plan of Care Review  Outcome: Ongoing, Progressing  Flowsheets (Taken 2/14/2023 1617)  Progress: improving  Plan of Care Reviewed With: patient  Outcome Evaluation: patient admitted from ed with gastitis, NG tube in place, multiple stools, patient ambulates with stand by assist family at bedside  Goal: Patient-Specific Goal (Individualized)  Outcome: Ongoing, Progressing  Goal: Absence of Hospital-Acquired Illness or Injury  Outcome: Ongoing, Progressing  Intervention: Identify and Manage Fall Risk  Recent Flowsheet Documentation  Taken 2/14/2023 1600 by Mile Waters RN  Safety Promotion/Fall Prevention:   activity supervised   fall prevention program maintained   safety round/check completed  Taken 2/14/2023 1400 by Mile Waters RN  Safety Promotion/Fall Prevention:   activity supervised   fall prevention program maintained   safety round/check completed  Taken 2/14/2023 1200 by Mile Waters RN  Safety Promotion/Fall Prevention:   activity supervised   fall prevention program maintained   safety round/check completed  Taken 2/14/2023 1000 by Mile Waters RN  Safety Promotion/Fall Prevention:   fall prevention program maintained   safety round/check completed  Taken 2/14/2023 0930 by Mile Waters RN  Safety Promotion/Fall Prevention:   activity supervised   fall prevention program maintained  Intervention: Prevent Skin Injury  Recent Flowsheet Documentation  Taken 2/14/2023 1600 by Mile Waters RN  Body Position: supine, legs elevated  Taken 2/14/2023 1400 by Mile Waters RN  Body Position: supine, legs elevated  Taken 2/14/2023 1200 by Mile Waters RN  Body Position: supine, legs  elevated  Taken 2/14/2023 1000 by Mile Waters RN  Body Position:   upper extremity elevated   supine, legs elevated  Taken 2/14/2023 0930 by Mile Waters RN  Body Position: supine, legs elevated  Intervention: Prevent and Manage VTE (Venous Thromboembolism) Risk  Recent Flowsheet Documentation  Taken 2/14/2023 1600 by Mile Waters RN  Activity Management: activity adjusted per tolerance  Taken 2/14/2023 1400 by Mile Waters RN  Activity Management: activity adjusted per tolerance  Taken 2/14/2023 1200 by Mile Waters RN  Activity Management: activity adjusted per tolerance  Taken 2/14/2023 1000 by Mile Waters RN  Activity Management: activity adjusted per tolerance  Taken 2/14/2023 0930 by Mile Waters RN  Activity Management: activity adjusted per tolerance  VTE Prevention/Management:   bilateral   sequential compression devices on  Goal: Optimal Comfort and Wellbeing  Outcome: Ongoing, Progressing  Intervention: Monitor Pain and Promote Comfort  Recent Flowsheet Documentation  Taken 2/14/2023 1600 by Mile Waters RN  Pain Management Interventions:   quiet environment facilitated   relaxation techniques promoted  Taken 2/14/2023 1400 by Mile Waters RN  Pain Management Interventions:   quiet environment facilitated   relaxation techniques promoted  Taken 2/14/2023 1000 by Mile Waters RN  Pain Management Interventions: quiet environment facilitated  Taken 2/14/2023 0930 by Mile Waters RN  Pain Management Interventions:   relaxation techniques promoted   quiet environment facilitated  Intervention: Provide Person-Centered Care  Recent Flowsheet Documentation  Taken 2/14/2023 0930 by Mile Waters RN  Trust Relationship/Rapport:   care explained   choices provided  Goal: Readiness for Transition of Care  Outcome: Ongoing, Progressing     Problem: Diarrhea (Gastroenteritis)  Goal: Effective Diarrhea Management  Outcome: Ongoing, Progressing      Problem: Fluid Imbalance (Gastroenteritis)  Goal: Fluid Balance  Outcome: Ongoing, Progressing     Problem: Nausea and Vomiting (Gastroenteritis)  Goal: Nausea and Vomiting Relief  Outcome: Ongoing, Progressing  Intervention: Prevent and Manage Nausea and Vomiting  Recent Flowsheet Documentation  Taken 2/14/2023 4933 by Mile Waters RN  Nausea/Vomiting Interventions: nasogastric tube to suction

## 2023-02-14 NOTE — PROGRESS NOTES
2nd visit today. Admitted by partner earlier this morning    Gee Whitney is a 71 yo m w/ hx asthma, htn, hl, remote left inguinal hernia repair who presented w/ intermittent n/v/d x 4 days, associated w/ abdominal pain. In ED noted w/ leukocytosis, pyuria, ct a/p w/ dilated bowel loops w/ distended stomach. Due to ongoing n/v NG tube was placed, initiated on empiric zosyn.    Ileus vs partial SBO   N/V/D  Leukocytosis  Pyuria  DEB  Hypomagnesemia  Hx Hypothyroidism  Hx Asthma    Plan:  -continue LR 100cc/hr, replace mag/electrolytes  -continue NG tube, surgery consult pending  -gi pcr panel pending    Am labs: cbc,bmp,mag (follow culures)

## 2023-02-14 NOTE — ED PROVIDER NOTES
EMERGENCY DEPARTMENT ENCOUNTER    Pt Name: Gee Whitney  MRN: [id number]  Pt :   1950  Room Number:    Date of encounter:  2023  PCP: Tejinder Masters MD  ED Provider: ALEKSANDAR Urias    Historian: Patient and Wife    HPI:  Chief Complaint: Nausea, Vomiting, Diarrhea and Abdominal pain    Context: Gee Whitney is a 72 y.o. male who presents to the ED c/o nausea, vomiting, diarrhea and abdominal pain.  Patient reports that his symptoms started on Thursday, 2023.  He reports that he was constipated and had a bowel movement.  He reports that his symptoms resolved but that he had continued to have nausea and vomiting that worsened throughout the day today.  He also reports that he had not had a significant bowel movement since last Thursday.  He shares associated symptoms of pain in his upper abdomen.  He also reports a headache.  He denies any recent antibiotic usage.  He has no prior abdominal surgeries.  He reports no fever.  He denies any urinary complaints.  He does share that he takes a stool softener every morning.  No additional complaints on interview and exam.  HPI     REVIEW OF SYSTEMS  A chief complaint appropriate review of systems was completed and is negative except as noted in the HPI.     PAST MEDICAL HISTORY  Past Medical History:   Diagnosis Date   • Disease of thyroid gland    • Hyperlipidemia    • Hypertension    • Myocardial infarction (HCC)        PAST SURGICAL HISTORY  Past Surgical History:   Procedure Laterality Date   • BRONCHOSCOPY     • HERNIA REPAIR     • TENDON RELEASE      Left thumb        FAMILY HISTORY  Family History   Problem Relation Age of Onset   • Heart failure Mother    • Other Father 28         in car accident   • Heart attack Sister    • Mitral valve prolapse Sister    • Asthma Brother    • Stroke Brother    • Hypertension Child        SOCIAL HISTORY  Social History     Socioeconomic History   • Marital status:    • Number  of children: 2   Tobacco Use   • Smoking status: Former     Packs/day: 1.00     Years: 10.00     Pack years: 10.00     Types: Cigarettes     Quit date:      Years since quittin.1   • Smokeless tobacco: Never   Vaping Use   • Vaping Use: Never used   Substance and Sexual Activity   • Alcohol use: Not Currently     Comment: occassionally    • Drug use: No   • Sexual activity: Defer       ALLERGIES  Patient has no known allergies.    PHYSICAL EXAM  Physical Exam  GENERAL:   Appears in mild distress, ill appearing  HENT: Nares patent.  EYES: No scleral icterus.  CV: Regular rhythm, regular rate.  RESPIRATORY: Normal effort.  No audible wheezes, rales or rhonchi.  ABDOMEN: Distended, epigastric tenderness  MUSCULOSKELETAL: No deformities.   NEURO: Alert, moves all extremities, follows commands.  SKIN: Warm, dry, no rash visualized.    I have reviewed the triage vital signs and nursing notes.    Physical Exam     LAB RESULTS  Results for orders placed or performed during the hospital encounter of 23   Comprehensive Metabolic Panel    Specimen: Blood   Result Value Ref Range    Glucose 164 (H) 65 - 99 mg/dL    BUN 39 (H) 8 - 23 mg/dL    Creatinine 1.37 (H) 0.76 - 1.27 mg/dL    Sodium 138 136 - 145 mmol/L    Potassium 4.9 3.5 - 5.2 mmol/L    Chloride 103 98 - 107 mmol/L    CO2 21.0 (L) 22.0 - 29.0 mmol/L    Calcium 9.2 8.6 - 10.5 mg/dL    Total Protein 6.6 6.0 - 8.5 g/dL    Albumin 4.0 3.5 - 5.2 g/dL    ALT (SGPT) 12 1 - 41 U/L    AST (SGOT) 21 1 - 40 U/L    Alkaline Phosphatase 72 39 - 117 U/L    Total Bilirubin 0.3 0.0 - 1.2 mg/dL    Globulin 2.6 gm/dL    A/G Ratio 1.5 g/dL    BUN/Creatinine Ratio 28.5 (H) 7.0 - 25.0    Anion Gap 14.0 5.0 - 15.0 mmol/L    eGFR 54.1 (L) >60.0 mL/min/1.73   Lipase    Specimen: Blood   Result Value Ref Range    Lipase 30 13 - 60 U/L   High Sensitivity Troponin T    Specimen: Blood   Result Value Ref Range    HS Troponin T 33 (H) <15 ng/L   Urinalysis With Microscopic If  Indicated (No Culture) - Urine, Clean Catch    Specimen: Urine, Clean Catch   Result Value Ref Range    Color, UA Dark Yellow (A) Yellow, Straw    Appearance, UA Clear Clear    pH, UA <=5.0 5.0 - 8.0    Specific Gravity, UA >=1.030 1.001 - 1.030    Glucose, UA Negative Negative    Ketones, UA 15 mg/dL (1+) (A) Negative    Bilirubin, UA Negative Negative    Blood, UA Negative Negative    Protein,  mg/dL (2+) (A) Negative    Leuk Esterase, UA Small (1+) (A) Negative    Nitrite, UA Negative Negative    Urobilinogen, UA 0.2 E.U./dL 0.2 - 1.0 E.U./dL   CBC Auto Differential    Specimen: Blood   Result Value Ref Range    WBC 16.45 (H) 3.40 - 10.80 10*3/mm3    RBC 5.46 4.14 - 5.80 10*6/mm3    Hemoglobin 16.8 13.0 - 17.7 g/dL    Hematocrit 50.1 37.5 - 51.0 %    MCV 91.8 79.0 - 97.0 fL    MCH 30.8 26.6 - 33.0 pg    MCHC 33.5 31.5 - 35.7 g/dL    RDW 13.0 12.3 - 15.4 %    RDW-SD 43.8 37.0 - 54.0 fl    MPV 9.0 6.0 - 12.0 fL    Platelets 220 140 - 450 10*3/mm3    Neutrophil % 89.3 (H) 42.7 - 76.0 %    Lymphocyte % 3.6 (L) 19.6 - 45.3 %    Monocyte % 5.0 5.0 - 12.0 %    Eosinophil % 1.5 0.3 - 6.2 %    Basophil % 0.2 0.0 - 1.5 %    Immature Grans % 0.4 0.0 - 0.5 %    Neutrophils, Absolute 14.68 (H) 1.70 - 7.00 10*3/mm3    Lymphocytes, Absolute 0.59 (L) 0.70 - 3.10 10*3/mm3    Monocytes, Absolute 0.83 0.10 - 0.90 10*3/mm3    Eosinophils, Absolute 0.25 0.00 - 0.40 10*3/mm3    Basophils, Absolute 0.04 0.00 - 0.20 10*3/mm3    Immature Grans, Absolute 0.06 (H) 0.00 - 0.05 10*3/mm3    nRBC 0.0 0.0 - 0.2 /100 WBC   Lactic Acid, Plasma    Specimen: Blood   Result Value Ref Range    Lactate 1.5 0.5 - 2.0 mmol/L   High Sensitivity Troponin T 2Hr    Specimen: Blood   Result Value Ref Range    HS Troponin T 23 (H) <15 ng/L    Troponin T Delta -10 (L) >=-4 - <+4 ng/L   Urinalysis, Microscopic Only - Urine, Clean Catch    Specimen: Urine, Clean Catch   Result Value Ref Range    RBC, UA 3-6 (A) None Seen, 0-2 /HPF    WBC, UA 6-12 (A) None  Seen, 0-2 /HPF    Bacteria, UA Trace None Seen, Trace /HPF    Squamous Epithelial Cells, UA 0-2 None Seen, 0-2 /HPF    Hyaline Casts, UA 0-6 0 - 6 /LPF    Mucus, UA Small/1+ (A) None Seen, Trace /HPF    Methodology Manual Light Microscopy    ECG 12 Lead ED Triage Standing Order; Abdominal Pain (Upper)   Result Value Ref Range    QT Interval 362 ms    QTC Interval 417 ms   Green Top (Gel)   Result Value Ref Range    Extra Tube Hold for add-ons.    Lavender Top   Result Value Ref Range    Extra Tube hold for add-on    Gold Top - SST   Result Value Ref Range    Extra Tube Hold for add-ons.    Gray Top   Result Value Ref Range    Extra Tube Hold for add-ons.    Light Blue Top   Result Value Ref Range    Extra Tube Hold for add-ons.        If labs were ordered, I independently reviewed the results and considered them in treating the patient.    RADIOLOGY  CT Abdomen Pelvis With Contrast    (Results Pending)   XR Abdomen KUB    (Results Pending)     [x] Radiologist's Report Reviewed:  I ordered and independently reviewed the above noted radiographic studies.  See radiologist's dictation for official interpretation.  Radiology report did not crossover into the computer.  I have reviewed them via fax printout.    PROCEDURES    Procedures    ECG 12 Lead ED Triage Standing Order; Abdominal Pain (Upper)   Preliminary Result   Test Reason : UPPERGI   Blood Pressure :   */*   mmHG   Vent. Rate :  80 BPM     Atrial Rate :  80 BPM      P-R Int : 148 ms          QRS Dur :  96 ms       QT Int : 362 ms       P-R-T Axes :  54  44 115 degrees      QTc Int : 417 ms      Normal sinus rhythm   Possible Left atrial enlargement   Inferior infarct (cited on or before 07-MAR-2018)   Anteroseptal infarct (cited on or before 07-MAR-2018)   ST & T wave abnormality, consider lateral ischemia   Abnormal ECG   When compared with ECG of 07-MAR-2018 06:01,   No significant change was found      Referred By: BERNA           Confirmed By:            MEDICATIONS GIVEN IN ER    Medications   sodium chloride 0.9 % flush 10 mL (has no administration in time range)   famotidine (PEPCID) 10 MG/ML injection  - ADS Override Pull (has no administration in time range)   acetaminophen (TYLENOL) 500 MG tablet  - ADS Override Pull (has no administration in time range)   piperacillin-tazobactam (ZOSYN) 3.375 g in iso-osmotic dextrose 50 ml (premix) (has no administration in time range)   lactated ringers bolus 1,000 mL (has no administration in time range)   sodium chloride 0.9 % bolus 1,000 mL (0 mL Intravenous Stopped 2/14/23 0244)   iopamidol (ISOVUE-300) 61 % injection 100 mL (85 mL Intravenous Given 2/14/23 0147)   famotidine (PEPCID) injection 20 mg (20 mg Intravenous Given 2/14/23 0307)   acetaminophen (TYLENOL) tablet 1,000 mg (1,000 mg Oral Given 2/14/23 0307)       MEDICAL DECISION MAKING, PROGRESS, and CONSULTS   Medical Decision Making  Acute gastroenteritis: acute illness or injury  Diarrhea of presumed infectious origin: complicated acute illness or injury  History of coronary artery disease: chronic illness or injury  History of hyperlipidemia: chronic illness or injury  History of hypertension: chronic illness or injury  History of myocardial infarction: complicated acute illness or injury  Nausea and vomiting, unspecified vomiting type: complicated acute illness or injury  Pain of upper abdomen: complicated acute illness or injury  Amount and/or Complexity of Data Reviewed  Labs: ordered.  Radiology: ordered.  ECG/medicine tests: ordered.      Risk  OTC drugs.  Prescription drug management.  Decision regarding hospitalization.          All labs have been independently reviewed by me.  All radiology studies have been reviewed by me and the radiologist dictating the report.  All EKG's have been independently viewed by me.    [] Discussed with radiology regarding test interpretation:    Discussion below represents my analysis of pertinent findings related  to patient's condition, differential diagnosis, treatment plan and final disposition.    Differential diagnosis:  The differential diagnosis associated with the patient's presentation includes: Dyspepsia, viral gastroenteritis, colitis, constipation, medication side effect, irritable bowel syndrome, abdominal wall pain, gallbladder disease, pancreatitis, diverticulitis, appendicitis, kidney stone, UTI, hernia, DKA, food poisoning, pneumonia, mesenteric lymphadenitis, hepatitis, bowel obstruction or other emergent cause.    Additional sources  Discussed/ obtained information from independent historians:   [x] Spouse  [] Parent  [] Family member  [] Friend  [] EMS   [] Other:  External (non-ED) record review:   [] Inpatient record:   [] Office record:   [x] Outpatient record: Internal medicine, pulmonology and cardiology notes reviewed confirming patient's past medical history.   [] Prior Outpatient labs:   [] Prior Outpatient radiology:   [] Primary Care record:   [] Outside ED record:   [] Other:   Patient's care impacted by:   [] Diabetes  [x] Hypertension  [x] Hyperlipidemia  [x] Coronary Artery Disease   [x] COPD   [] Cancer   [] Tobacco Abuse   [] Substance Abuse    [] Other:   Care significantly affected by Social Determinants of Health (housing and economic circumstances, unemployment)    [] Yes     [x] No   If yes, Patient's care significantly limited by  Social Determinants of Health including:   [] Inadequate housing   [] Low income   [] Alcoholism and drug addiction in family   [] Problems related to primary support group   [] Unemployment   [] Problems related to employment   [] Other Social Determinants of Health:     Shared decision making: I reviewed work-up and findings of CT scan with patient and family member at bedside.  Hospital admission recommended.  Patient and family agreeable to plan of care.  Hospitalist recommendation for nasogastric tube implemented.    Orders placed during this  visit:  Orders Placed This Encounter   Procedures   • Blood Culture - Blood,   • Blood Culture - Blood,   • Gastrointestinal Panel, PCR - Stool, Per Rectum   • COVID PRE-OP / PRE-PROCEDURE SCREENING ORDER (NO ISOLATION) - Swab, Nasopharynx   • COVID-19, FLU A/B, RSV PCR - Swab, Nasopharynx   • Urine Culture - Urine,   • CT Abdomen Pelvis With Contrast   • XR Abdomen KUB   • [place] Draw   • Comprehensive Metabolic Panel   • Lipase   • High Sensitivity Troponin T   • Urinalysis With Microscopic If Indicated (No Culture) - Urine, Clean Catch   • CBC Auto Differential   • Lactic Acid, Plasma   • High Sensitivity Troponin T 2Hr   • Urinalysis, Microscopic Only - Urine, Clean Catch   • Procalcitonin   • NPO Diet NPO Type: Strict NPO   • Undress & Gown   • Cardiac Monitoring   • Continuous Pulse Oximetry   • Vital Signs   • Nasogastric tube insertion   • Code Status and Medical Interventions:   • Oxygen Therapy- Nasal Cannula; 2 LPM; Titrate for SPO2: 92%, Greater Than or Equal To   • ECG 12 Lead ED Triage Standing Order; Abdominal Pain (Upper)   • Insert Peripheral IV   • Initiate Observation Status   • ED Bed Request   • CBC & Differential   • Green Top (Gel)   • Lavender Top   • Gold Top - SST   • Gray Top   • Light Blue Top       ED Course:    ED Course as of 02/14/23 0516   Tue Feb 14, 2023   0436 Hospitalist consulted for admission and agreeable to accept patient.  Recommends nasogastric tube [JG]   0438 In summary this is a 72-year-old male who presents to the ER with complaints of nausea, vomiting, diarrhea and abdominal pain and distention.  Leukocytosis with white blood cell count of 16.45, mild DEB with serum creatinine 1.37.  UA findings consistent with urinary tract infection.  EKG without evidence of acute ischemic changes.  CT of abdomen and pelvis demonstrated diffusely distended fluid-filled loops of small bowel.  No focal transition of bowel caliber seen.  Findings suggest ileus or small bowel enteritis  more likely than partial obstruction.  No additional intra-abdominal findings.  Symptoms managed in the ED.  Hospitalist consulted for admission and agreeable to accept patient.  Patient and family at bedside agreeable to plan of care and hospital admission. [JG]   0502 Nasogastric tube placed at bedside by RN staff successfully [JG]      ED Course User Index  [JG] Perry King PA            DIAGNOSIS  Final diagnoses:   Acute gastroenteritis   Nausea and vomiting, unspecified vomiting type   Diarrhea of presumed infectious origin   Pain of upper abdomen   History of hypertension   History of hyperlipidemia   History of myocardial infarction   History of coronary artery disease       DISPOSITION    ED Disposition     ED Disposition   Decision to Admit    Condition   --    Comment   Level of Care: Telemetry [5]   Diagnosis: Acute gastroenteritis [873709]   Admitting Physician: GUIDO WALTER [[id number]]   Attending Physician: GUIDO WALTER [[id number]]               Please note that portions of this document were completed with voice recognition software.      Perry King PA  02/14/23 0579

## 2023-02-14 NOTE — CASE MANAGEMENT/SOCIAL WORK
Discharge Planning Assessment  [facility]     Patient Name: Gee Whitney  MRN: [id number]  Today's Date: 2/14/2023    Admit Date: 2/14/2023    Plan: home   Discharge Needs Assessment     Row Name 02/14/23 1053       Living Environment    People in Home spouse    Current Living Arrangements home    Primary Care Provided by self    Provides Primary Care For no one    Family Caregiver if Needed spouse    Quality of Family Relationships involved;supportive;helpful    Able to Return to Prior Arrangements yes       Resource/Environmental Concerns    Resource/Environmental Concerns none       Transition Planning    Patient/Family Anticipates Transition to home with family    Transportation Anticipated family or friend will provide               Discharge Plan     Row Name 02/14/23 1054       Plan    Plan home    Patient/Family in Agreement with Plan yes    Plan Comments Mr. Bashir resides in [facility] with his spouse.   He is independent with ADL's and does not use any DME.  He is not current with home health or PT.  He has prescription coverage with his insurance.  Plan is home at discharge and spouse will transport.    Final Discharge Disposition Code 01 - home or self-care              Continued Care and Services - Admitted Since 2/14/2023    Coordination has not been started for this encounter.       Expected Discharge Date and Time     Expected Discharge Date Expected Discharge Time    Feb 17, 2023          Demographic Summary     Row Name 02/14/23 1050       General Information    Reason for Consult discharge planning    Preferred Language English    General Information Comments PCP - Tejinder Masters               Functional Status     Row Name 02/14/23 1051       Functional Status    Usual Activity Tolerance good    Current Activity Tolerance good       Functional Status, IADL    Medications independent    Meal Preparation assistive person    Housekeeping independent    Laundry independent    Shopping  independent               Psychosocial    No documentation.                Abuse/Neglect    No documentation.                Legal    No documentation.                Substance Abuse    No documentation.                Patient Forms    No documentation.                   Michelle Nathan RN

## 2023-02-15 VITALS
HEIGHT: 69 IN | HEART RATE: 72 BPM | BODY MASS INDEX: 24.44 KG/M2 | WEIGHT: 165 LBS | OXYGEN SATURATION: 93 % | DIASTOLIC BLOOD PRESSURE: 77 MMHG | SYSTOLIC BLOOD PRESSURE: 151 MMHG | RESPIRATION RATE: 16 BRPM | TEMPERATURE: 98.2 F

## 2023-02-15 LAB
ANION GAP SERPL CALCULATED.3IONS-SCNC: 8 MMOL/L (ref 5–15)
BACTERIA SPEC AEROBE CULT: NO GROWTH
BUN SERPL-MCNC: 31 MG/DL (ref 8–23)
BUN/CREAT SERPL: 23.8 (ref 7–25)
CALCIUM SPEC-SCNC: 7.9 MG/DL (ref 8.6–10.5)
CHLORIDE SERPL-SCNC: 109 MMOL/L (ref 98–107)
CO2 SERPL-SCNC: 22 MMOL/L (ref 22–29)
CREAT SERPL-MCNC: 1.3 MG/DL (ref 0.76–1.27)
DEPRECATED RDW RBC AUTO: 44.4 FL (ref 37–54)
EGFRCR SERPLBLD CKD-EPI 2021: 57.6 ML/MIN/1.73
ERYTHROCYTE [DISTWIDTH] IN BLOOD BY AUTOMATED COUNT: 13.4 % (ref 12.3–15.4)
GLUCOSE SERPL-MCNC: 87 MG/DL (ref 65–99)
HCT VFR BLD AUTO: 40 % (ref 37.5–51)
HGB BLD-MCNC: 13.6 G/DL (ref 13–17.7)
MAGNESIUM SERPL-MCNC: 2.3 MG/DL (ref 1.6–2.4)
MCH RBC QN AUTO: 30.9 PG (ref 26.6–33)
MCHC RBC AUTO-ENTMCNC: 34 G/DL (ref 31.5–35.7)
MCV RBC AUTO: 90.9 FL (ref 79–97)
PLATELET # BLD AUTO: 183 10*3/MM3 (ref 140–450)
PMV BLD AUTO: 9.1 FL (ref 6–12)
POTASSIUM SERPL-SCNC: 4.2 MMOL/L (ref 3.5–5.2)
RBC # BLD AUTO: 4.4 10*6/MM3 (ref 4.14–5.8)
SODIUM SERPL-SCNC: 139 MMOL/L (ref 136–145)
WBC NRBC COR # BLD: 10.67 10*3/MM3 (ref 3.4–10.8)

## 2023-02-15 PROCEDURE — 94664 DEMO&/EVAL PT USE INHALER: CPT

## 2023-02-15 PROCEDURE — 80048 BASIC METABOLIC PNL TOTAL CA: CPT | Performed by: INTERNAL MEDICINE

## 2023-02-15 PROCEDURE — 83735 ASSAY OF MAGNESIUM: CPT | Performed by: INTERNAL MEDICINE

## 2023-02-15 PROCEDURE — 25010000002 PIPERACILLIN SOD-TAZOBACTAM PER 1 G

## 2023-02-15 PROCEDURE — 99238 HOSP IP/OBS DSCHRG MGMT 30/<: CPT | Performed by: INTERNAL MEDICINE

## 2023-02-15 PROCEDURE — 94799 UNLISTED PULMONARY SVC/PX: CPT

## 2023-02-15 PROCEDURE — G0378 HOSPITAL OBSERVATION PER HR: HCPCS

## 2023-02-15 PROCEDURE — 96375 TX/PRO/DX INJ NEW DRUG ADDON: CPT

## 2023-02-15 PROCEDURE — 85027 COMPLETE CBC AUTOMATED: CPT | Performed by: INTERNAL MEDICINE

## 2023-02-15 PROCEDURE — 25010000002 METOCLOPRAMIDE PER 10 MG: Performed by: SURGERY

## 2023-02-15 RX ORDER — ONDANSETRON 4 MG/1
4 TABLET, FILM COATED ORAL EVERY 6 HOURS PRN
Status: DISCONTINUED | OUTPATIENT
Start: 2023-02-15 | End: 2023-02-15 | Stop reason: HOSPADM

## 2023-02-15 RX ORDER — METOCLOPRAMIDE HYDROCHLORIDE 5 MG/ML
10 INJECTION INTRAMUSCULAR; INTRAVENOUS EVERY 6 HOURS
Status: DISCONTINUED | OUTPATIENT
Start: 2023-02-15 | End: 2023-02-15 | Stop reason: HOSPADM

## 2023-02-15 RX ORDER — DOCUSATE SODIUM 100 MG/1
100 CAPSULE, LIQUID FILLED ORAL 2 TIMES DAILY
Status: DISCONTINUED | OUTPATIENT
Start: 2023-02-15 | End: 2023-02-15 | Stop reason: HOSPADM

## 2023-02-15 RX ORDER — ACETAMINOPHEN 160 MG/5ML
650 SOLUTION ORAL EVERY 4 HOURS PRN
Status: DISCONTINUED | OUTPATIENT
Start: 2023-02-15 | End: 2023-02-15 | Stop reason: HOSPADM

## 2023-02-15 RX ORDER — ACETAMINOPHEN 325 MG/1
650 TABLET ORAL EVERY 4 HOURS PRN
Status: DISCONTINUED | OUTPATIENT
Start: 2023-02-15 | End: 2023-02-15 | Stop reason: HOSPADM

## 2023-02-15 RX ORDER — ACETAMINOPHEN 650 MG/1
650 SUPPOSITORY RECTAL EVERY 4 HOURS PRN
Status: DISCONTINUED | OUTPATIENT
Start: 2023-02-15 | End: 2023-02-15 | Stop reason: HOSPADM

## 2023-02-15 RX ORDER — IPRATROPIUM BROMIDE AND ALBUTEROL SULFATE 2.5; .5 MG/3ML; MG/3ML
3 SOLUTION RESPIRATORY (INHALATION) EVERY 6 HOURS PRN
Status: DISCONTINUED | OUTPATIENT
Start: 2023-02-15 | End: 2023-02-15 | Stop reason: HOSPADM

## 2023-02-15 RX ORDER — SODIUM CHLORIDE, SODIUM LACTATE, POTASSIUM CHLORIDE, CALCIUM CHLORIDE 600; 310; 30; 20 MG/100ML; MG/100ML; MG/100ML; MG/100ML
100 INJECTION, SOLUTION INTRAVENOUS CONTINUOUS
Status: DISCONTINUED | OUTPATIENT
Start: 2023-02-15 | End: 2023-02-15 | Stop reason: HOSPADM

## 2023-02-15 RX ORDER — SODIUM CHLORIDE 0.9 % (FLUSH) 0.9 %
10 SYRINGE (ML) INJECTION AS NEEDED
Status: DISCONTINUED | OUTPATIENT
Start: 2023-02-15 | End: 2023-02-15 | Stop reason: HOSPADM

## 2023-02-15 RX ORDER — SODIUM CHLORIDE 9 MG/ML
40 INJECTION, SOLUTION INTRAVENOUS AS NEEDED
Status: DISCONTINUED | OUTPATIENT
Start: 2023-02-15 | End: 2023-02-15 | Stop reason: HOSPADM

## 2023-02-15 RX ORDER — BISACODYL 5 MG/1
10 TABLET, DELAYED RELEASE ORAL DAILY
Status: DISCONTINUED | OUTPATIENT
Start: 2023-02-15 | End: 2023-02-15

## 2023-02-15 RX ORDER — SODIUM CHLORIDE 0.9 % (FLUSH) 0.9 %
10 SYRINGE (ML) INJECTION EVERY 12 HOURS SCHEDULED
Status: DISCONTINUED | OUTPATIENT
Start: 2023-02-15 | End: 2023-02-15 | Stop reason: HOSPADM

## 2023-02-15 RX ORDER — DOCUSATE SODIUM 100 MG/1
100 CAPSULE, LIQUID FILLED ORAL 2 TIMES DAILY PRN
Start: 2023-02-15

## 2023-02-15 RX ORDER — ONDANSETRON 2 MG/ML
4 INJECTION INTRAMUSCULAR; INTRAVENOUS EVERY 6 HOURS PRN
Status: DISCONTINUED | OUTPATIENT
Start: 2023-02-15 | End: 2023-02-15 | Stop reason: HOSPADM

## 2023-02-15 RX ADMIN — BUDESONIDE AND FORMOTEROL FUMARATE DIHYDRATE 2 PUFF: 160; 4.5 AEROSOL RESPIRATORY (INHALATION) at 10:06

## 2023-02-15 RX ADMIN — TAZOBACTAM SODIUM AND PIPERACILLIN SODIUM 3.38 G: 375; 3 INJECTION, SOLUTION INTRAVENOUS at 08:46

## 2023-02-15 RX ADMIN — AMLODIPINE BESYLATE 2.5 MG: 2.5 TABLET ORAL at 08:44

## 2023-02-15 RX ADMIN — METOCLOPRAMIDE 10 MG: 5 INJECTION, SOLUTION INTRAMUSCULAR; INTRAVENOUS at 08:44

## 2023-02-15 RX ADMIN — LEVOTHYROXINE SODIUM 75 MCG: 0.07 TABLET ORAL at 06:07

## 2023-02-15 NOTE — CASE MANAGEMENT/SOCIAL WORK
Case Management Discharge Note      Final Note: No needs identified.  Plan home with spouse.                      Final Discharge Disposition Code: 01 - home or self-care

## 2023-02-15 NOTE — PROGRESS NOTES
"Patient Name:  Gee Whitney  YOB: 1950  [id number]    Surgery Progress Note    Date of visit: 2/15/2023    Subjective   Subjective: Feels much better. Tolerating PO, having BM's. Abdomen feels much better.         Objective     Objective:     /75 (BP Location: Right arm, Patient Position: Lying)   Pulse 78   Temp 98.4 °F (36.9 °C) (Oral)   Resp 18   Ht 175.3 cm (69\")   Wt 74.8 kg (165 lb)   SpO2 91%   BMI 24.37 kg/m²     Intake/Output Summary (Last 24 hours) at 2/15/2023 0749  Last data filed at 2/15/2023 0400  Gross per 24 hour   Intake 1310 ml   Output 500 ml   Net 810 ml       CV:  Rhythm  regular and rate regular   L:  Clear  to auscultation bilaterally   Abd:  Bowel sounds positive and active , soft, nontender  Ext:  No cyanosis, clubbing, edema    Recent labs that are back at this time have been reviewed.           Assessment/ Plan:    Problem List Items Addressed This Visit        Gastrointestinal Abdominal     Acute gastroenteritis - Primary- I suspect that his abdominal symptoms were likely secondary to his known significant urinary tract infection.  His symptoms have improved.  Continue to advance diet as tolerated.  No need for surgical intervention at this time.  I would recommend a gentle bowel regimen moving forward, as well as increasing his p.o. intake of fluids.   Other Visit Diagnoses     Nausea and vomiting, unspecified vomiting type        Diarrhea of presumed infectious origin        Pain of upper abdomen        History of hypertension        History of hyperlipidemia        History of myocardial infarction        History of coronary artery disease               Active Hospital Problems    Diagnosis  POA   • **Ileus (HCC) [K56.7]  Yes   • Leukocytosis [D72.829]  Yes   • DEB (acute kidney injury) (HCC) [N17.9]  Yes   • Hypothyroidism [E03.9]  Yes   • Coronary artery disease [I25.10]  Yes   • Hypertension [I10]  Yes   • Dyslipidemia [E78.5]  Yes      Resolved " Hospital Problems   No resolved problems to display.              Delonte Driver MD  2/15/2023  07:49 EST

## 2023-02-15 NOTE — PLAN OF CARE
Goal Outcome Evaluation:  Plan of Care Reviewed With: patient        Progress: improving  Outcome Evaluation: VSS on RA. No complaints of pain, nausea or vomiting. Still having BM but no blood. DC home with wife

## 2023-02-15 NOTE — PLAN OF CARE
Goal Outcome Evaluation:  Plan of Care Reviewed With: patient        Progress: improving  Outcome Evaluation: VSS on room air, IV fluids infusing. No complaints of pain, tenderness, or nausea. Diarrhea episode, no blood noted. Pt appears to be sleeping at this time.

## 2023-02-15 NOTE — DISCHARGE SUMMARY
[facility] Medicine Services  DISCHARGE SUMMARY    Patient Name: Gee Whitney  : 1950  MRN: [id number]    Date of Admission: 2023 12:14 AM  Date of Discharge:  2/15/2023  Primary Care Physician: Tejinder Masters MD    Consults     Date and Time Order Name Status Description    2/15/2023  8:01 AM Inpatient General Surgery Consult Completed     2023 12:47 PM Inpatient Gastroenterology Consult Completed     2023  7:09 AM Inpatient General Surgery Consult Completed           Hospital Course     Presenting Problem:   Acute gastroenteritis [K52.9]    Active Hospital Problems    Diagnosis  POA   • **Gastroenteritis [K52.9]  Unknown   • Leukocytosis [D72.829]  Yes   • DEB (acute kidney injury) (HCC) [N17.9]  Yes   • Hypothyroidism [E03.9]  Yes   • Coronary artery disease [I25.10]  Yes   • Hypertension [I10]  Yes   • Dyslipidemia [E78.5]  Yes      Resolved Hospital Problems    Diagnosis Date Resolved POA   • Ileus (HCC) [K56.7] 2023 Yes          Hospital Course:  Gee Whitney is a 72 y.o. male who presented with a day of progressive nausea and vomiting as well as diarrhea.  CT scan revealed likely gastroenteritis.  He was observed overnight, improved, tolerated breakfast, and went home.     Discharge Follow Up Recommendations for outpatient labs/diagnostics:   *FU with PCP per routine, or earlier if GI problems recur     Day of Discharge     HPI:   Feels great, though still having diarrhea.  Tolerated breakfast without nausea.  He has no stomach cramping.       Review of Systems  Denies dysuria or hematuria     Vital Signs:          Physical Exam:  Constitutional: Awake, alert in bed, NAD.   Eyes: PER  HENT: NCAT, mucous membranes moist  Neck: Supple,   Respiratory: Clear to auscultation bilaterally, nonlabored respirations   Cardiovascular: RRR, no murmurs  Gastrointestinal: Positive bowel sounds, soft, nontender, nondistended  Musculoskeletal: No  bilateral ankle edema, no clubbing or cyanosis to extremities  Psychiatric: Appropriate affect, cooperative  Neurologic: Oriented x 3, strength symmetric in all extremities, Cranial Nerves grossly intact to confrontation, speech clear  Skin: No rashes      Pertinent  and/or Most Recent Results     LAB RESULTS:      Lab 02/15/23  0708 02/14/23  0914 02/14/23 0227 02/14/23 0036   WBC 10.67 13.43*  --  16.45*   HEMOGLOBIN 13.6 15.8  --  16.8   HEMATOCRIT 40.0 46.7  --  50.1   PLATELETS 183 203  --  220   NEUTROS ABS  --   --   --  14.68*   IMMATURE GRANS (ABS)  --   --   --  0.06*   LYMPHS ABS  --   --   --  0.59*   MONOS ABS  --   --   --  0.83   EOS ABS  --   --   --  0.25   MCV 90.9 92.7  --  91.8   PROCALCITONIN  --   --  0.11  --    LACTATE  --   --   --  1.5         Lab 02/15/23  0708 02/14/23 0914 02/14/23 0227 02/14/23 0036   SODIUM 139 137  --  138   POTASSIUM 4.2 4.6  --  4.9   CHLORIDE 109* 106  --  103   CO2 22.0 19.0*  --  21.0*   ANION GAP 8.0 12.0  --  14.0   BUN 31* 40*  --  39*   CREATININE 1.30* 1.27  --  1.37*   EGFR 57.6* 59.3*  --  54.1*   GLUCOSE 87 119*  --  164*   CALCIUM 7.9* 8.2*  --  9.2   MAGNESIUM 2.3 1.4*  --   --    TSH  --   --  1.520  --          Lab 02/14/23 0036   TOTAL PROTEIN 6.6   ALBUMIN 4.0   GLOBULIN 2.6   ALT (SGPT) 12   AST (SGOT) 21   BILIRUBIN 0.3   ALK PHOS 72   LIPASE 30         Lab 02/14/23 0227 02/14/23 0036   HSTROP T 23* 33*                 Brief Urine Lab Results  (Last result in the past 365 days)      Color   Clarity   Blood   Leuk Est   Nitrite   Protein   CREAT   Urine HCG        02/14/23 0109 Dark Yellow   Clear   Negative   Small (1+)   Negative   100 mg/dL (2+)               Microbiology Results (last 10 days)     Procedure Component Value - Date/Time    Gastrointestinal Panel, PCR - Stool, Per Rectum [[id number]]  (Normal) Collected: 02/14/23 0916    Lab Status: Final result Specimen: Stool from Per Rectum Updated: 02/14/23 1124     Campylobacter Not  Detected     Plesiomonas shigelloides Not Detected     Salmonella Not Detected     Vibrio Not Detected     Vibrio cholerae Not Detected     Yersinia enterocolitica Not Detected     Enteroaggregative E. coli (EAEC) Not Detected     Enteropathogenic E. coli (EPEC) Not Detected     Enterotoxigenic E. coli (ETEC) lt/st Not Detected     Shiga-like toxin-producing E. coli (STEC) stx1/stx2 Not Detected     Shigella/Enteroinvasive E. coli (EIEC) Not Detected     Cryptosporidium Not Detected     Cyclospora cayetanensis Not Detected     Entamoeba histolytica Not Detected     Giardia lamblia Not Detected     Adenovirus F40/41 Not Detected     Astrovirus Not Detected     Norovirus GI/GII Not Detected     Rotavirus A Not Detected     Sapovirus (I, II, IV or V) Not Detected    Blood Culture - Blood, Arm, Right [[id number]]  (Normal) Collected: 02/14/23 0555    Lab Status: Preliminary result Specimen: Blood from Arm, Right Updated: 02/18/23 0615     Blood Culture No growth at 4 days    Blood Culture - Blood, Arm, Left [[id number]]  (Normal) Collected: 02/14/23 0525    Lab Status: Preliminary result Specimen: Blood from Arm, Left Updated: 02/18/23 0615     Blood Culture No growth at 4 days    COVID PRE-OP / PRE-PROCEDURE SCREENING ORDER (NO ISOLATION) - Swab, Nasopharynx [[id number]]  (Normal) Collected: 02/14/23 0411    Lab Status: Final result Specimen: Swab from Nasopharynx Updated: 02/14/23 0536    Narrative:      The following orders were created for panel order COVID PRE-OP / PRE-PROCEDURE SCREENING ORDER (NO ISOLATION) - Swab, Nasopharynx.  Procedure                               Abnormality         Status                     ---------                               -----------         ------                     COVID-19, FLU A/B, RSV P...[[id number]]  Normal              Final result                 Please view results for these tests on the individual orders.    COVID-19, FLU A/B, RSV PCR - Swab, Nasopharynx [[id number]]   (Normal) Collected: 02/14/23 0411    Lab Status: Final result Specimen: Swab from Nasopharynx Updated: 02/14/23 0536     COVID19 Not Detected     Influenza A PCR Not Detected     Influenza B PCR Not Detected     RSV, PCR Not Detected    Narrative:      Fact sheet for providers: https://www.fda.gov/media/543259/download    Fact sheet for patients: https://www.fda.gov/media/495808/download    Test performed by PCR.    Urine Culture - Urine, Urine, Clean Catch [[id number]]  (Normal) Collected: 02/14/23 0109    Lab Status: Final result Specimen: Urine, Clean Catch Updated: 02/15/23 0535     Urine Culture No growth          CT Abdomen Pelvis With Contrast    Result Date: 2/14/2023  CT ABDOMEN PELVIS W CONTRAST Date of Exam: 2/14/2023 1:38 AM EST Indication: abdominal pain, nausea, vomiting, diarrhea. Comparison: None available. Technique: Axial CT images were obtained of the abdomen and pelvis following the uneventful intravenous administration of 85 cc Isovue-300. Reconstructed coronal and sagittal images were also obtained. Automated exposure control and iterative construction methods were used. Findings: Lower Chest: Atelectasis and tiny granuloma in the left lower lobe. Fat-containing Bochdalek hernia on the right. Small hiatal hernia Organs: Liver, spleen, pancreas, adrenal glands unremarkable. Numerous bilateral renal cysts. Unremarkable gallbladder Gastrointestinal: Mild diffuse small bowel fluid distention. No transition to clearly decompressed small bowel. No small bowel wall thickening. Fluid throughout the colon. No colonic wall thickening. Pelvis: No abnormal fluid collection. Enlarged prostate. Urinary bladder grossly unremarkable Peritoneum/Retroperitoneum: No ascites or pneumoperitoneum. Normal caliber aorta Bones/Soft Tissues: No acute bony abnormality     Findings are most compatible with nonspecific gastroenteritis Electronically Signed: Gee Alicea  2/14/2023 10:07 AM EST  Workstation ID:  [id number]    XR Abdomen KUB    Result Date: 2/15/2023  XR ABDOMEN KUB Date of Exam: 2/14/2023 3:31 PM EST Indication: possible ileus. Comparison: 2/14/2023 Findings: No gross free air or pneumatosis though evaluation is slightly limited due to technique. Dilated small bowel loops are present within the mid abdomen, left greater than right. Some air is present within the colon. Enteric tube present within the stomach.. A mild stool burden is present. No suspicious calcifications identified. No acute osseous abnormality identified.     Impression: Dilated small bowel loops, similar as compared to the previous study with air present within the colon. Findings may be related to an ileus though partial bowel obstruction cannot be excluded. Radiographic follow-up recommended. Electronically Signed: Miranda Anderson  2/15/2023 9:28 AM EST  Workstation ID: [id number]    XR Abdomen KUB    Result Date: 2/14/2023  EXAMINATION: XR ABDOMEN KUB  DATE OF EXAM: 2/14/2023 5:12 AM HISTORY: confirm NG tube placement COMPARISON: None. FINDINGS: Nasogastric tube tip is in the proximal stomach. Bowel gas pattern is nonobstructive. Lung bases are clear. Cardiomediastinal silhouette is normal. Upper abdomen is normal.     Nasogastric tube tip is in the stomach. Electronically signed by:  Chetan Merritt M.D.  2/14/2023 3:43 AM Mountain Time    Plan for Follow-up of Pending Labs/Results: I will follow   Pending Labs     Order Current Status    Blood Culture - Blood, Arm, Left Preliminary result    Blood Culture - Blood, Arm, Right Preliminary result        Discharge Details        Discharge Medications      Changes to Medications      Instructions Start Date   docusate sodium 100 MG capsule  Commonly known as: Stool Softener  What changed:   · medication strength  · how much to take  · when to take this  · reasons to take this   100 mg, Oral, 2 Times Daily PRN, 100mg         Continue These Medications      Instructions Start Date   albuterol  sulfate  (90 Base) MCG/ACT inhaler  Commonly known as: PROVENTIL HFA;VENTOLIN HFA;PROAIR HFA   albuterol sulfate HFA 90 mcg/actuation aerosol inhaler      amLODIPine 2.5 MG tablet  Commonly known as: NORVASC   2.5 mg, Oral, Daily      aspirin  MG tablet   325 mg, Oral, Daily      atorvastatin 40 MG tablet  Commonly known as: LIPITOR   40 mg, Oral, Nightly      cholecalciferol 25 MCG (1000 UT) tablet  Commonly known as: VITAMIN D3   5,000 Units, Oral, Daily      Fish Oil 1200 MG capsule delayed-release capsule   1,200 mg, Oral, Daily      ICAPS AREDS 2 PO   Oral      ipratropium 0.06 % nasal spray  Commonly known as: ATROVENT   2 sprays, Nasal, 3 Times Daily      levothyroxine 75 MCG tablet  Commonly known as: SYNTHROID, LEVOTHROID   75 mcg, Oral, Daily      lisinopril 40 MG tablet  Commonly known as: PRINIVIL,ZESTRIL   40 mg, Oral, Daily      multivitamin with minerals tablet tablet   1 tablet, Oral, Daily      nitroglycerin 0.4 MG SL tablet  Commonly known as: NITROSTAT   0.4 mg, Sublingual, Every 5 Minutes PRN      Stiolto Respimat 2.5-2.5 MCG/ACT aerosol solution inhaler  Generic drug: tiotropium bromide-olodaterol   2 puffs, Inhalation, Daily      traZODone 50 MG tablet  Commonly known as: DESYREL   1 tablet, Oral, Nightly             No Known Allergies      Discharge Disposition:  Home or Self Care    Diet:  Hospital:  No active diet order         CODE STATUS:    Code Status and Medical Interventions:   Ordered at: 02/14/23 0541     Level Of Support Discussed With:    Patient     Code Status (Patient has no pulse and is not breathing):    CPR (Attempt to Resuscitate)     Medical Interventions (Patient has pulse or is breathing):    Full Support       Future Appointments   Date Time Provider Department Center   5/8/2023  8:00 AM [facility]   5/15/2023  9:30 AM Shaun Joseph MD [facility]   6/7/2023  8:30 AM Gab Brooke MD [facility]                  Vidhi Perry MD  02/18/23      Time Spent on Discharge:  I spent  30  minutes on this discharge activity which included: face-to-face encounter with the patient, reviewing the data in the system, coordination of the care with the nursing staff as well as consultants, documentation, and entering orders.

## 2023-02-16 LAB
QT INTERVAL: 362 MS
QTC INTERVAL: 417 MS

## 2023-02-18 PROBLEM — K56.7 ILEUS (HCC): Status: RESOLVED | Noted: 2023-02-14 | Resolved: 2023-02-18

## 2023-02-19 LAB
BACTERIA SPEC AEROBE CULT: NORMAL
BACTERIA SPEC AEROBE CULT: NORMAL

## 2023-05-08 ENCOUNTER — HOSPITAL ENCOUNTER (OUTPATIENT)
Dept: CARDIOLOGY | Facility: HOSPITAL | Age: 73
Discharge: HOME | End: 2023-05-08
Admitting: NURSE PRACTITIONER
Payer: MEDICARE

## 2023-05-08 VITALS
HEART RATE: 57 BPM | WEIGHT: 165 LBS | DIASTOLIC BLOOD PRESSURE: 84 MMHG | SYSTOLIC BLOOD PRESSURE: 124 MMHG | HEIGHT: 69 IN | BODY MASS INDEX: 24.44 KG/M2

## 2023-05-08 DIAGNOSIS — I25.10 CORONARY ARTERY DISEASE INVOLVING NATIVE CORONARY ARTERY OF NATIVE HEART WITHOUT ANGINA PECTORIS: ICD-10-CM

## 2023-05-08 DIAGNOSIS — E78.5 DYSLIPIDEMIA: ICD-10-CM

## 2023-05-08 DIAGNOSIS — I10 ESSENTIAL HYPERTENSION: ICD-10-CM

## 2023-05-08 PROCEDURE — A9500 TC99M SESTAMIBI: HCPCS | Performed by: NURSE PRACTITIONER

## 2023-05-08 PROCEDURE — 93017 CV STRESS TEST TRACING ONLY: CPT

## 2023-05-08 PROCEDURE — 93018 CV STRESS TEST I&R ONLY: CPT | Performed by: INTERNAL MEDICINE

## 2023-05-08 PROCEDURE — 0 TECHNETIUM SESTAMIBI: Performed by: NURSE PRACTITIONER

## 2023-05-08 PROCEDURE — 78452 HT MUSCLE IMAGE SPECT MULT: CPT | Performed by: INTERNAL MEDICINE

## 2023-05-08 PROCEDURE — 78452 HT MUSCLE IMAGE SPECT MULT: CPT

## 2023-05-08 RX ADMIN — TECHNETIUM TC 99M SESTAMIBI 1 DOSE: 1 INJECTION INTRAVENOUS at 08:05

## 2023-05-08 RX ADMIN — TECHNETIUM TC 99M SESTAMIBI 1 DOSE: 1 INJECTION INTRAVENOUS at 09:35

## 2023-05-09 LAB
BH CV REST NUCLEAR ISOTOPE DOSE: 9.9 MCI
BH CV STRESS BP STAGE 1: NORMAL
BH CV STRESS BP STAGE 2: NORMAL
BH CV STRESS DURATION MIN STAGE 1: 3
BH CV STRESS DURATION MIN STAGE 2: 3
BH CV STRESS DURATION MIN STAGE 3: 1
BH CV STRESS DURATION SEC STAGE 1: 0
BH CV STRESS DURATION SEC STAGE 2: 0
BH CV STRESS DURATION SEC STAGE 3: 25
BH CV STRESS GRADE STAGE 1: 10
BH CV STRESS GRADE STAGE 2: 12
BH CV STRESS GRADE STAGE 3: 14
BH CV STRESS HR STAGE 1: 102
BH CV STRESS HR STAGE 2: 123
BH CV STRESS HR STAGE 3: 127
BH CV STRESS METS STAGE 1: 5
BH CV STRESS METS STAGE 2: 7.5
BH CV STRESS METS STAGE 3: 10
BH CV STRESS NUCLEAR ISOTOPE DOSE: 32 MCI
BH CV STRESS O2 STAGE 1: 99
BH CV STRESS O2 STAGE 2: 99
BH CV STRESS O2 STAGE 3: 99
BH CV STRESS PROTOCOL 1: NORMAL
BH CV STRESS RECOVERY BP: NORMAL MMHG
BH CV STRESS RECOVERY HR: 75 BPM
BH CV STRESS RECOVERY O2: 99 %
BH CV STRESS SPEED STAGE 1: 1.7
BH CV STRESS SPEED STAGE 2: 2.5
BH CV STRESS SPEED STAGE 3: 3.4
BH CV STRESS STAGE 1: 1
BH CV STRESS STAGE 2: 2
BH CV STRESS STAGE 3: 3
LV EF NUC BP: 32 %
MAXIMAL PREDICTED HEART RATE: 146 BPM
PERCENT MAX PREDICTED HR: 89.04 %
STRESS BASELINE BP: NORMAL MMHG
STRESS BASELINE HR: 57 BPM
STRESS O2 SAT REST: 96 %
STRESS PERCENT HR: 105 %
STRESS POST ESTIMATED WORKLOAD: 9.1 METS
STRESS POST EXERCISE DUR MIN: 7 MIN
STRESS POST EXERCISE DUR SEC: 25 SEC
STRESS POST O2 SAT PEAK: 99 %
STRESS POST PEAK BP: NORMAL MMHG
STRESS POST PEAK HR: 130 BPM
STRESS TARGET HR: 124 BPM

## 2023-05-09 NOTE — PROGRESS NOTES
Subjective:     Encounter Date:05/10/2023    Primary Care Physician: Tejinder Masters MD      Patient ID: Gee Whitney is a 72 y.o. male.    Chief Complaint:Coronary Artery Disease (Stress test follow up)    PROBLEM LIST:  1. Coronary artery disease:  a. [facility] for MI, 1977, Dr. Carlos Gilmore: Occluded LAD with reported collaterals per patient report, normal LVEF.  b. MPS, 2014: EF 50%, anterior apical scar. No ischemia.  c. US aorta, 04/27/2016: No abdominal aortic aneurysm.  d. Exercise MPS, 4/1/2019: prior infarction of the anterior apex, and anterior septum with no significant ischemia noted. EF 40%. Severe hypokinesis.  e. 5/9/2023 MPS with infarct of anterior and significant ischemia.  EF 32%.  Severe hypokinesis of anterior and septal wall.  2. Hypertension.   3. Dyslipidemia.  4. COPD  5. COVID-19 1/2022  1. Did not require hospitalization  6. History of elevated LFTs.   7. Traumatic hemothorax 2018  8. Renal cyst.  9. Hypothyroidism.  10. Bilateral cataract surgery.    11. Remote tobacco abuse.      No Known Allergies      Current Outpatient Medications:   •  albuterol sulfate  (90 Base) MCG/ACT inhaler, albuterol sulfate HFA 90 mcg/actuation aerosol inhaler, Disp: , Rfl:   •  amLODIPine (NORVASC) 2.5 MG tablet, Take 1 tablet by mouth Daily., Disp: , Rfl:   •  aspirin  MG tablet, Take 1 tablet by mouth Daily., Disp: , Rfl:   •  atorvastatin (LIPITOR) 40 MG tablet, Take 1 tablet by mouth Every Night., Disp: , Rfl:   •  docusate sodium (Stool Softener) 100 MG capsule, Take 1 capsule by mouth 2 (Two) Times a Day As Needed for Constipation. 100mg, Disp: , Rfl:   •  ipratropium (ATROVENT) 0.06 % nasal spray, 2 sprays into the nostril(s) as directed by provider 3 (Three) Times a Day., Disp: , Rfl:   •  levothyroxine (SYNTHROID, LEVOTHROID) 75 MCG tablet, Take 1 tablet by mouth Daily., Disp: , Rfl:   •  lisinopril (PRINIVIL,ZESTRIL) 40 MG tablet, Take 1 tablet by mouth Daily., Disp: , Rfl:  "  •  Multiple Vitamins-Minerals (ICAPS AREDS 2 PO), Take  by mouth., Disp: , Rfl:   •  Multiple Vitamins-Minerals (MULTIVITAMIN ADULT PO), Take 1 tablet by mouth Daily., Disp: , Rfl:   •  nitroglycerin (NITROSTAT) 0.4 MG SL tablet, Place 1 tablet under the tongue Every 5 (Five) Minutes As Needed., Disp: , Rfl:   •  Omega-3 Fatty Acids (FISH OIL) 1200 MG capsule delayed-release, Take 1 capsule by mouth Daily., Disp: , Rfl:   •  tiotropium bromide-olodaterol (Stiolto Respimat) 2.5-2.5 MCG/ACT aerosol solution inhaler, Inhale 2 puffs Daily., Disp: 1 each, Rfl: 6  •  traZODone (DESYREL) 50 MG tablet, Take 1 tablet by mouth Every Night., Disp: , Rfl:         History of Present Illness    Patient returns today for follow-up of coronary artery disease.  Since her last visit he had a stress perfusion study done earlier this week which showed anterior infarction but no ischemia.  He is still relatively active, no change in functional capacity.  No chest pain dyspnea orthopnea or claudication.  No change in medication.    The following portions of the patient's history were reviewed and updated as appropriate: allergies, current medications, past family history, past medical history, past social history, past surgical history and problem list.      Social History     Tobacco Use   • Smoking status: Former     Packs/day: 1.00     Years: 10.00     Pack years: 10.00     Types: Cigarettes     Quit date:      Years since quittin.3   • Smokeless tobacco: Never   Vaping Use   • Vaping Use: Never used   Substance Use Topics   • Alcohol use: Not Currently     Comment: occassionally    • Drug use: No         ROS       Objective:   /80 (BP Location: Right arm, Patient Position: Sitting)   Pulse 71   Ht 177.8 cm (70\")   Wt 75.8 kg (167 lb)   SpO2 97%   BMI 23.96 kg/m²         Vitals reviewed.   Constitutional:       Appearance: Well-developed and not in distress.   Neck:      Thyroid: No thyromegaly.      Vascular: No " carotid bruit or JVD.   Pulmonary:      Breath sounds: Normal breath sounds.   Cardiovascular:      Regular rhythm.      No gallop. No S3 and S4 gallop.   Abdominal:      General: Bowel sounds are normal.      Palpations: Abdomen is soft. There is no abdominal mass.      Tenderness: There is no abdominal tenderness.   Musculoskeletal:         General: No deformity.      Extremities: No clubbing present.Skin:     General: Skin is warm and dry.      Findings: No rash.   Neurological:      Mental Status: Alert and oriented to person, place, and time.         Procedures          Assessment:   Assessment & Plan      Diagnoses and all orders for this visit:    1. Coronary artery disease involving native coronary artery of native heart without angina pectoris (Primary)    2. Dyslipidemia    3. Primary hypertension      1.  Coronary artery disease, stable no angina.  Prior anterior infarction with LVEF 42%.  No ischemia.  2.  Hypertension well-controlled on lisinopril  3.  Dyslipidemia on high intensity statin.  Last LDL mildly elevated all previous readings have been normal.    Recommendations:  1.  Continue current medical therapy  2.  Regarding the mildly elevated LDL we will simply follow for now with dietary changes as all previous readings have been normal.  Repeat with primary physician in the next 2 to 3 months, if still elevated will consider increasing statin dose at that time.  3.  Revisit annually apparent symptom change           Advance Care Planning   ACP discussion was held with the patient during this visit. Patient has an advance directive (not in EMR), copy requested.      Shaun Joseph MD    Dictated utilizing Dragon dictation

## 2023-05-10 ENCOUNTER — OFFICE VISIT (OUTPATIENT)
Dept: CARDIOLOGY | Facility: CLINIC | Age: 73
End: 2023-05-10
Payer: MEDICARE

## 2023-05-10 VITALS
WEIGHT: 167 LBS | OXYGEN SATURATION: 97 % | HEIGHT: 70 IN | DIASTOLIC BLOOD PRESSURE: 80 MMHG | BODY MASS INDEX: 23.91 KG/M2 | SYSTOLIC BLOOD PRESSURE: 120 MMHG | HEART RATE: 71 BPM

## 2023-05-10 DIAGNOSIS — E78.5 DYSLIPIDEMIA: ICD-10-CM

## 2023-05-10 DIAGNOSIS — I25.10 CORONARY ARTERY DISEASE INVOLVING NATIVE CORONARY ARTERY OF NATIVE HEART WITHOUT ANGINA PECTORIS: Primary | ICD-10-CM

## 2023-05-10 DIAGNOSIS — I10 PRIMARY HYPERTENSION: ICD-10-CM

## 2023-05-10 PROCEDURE — 1159F MED LIST DOCD IN RCRD: CPT | Performed by: INTERNAL MEDICINE

## 2023-05-10 PROCEDURE — 1160F RVW MEDS BY RX/DR IN RCRD: CPT | Performed by: INTERNAL MEDICINE

## 2023-05-10 PROCEDURE — 99213 OFFICE O/P EST LOW 20 MIN: CPT | Performed by: INTERNAL MEDICINE

## 2023-05-10 PROCEDURE — 3074F SYST BP LT 130 MM HG: CPT | Performed by: INTERNAL MEDICINE

## 2023-05-10 PROCEDURE — 3079F DIAST BP 80-89 MM HG: CPT | Performed by: INTERNAL MEDICINE

## 2023-05-31 NOTE — PROGRESS NOTES
[facility] Cardiology  Subjective:     Encounter Date: 05/03/2021      Patient ID: Gee Whitney is a 70 y.o. male.    Chief Complaint: Coronary Artery Disease      PROBLEM LIST:  1. Coronary artery disease:  a. [facility] for MI, 1977, Dr. Carlos Gilmore: Occluded LAD with reported collaterals per patient report, normal LVEF.  b. MPS, 2014: EF 50%, anterior apical scar. No ischemia.  c. US aorta, 04/27/2016: No abdominal aortic aneurysm.  d. Exercise MPS, 4/1/2019: prior infarction of the anterior apex, and anterior septum with no significant ischemia noted. EF 40%. Severe hypokinesis.  2. Hypertension.   3. Dyslipidemia.  4. History of elevated LFTs.   5. Traumatic hemothorax 2018  6. Renal cyst.  7. Hypothyroidism.  8. Bilateral cataract surgery.    9. Remote tobacco abuse.      History of Present Illness  Gee Whitney returns today for an annual follow up with a history of coronary artery disease and cardiac risk factors. Since last visit, he has been doing well overall from a cardiovascular standpoint. He is able to stay physically active without any issues. His blood pressure fluctuates between doctor visits and some physicians mention changing his hypertension medications but hasn't moved forward with any changes. He has received both of his COVID-19 immunizations. Patient denies chest pain, shortness of breath, palpitations, edema, dizziness, and syncope.       No Known Allergies      Current Outpatient Medications:   •  aspirin  MG tablet, Take 325 mg by mouth Daily., Disp: , Rfl:   •  atorvastatin (LIPITOR) 20 MG tablet, Take 20 mg by mouth Daily., Disp: , Rfl:   •  cholecalciferol (VITAMIN D3) 1000 UNITS tablet, Take 1,000 Units by mouth Daily., Disp: , Rfl:   •  Docusate Calcium (STOOL SOFTENER PO), Take 1 tablet by mouth Daily., Disp: , Rfl:   •  ezetimibe (ZETIA) 10 MG tablet, Take 10 mg by mouth Daily., Disp: , Rfl:   •  levothyroxine (SYNTHROID, LEVOTHROID)  Wheelchair-bound at baseline, but transfers to chair  Subacute decline over the last 4-6 weeks per discussion with family at bedside  Unable to sit upright for full esophagram  PT and OT ordered        "75 MCG tablet, Take 75 mcg by mouth Daily., Disp: , Rfl:   •  lisinopril (PRINIVIL,ZESTRIL) 30 MG tablet, Take 30 mg by mouth Daily., Disp: , Rfl:   •  Multiple Vitamins-Minerals (ICAPS AREDS 2 PO), Take  by mouth., Disp: , Rfl:   •  Multiple Vitamins-Minerals (MULTIVITAMIN ADULT PO), Take 1 tablet by mouth Daily., Disp: , Rfl:   •  nitroglycerin (NITROSTAT) 0.4 MG SL tablet, Place 0.4 mg under the tongue Every 5 (Five) Minutes As Needed., Disp: , Rfl:   •  Omega-3 Fatty Acids (FISH OIL) 1200 MG capsule delayed-release, Take 1,200 mg by mouth Daily., Disp: , Rfl:     The following portions of the patient's history were reviewed and updated as appropriate: allergies, current medications, past family history, past medical history, past social history, past surgical history and problem list.    Review of Systems   Constitutional: Negative.   Cardiovascular: Negative for chest pain, dyspnea on exertion, leg swelling, palpitations and syncope.   Respiratory: Negative.  Negative for shortness of breath.    Hematologic/Lymphatic: Negative for bleeding problem. Does not bruise/bleed easily.   Skin: Negative for rash.   Musculoskeletal: Negative for muscle weakness and myalgias.   Gastrointestinal: Negative for heartburn, nausea and vomiting.   Neurological: Negative for dizziness, light-headedness, loss of balance and numbness.          Objective:     Vitals:    05/03/21 1500   BP: 130/70   BP Location: Left arm   Patient Position: Sitting   Pulse: 60   Temp: 98.7 °F (37.1 °C)   Weight: 77.1 kg (170 lb)   Height: 177.8 cm (70\")         Constitutional:       Appearance: Well-developed.   Neck:      Thyroid: No thyromegaly.      Vascular: No carotid bruit or JVD.   Pulmonary:      Breath sounds: Normal breath sounds.   Cardiovascular:      Regular rhythm.      No gallop. No S3 and S4 gallop.   Edema:     Peripheral edema absent.   Abdominal:      General: Bowel sounds are normal.      Palpations: Abdomen is soft. There is no " abdominal mass.      Tenderness: There is no abdominal tenderness.   Skin:     General: Skin is warm and dry.      Findings: No rash.   Neurological:      Mental Status: Alert and oriented to person, place, and time.         Lab Review:    Lab date: 2/3/2021  • FLP: , , HDL 45.8, LDL 80  • CMP: Glu 105, BUN 24, Creat 1.55, eGFR 44, Na 145, K 4.8, Cl 106, CO2 26, Ca 9.8, Alk Phos 62, AST 19, ALT 14       Procedures     Advance Care Planning   ACP discussion was held with the patient during this visit. Patient has an advance directive (not in EMR), copy requested.          Assessment:   Diagnoses and all orders for this visit:    1. Coronary artery disease involving native coronary artery of native heart without angina pectoris (Primary)    2. Essential hypertension    3. Dyslipidemia        Impression:  1. Coronary artery disease. Stable without recurrent chest pain. On aspirin daily and nitroglycerin as needed for chest pain.   2. Hypertension. Well controlled. On lisinopril.   3. Dyslipidemia. Acceptable control.  LDL 80 on Zetia and atorvastatin.  No change since stopping the fibrate.    Plan:  1. Discontinue Zetia and increase atorvastatin from 20 mg to 40 mg daily.  To ease medical burden  2. Check fasting lipid profile 3 months after the above change.  (Through primary care physician).  3. Continue other current medications.  4. Revisit in 12 MO, or sooner as needed.    Scribed for Shaun Joseph MD by Meagan Kirkland. 5/3/2021 15:16 EDT        Shaun Joseph MD      Please note that portions of this note may have been completed with a voice recognition program. Efforts were made to edit the dictations, but occasionally words are mistranscribed.     Statement Selected

## 2023-12-27 ENCOUNTER — OFFICE VISIT (OUTPATIENT)
Dept: PULMONOLOGY | Facility: CLINIC | Age: 73
End: 2023-12-27
Payer: MEDICARE

## 2023-12-27 VITALS
OXYGEN SATURATION: 95 % | WEIGHT: 167 LBS | DIASTOLIC BLOOD PRESSURE: 62 MMHG | HEART RATE: 77 BPM | SYSTOLIC BLOOD PRESSURE: 122 MMHG | TEMPERATURE: 97.8 F | HEIGHT: 70 IN | BODY MASS INDEX: 23.91 KG/M2

## 2023-12-27 DIAGNOSIS — J31.0 CHRONIC RHINITIS: ICD-10-CM

## 2023-12-27 DIAGNOSIS — R05.3 CHRONIC COUGH: Primary | ICD-10-CM

## 2023-12-27 PROBLEM — N17.9 AKI (ACUTE KIDNEY INJURY): Status: RESOLVED | Noted: 2023-02-14 | Resolved: 2023-12-27

## 2023-12-27 PROBLEM — J18.9 COMMUNITY ACQUIRED PNEUMONIA OF RIGHT LOWER LOBE OF LUNG: Status: RESOLVED | Noted: 2018-03-07 | Resolved: 2023-12-27

## 2023-12-27 PROBLEM — J94.2 HEMOTHORAX: Status: RESOLVED | Noted: 2018-03-12 | Resolved: 2023-12-27

## 2023-12-27 PROCEDURE — 3078F DIAST BP <80 MM HG: CPT | Performed by: NURSE PRACTITIONER

## 2023-12-27 PROCEDURE — 3074F SYST BP LT 130 MM HG: CPT | Performed by: NURSE PRACTITIONER

## 2023-12-27 PROCEDURE — 99214 OFFICE O/P EST MOD 30 MIN: CPT | Performed by: NURSE PRACTITIONER

## 2023-12-27 RX ORDER — TIOTROPIUM BROMIDE AND OLODATEROL 3.124; 2.736 UG/1; UG/1
2 SPRAY, METERED RESPIRATORY (INHALATION) DAILY PRN
Qty: 1 EACH | Refills: 3 | Status: SHIPPED | OUTPATIENT
Start: 2023-12-27

## 2023-12-27 RX ORDER — ALBUTEROL SULFATE 90 UG/1
2 AEROSOL, METERED RESPIRATORY (INHALATION) EVERY 4 HOURS PRN
Qty: 18 G | Refills: 3 | Status: SHIPPED | OUTPATIENT
Start: 2023-12-27

## 2023-12-27 RX ORDER — ERGOCALCIFEROL 1.25 MG/1
50000 CAPSULE ORAL WEEKLY
COMMUNITY

## 2023-12-27 RX ORDER — TIOTROPIUM BROMIDE AND OLODATEROL 3.124; 2.736 UG/1; UG/1
2 SPRAY, METERED RESPIRATORY (INHALATION) AS NEEDED
Qty: 1 EACH | Refills: 3 | Status: SHIPPED | OUTPATIENT
Start: 2023-12-27 | End: 2023-12-27 | Stop reason: SDUPTHER

## 2023-12-27 NOTE — PROGRESS NOTES
"[facility] Pulmonary Follow up      Chief Complaint  Follow-up    Subjective          Gee Whitney presents to [facility] for routine follow-up, he was last seen by Dr. Brooke about a year ago.  He has a history of some recurrent pneumonia and chronic bronchitis.  He has done well recently without any acute issues.  He does have a bit of a chronic cough, he takes Stiolto Respimat as needed maybe once or twice a week.  His cough is usually worse during seasonal changes.  He does have a bit of postnasal drainage and chronic rhinitis.        Objective     Vital Signs:   /62 (BP Location: Left arm, Patient Position: Sitting, Cuff Size: Adult)   Pulse 77   Temp 97.8 °F (36.6 °C)   Ht 177.8 cm (70\")   Wt 75.8 kg (167 lb)   SpO2 95% Comment: Room air at rest  BMI 23.96 kg/m²       Immunization History   Administered Date(s) Administered    Arexvy (RSV, Adults 60+ yrs) 12/01/2023    COVID-19 (PFIZER) BIVALENT 12+YRS 09/22/2022    Fluad Quad 65+ 09/21/2021    Fluzone (or Fluarix & Flulaval for VFC) >6mos 09/26/2017    Fluzone High Dose =>65 Years ([employer]axcare ONLY) 09/27/2016, 10/20/2017, 09/10/2019, 09/13/2023    Hepatitis A 07/10/2019    Influenza, Unspecified 09/10/2018, 01/15/2020    Pneumococcal Polysaccharide (PPSV23) 11/08/2021    Shingrix 08/23/2023       Physical Exam  Vitals reviewed.   Constitutional:       Appearance: He is well-developed.   HENT:      Head: Normocephalic and atraumatic.   Eyes:      Pupils: Pupils are equal, round, and reactive to light.   Cardiovascular:      Rate and Rhythm: Normal rate and regular rhythm.      Heart sounds: No murmur heard.  Pulmonary:      Effort: Pulmonary effort is normal. No respiratory distress.      Breath sounds: Normal breath sounds. No wheezing or rales.   Abdominal:      General: Bowel sounds are normal. There is no distension.      Palpations: Abdomen is soft.   Musculoskeletal:         General: " Normal range of motion.      Cervical back: Normal range of motion and neck supple.   Skin:     General: Skin is warm and dry.      Findings: No erythema.   Neurological:      Mental Status: He is alert and oriented to person, place, and time.   Psychiatric:         Behavior: Behavior normal.          Result Review :                           Assessment and Plan    Problem List Items Addressed This Visit          ENT    Chronic rhinitis       Pulmonary and Pneumonias    Chronic cough - Primary       At this time his cough is very controlled with Stiolto as needed.  He also has an albuterol rescue inhaler he takes with him when he is out that he may be uses once or twice a month.    He has not had any recent acute infections or exacerbations of his history of chronic bronchitis.    Continue on his antihistamine and nasal sprays to help with his chronic nasal drainage.    Follow Up     No follow-ups on file.  Patient was given instructions and counseling regarding his condition or for health maintenance advice. Please see specific information pulled into the AVS if appropriate.     Moderate level of Medical Decision Making complexity based on 2 or more chronic stable illnesses and prescription drug management.    GAURI Myers, ACNP  [facility] Medicine  Electronically signed

## 2024-05-13 ENCOUNTER — OFFICE VISIT (OUTPATIENT)
Dept: CARDIOLOGY | Facility: CLINIC | Age: 74
End: 2024-05-13
Payer: MEDICARE

## 2024-05-13 VITALS
WEIGHT: 167.6 LBS | HEIGHT: 70 IN | OXYGEN SATURATION: 97 % | BODY MASS INDEX: 23.99 KG/M2 | SYSTOLIC BLOOD PRESSURE: 122 MMHG | DIASTOLIC BLOOD PRESSURE: 68 MMHG | HEART RATE: 60 BPM

## 2024-05-13 DIAGNOSIS — E78.5 DYSLIPIDEMIA: ICD-10-CM

## 2024-05-13 DIAGNOSIS — I25.5 ISCHEMIC CARDIOMYOPATHY: ICD-10-CM

## 2024-05-13 DIAGNOSIS — I10 ESSENTIAL HYPERTENSION: ICD-10-CM

## 2024-05-13 DIAGNOSIS — I25.10 CORONARY ARTERY DISEASE INVOLVING NATIVE CORONARY ARTERY OF NATIVE HEART WITHOUT ANGINA PECTORIS: Primary | ICD-10-CM

## 2024-05-13 DIAGNOSIS — I10 ESSENTIAL HYPERTENSION: Primary | ICD-10-CM

## 2024-05-13 PROCEDURE — 3078F DIAST BP <80 MM HG: CPT | Performed by: INTERNAL MEDICINE

## 2024-05-13 PROCEDURE — 1160F RVW MEDS BY RX/DR IN RCRD: CPT | Performed by: INTERNAL MEDICINE

## 2024-05-13 PROCEDURE — 3074F SYST BP LT 130 MM HG: CPT | Performed by: INTERNAL MEDICINE

## 2024-05-13 PROCEDURE — 1159F MED LIST DOCD IN RCRD: CPT | Performed by: INTERNAL MEDICINE

## 2024-05-13 PROCEDURE — 99214 OFFICE O/P EST MOD 30 MIN: CPT | Performed by: INTERNAL MEDICINE

## 2024-05-13 RX ORDER — DOXAZOSIN MESYLATE 1 MG/1
1 TABLET ORAL NIGHTLY
COMMUNITY

## 2024-05-13 RX ORDER — FUROSEMIDE 20 MG/1
20 TABLET ORAL AS NEEDED
COMMUNITY

## 2024-05-13 RX ORDER — LOSARTAN POTASSIUM 100 MG/1
100 TABLET ORAL DAILY
COMMUNITY

## 2024-05-13 NOTE — PROGRESS NOTES
[facility] Cardiology  Subjective:     Encounter Date: 05/13/2024      Patient ID: Gee Whitney is a 73 y.o. male.    Chief Complaint: Coronary artery disease involving native coronary artery of      PROBLEM LIST:  Coronary artery disease:  [facility] for MI, 1977, Dr. Carlos Gilmore: Occluded LAD with reported collaterals per patient report, normal LVEF.  MPS, 2014: EF 50%, anterior apical scar. No ischemia.  US aorta, 04/27/2016: No abdominal aortic aneurysm.  Exercise MPS, 4/1/2019: prior infarction of the anterior apex, and anterior septum with no significant ischemia noted. EF 40%. Severe hypokinesis.  5/9/2023 MPS with infarct of anterior and significant ischemia.  EF 32%.  Severe hypokinesis of anterior and septal wall.  Hypertension.   Dyslipidemia.  COPD  COVID-19 1/2022  Did not require hospitalization  History of elevated LFTs.   Traumatic hemothorax 2018  Renal cyst.  Hypothyroidism.  Bilateral cataract surgery.    Remote tobacco abuse.    History of Present Illness  Gee Whitney returns today for a 1 year follow up with a history of CAD and cardiac risk factors. Since last visit, patient has been doing well overall from a cardiovascular standpoint. However, he has been experiencing swelling in his legs the past couple weeks. Patient states the swelling occurs during the day and resolves when he does to bed. He is being followed by a nephrologist. Patient wears compression stockings daily. Patient denies chest pain, shortness of breath, orthopnea, palpitations, dizziness, and syncope.     No Known Allergies      Current Outpatient Medications:     albuterol sulfate  (90 Base) MCG/ACT inhaler, Inhale 2 puffs Every 4 (Four) Hours As Needed for Wheezing., Disp: 18 g, Rfl: 3    amLODIPine (NORVASC) 2.5 MG tablet, Take 2 tablets by mouth Daily., Disp: , Rfl:     aspirin  MG tablet, Take 1 tablet by mouth Daily., Disp: , Rfl:     atorvastatin (LIPITOR) 40 MG  Its ok with me.    "tablet, Take 1 tablet by mouth Every Night., Disp: , Rfl:     doxazosin (CARDURA) 1 MG tablet, Take 1 tablet by mouth Every Night., Disp: , Rfl:     furosemide (LASIX) 20 MG tablet, Take 1 tablet by mouth As Needed., Disp: , Rfl:     ipratropium (ATROVENT) 0.06 % nasal spray, 2 sprays into the nostril(s) as directed by provider 3 (Three) Times a Day., Disp: , Rfl:     levothyroxine (SYNTHROID, LEVOTHROID) 75 MCG tablet, Take 1 tablet by mouth Daily., Disp: , Rfl:     losartan (COZAAR) 100 MG tablet, Take 1 tablet by mouth Daily., Disp: , Rfl:     Multiple Vitamins-Minerals (ICAPS AREDS 2 PO), Take 2 tablets by mouth Daily., Disp: , Rfl:     nitroglycerin (NITROSTAT) 0.4 MG SL tablet, Place 1 tablet under the tongue Every 5 (Five) Minutes As Needed., Disp: , Rfl:     Omega-3 Fatty Acids (FISH OIL) 1200 MG capsule delayed-release, Take 1 capsule by mouth Daily., Disp: , Rfl:     Polyethylene Glycol 3350 (MIRALAX PO), Take  by mouth Every Other Day. Mixes with orange juice, Disp: , Rfl:     tiotropium bromide-olodaterol (Stiolto Respimat) 2.5-2.5 MCG/ACT aerosol solution inhaler, Inhale 2 puffs Daily As Needed (cough)., Disp: 1 each, Rfl: 3    traZODone (DESYREL) 50 MG tablet, Take 1 tablet by mouth Every Night., Disp: , Rfl:     vitamin D (ERGOCALCIFEROL) 1.25 MG (71354 UT) capsule capsule, Take 1 capsule by mouth 1 (One) Time Per Week., Disp: , Rfl:     The following portions of the patient's history were reviewed and updated as appropriate: allergies, current medications, past family history, past medical history, past social history, past surgical history and problem list.    ROS       Objective:     Vitals:    05/13/24 1009   BP: 122/68   BP Location: Left arm   Patient Position: Sitting   Pulse: 60   SpO2: 97%   Weight: 76 kg (167 lb 9.6 oz)   Height: 177.8 cm (70\")         Vitals reviewed.   Constitutional:       Appearance: Well-developed and not in distress.   Neck:      Thyroid: No thyromegaly.      Vascular: " No carotid bruit or JVD.   Pulmonary:      Breath sounds: Normal breath sounds.   Cardiovascular:      Regular rhythm.      No gallop. No S3 and S4 gallop.   Pulses:     Intact distal pulses.      Carotid: 2+ bilaterally.     Radial: 2+ bilaterally.  Edema:     Peripheral edema absent.   Abdominal:      General: Bowel sounds are normal.      Palpations: Abdomen is soft. There is no abdominal mass.      Tenderness: There is no abdominal tenderness.   Musculoskeletal:         General: No deformity.      Extremities: No clubbing present.Skin:     General: Skin is warm and dry.      Findings: No rash.   Neurological:      Mental Status: Alert and oriented to person, place, and time.         Lab Review:  Lab date: 02/22/2024  FLP: , , HDL 41, LDL 84  CMP: Glu 100, BUN 29, Creat 1.51, eGFR 45, Na 143, K 4.6, Cl 106, CO2 25, Ca 9.5, Alk Phos 62, AST 24, ALT 21  CBC: WBC 7.2, RBC 4.91, HGB 14.9, HCT 44.9, MCV 91, MCH 30.3,   HbA1c: 5.9    Procedures      Advance Care Planning   ACP discussion was held with the patient during this visit. Patient does not have an advance directive, information provided.           Assessment:   Diagnoses and all orders for this visit:    1. Coronary artery disease involving native coronary artery of native heart without angina pectoris (Primary)    2. Essential hypertension    3. Dyslipidemia        Impression:  1. Coronary artery disease. Stable without angina on current activity. Continue on aspirin 325 mg for antiplatelet therapy. Continue on nitroglycerin 0.4 mg PRN for chest pain.    2. Essential hypertension. Well controlled. Continue on amlodipine 5 mg daily for hypertension. Continue on Lasix 20 mg PRN for fluid retention. Continue on losartan 100 mg daily for hypertension.     3. Dyslipidemia. Well controlled. Continue on atorvastatin 40 mg daily for hyperlipidemia.     4.  Lower extremity edema without any evidence of left or right heart failure.  Almost certainly  due to the increase in amlodipine dosage.    Plan:  Stable cardiac status.   Continue current medications.  Check echocardiogram prior to next visit given decreased by MPS, which is new finding.  Asymptomatic.  Revisit in 12 MO, or sooner as needed.          Scribed for Shaun Joseph MD by Richard Edmonds. 5/13/2024 11:12 EDT  Shaun Joseph MD    Please note that portions of this note may have been completed with a voice recognition program. Efforts were made to edit the dictations, but occasionally words are mistranscribed.

## 2025-01-08 ENCOUNTER — OFFICE VISIT (OUTPATIENT)
Dept: PULMONOLOGY | Facility: CLINIC | Age: 75
End: 2025-01-08
Payer: MEDICARE

## 2025-01-08 VITALS
WEIGHT: 172 LBS | TEMPERATURE: 98.9 F | SYSTOLIC BLOOD PRESSURE: 142 MMHG | HEART RATE: 76 BPM | BODY MASS INDEX: 24.62 KG/M2 | OXYGEN SATURATION: 94 % | DIASTOLIC BLOOD PRESSURE: 78 MMHG | HEIGHT: 70 IN

## 2025-01-08 DIAGNOSIS — R05.3 CHRONIC COUGH: ICD-10-CM

## 2025-01-08 DIAGNOSIS — J31.0 CHRONIC RHINITIS: Primary | ICD-10-CM

## 2025-01-08 PROCEDURE — 1160F RVW MEDS BY RX/DR IN RCRD: CPT | Performed by: INTERNAL MEDICINE

## 2025-01-08 PROCEDURE — 3078F DIAST BP <80 MM HG: CPT | Performed by: INTERNAL MEDICINE

## 2025-01-08 PROCEDURE — 99214 OFFICE O/P EST MOD 30 MIN: CPT | Performed by: INTERNAL MEDICINE

## 2025-01-08 PROCEDURE — 1159F MED LIST DOCD IN RCRD: CPT | Performed by: INTERNAL MEDICINE

## 2025-01-08 PROCEDURE — 3077F SYST BP >= 140 MM HG: CPT | Performed by: INTERNAL MEDICINE

## 2025-01-08 RX ORDER — TIOTROPIUM BROMIDE AND OLODATEROL 3.124; 2.736 UG/1; UG/1
2 SPRAY, METERED RESPIRATORY (INHALATION) DAILY PRN
Qty: 1 EACH | Refills: 3 | Status: SHIPPED | OUTPATIENT
Start: 2025-01-08

## 2025-01-08 NOTE — PROGRESS NOTES
Follow Up Office Visit      Patient Name: Gee Whitney    Chief Complaint:    Chief Complaint   Patient presents with    Shortness of Breath       History of Present Illness: Gee Whitney is a 74 y.o. male who is here today for follow up of Chronic cough    74-year-old male with a remote history of smoking and quit back in 1977 presenting with complains of chronic cough which has been going on for long time.  Patient has history of coronary disease, hypothyroidism, dyslipidemia and has also been labeled as COPD.  Patient also has history of right-sided hemothorax for which she was hospitalized back in 2018 and underwent chest tube drainage.  No malignancy noted at that time.  Hemothorax was thought to be due to worsening cough because of pleural hemorrhage from increased pressures.  He states that he was in usual state of health up until November when he started having bronchitic symptoms with cough and purulent sputum.  He was given Z-Danielito.  He also was having sinus congestion at that time. He subsequently went to [place] to visit family around [place] time and 1 December 27 he had another visit to urgent care there where he was given Z-Danielito and Tessalon Perles.  States that did not do much for him then on January 3 he was seen here had was treated with prednisone and doxycycline.  Prednisone seem to have helped his cough a little.  At that time he was also found to be influenza positive but apparently was not given any antiviral agents. No pets at home.  No carpets.  No swimming pool or hot tub use.  He used to work in  for [employer] and states that environment was aditya but denies any other environmental exposures.  Occasional alcohol use no illicit drug use.  No other sick contacts.  No secondhand smoke exposure either.  No black mold exposure.       Patient was placed on Stiolto inhaler.  He is here for follow-up.  He states that his cough is significantly improved.  His wife  "corroborates that history and states that he does not cough like he used to before.  He is still has cough in the morning where he will bring up thick sputum.  Patient states that he is having issues with intermittent nosebleeds as well.  States that house is very dry And when he wakes up in the morning he feels a sore throat.  Denies any hemoptysis.  Denies any wheezing or chest tightness.  States that cough is occasional and when he has cough he uses his Stiolto inhaler and that helps control it.  Denies any persistent cough.  Denies any fevers chills.      Subjective      Review of Systems:   Review of Systems   Constitutional: Negative.    HENT:  Positive for nosebleeds.    Respiratory:  Positive for cough (intermittent).    Cardiovascular: Negative.    Gastrointestinal: Negative.    Endocrine: Negative.    Musculoskeletal: Negative.    Skin: Negative.    Neurological: Negative.    Hematological: Negative.    Psychiatric/Behavioral: Negative.     All other systems reviewed and are negative.      The following portions of the patient's history were reviewed and updated as appropriate: allergies, current medications, past family history, past medical history, past social history, past surgical history and problem list.    Objective     Physical Exam:  Vital Signs:   Vitals:    01/08/25 0818   BP: 142/78   Pulse: 76   Temp: 98.9 °F (37.2 °C)   TempSrc: Infrared   SpO2: 94%  Comment: room air at rest   Weight: 78 kg (172 lb)   Height: 177.8 cm (70\")     Body mass index is 24.68 kg/m².    Physical Exam  Vitals and nursing note reviewed.   Constitutional:       General: He is not in acute distress.     Appearance: He is well-developed. He is not diaphoretic.   HEENT:    No nasal turbinate hypertrophy or bogginess, no sinus tenderness  Cardiovascular:      Rate and Rhythm: Normal rate and regular rhythm.      Heart sounds: Normal heart sounds. No murmur heard.    No friction rub. No gallop.   Pulmonary:      Effort: " Pulmonary effort is normal. No respiratory distress.      Breath sounds: Normal breath sounds. No wheezing or rales.   Musculoskeletal:         Right lower leg: No edema.      Left lower leg: No edema.   Neurological:      Mental Status: He is alert and oriented to person, place, and time.      Cranial Nerves: No cranial nerve deficit.      Coordination: Coordination normal.   Psychiatric:         Behavior: Behavior normal.         Thought Content: Thought content normal.         Judgment: Judgment normal.          Results Review:   I reviewed the patient's new clinical results.    Previous PFTs were normal.    Assessment / Plan      Assessment:   Problem List Items Addressed This Visit          ENT    Chronic rhinitis - Primary       Pulmonary and Pneumonias    Chronic cough    Relevant Medications    tiotropium bromide-olodaterol (Stiolto Respimat) 2.5-2.5 MCG/ACT aerosol solution inhaler       Plan:   1.  Patient with likely chronic bronchitis leading to chronic cough symptoms.  Symptoms have improved significantly with Stiolto inhaler.  He is feeling much better and now using Stiolto inhaler only intermittently when he has cough.  Denies any seasonal change to the cough.  Denies any history suggestive of asthmatic picture either.  Overall feeling better.  Stiolto inhaler renewed.  Denies any side effects to medications.  2.  Previous PFTs were normal.  Previous chest x-ray did not show any other untoward abnormality either.  Will recheck again on next visit.  3.  Likely has chronic rhinitis.  May be dryness at home causing problems.  Advised patient to check humidity level at home and if it is less than 40% then they can run cool-mist humidifier at night.  Wife noncemented for during daytime.  Also advised to use some nasal saline spray to see if that will help with nasal dryness and nosebleeds.  I do not see any evidence of nasal congestion or chronic sinusitis picture either.  Will monitor for now.  4.   Up-to-date on influenza and pneumonia vaccine.  Will consider Prevnar 20 immunization next visit.  Up-to-date on RSV vaccine as well.     we will follow in 8 to 9 months or sooner as needed.  Prescriptions renewed.    Follow Up:   8 to 9 months with chest x-ray    Discussed plan of care in detail with patient today. Patient verbally understands and agrees.     Gab Brooke MD  Pulmonary Critical Care and Sleep Medicine

## 2025-05-09 ENCOUNTER — RESULTS FOLLOW-UP (OUTPATIENT)
Dept: CARDIOLOGY | Facility: CLINIC | Age: 75
End: 2025-05-09

## 2025-05-09 ENCOUNTER — HOSPITAL ENCOUNTER (OUTPATIENT)
Facility: HOSPITAL | Age: 75
Discharge: HOME | End: 2025-05-09
Payer: MEDICARE

## 2025-05-09 VITALS — WEIGHT: 172 LBS | HEIGHT: 70 IN | BODY MASS INDEX: 24.62 KG/M2

## 2025-05-09 DIAGNOSIS — I50.9 HEART FAILURE, UNSPECIFIED HF CHRONICITY, UNSPECIFIED HEART FAILURE TYPE: ICD-10-CM

## 2025-05-09 DIAGNOSIS — I25.5 ISCHEMIC CARDIOMYOPATHY: Primary | ICD-10-CM

## 2025-05-09 DIAGNOSIS — I10 ESSENTIAL HYPERTENSION: ICD-10-CM

## 2025-05-09 DIAGNOSIS — I25.5 ISCHEMIC CARDIOMYOPATHY: ICD-10-CM

## 2025-05-09 LAB
AORTIC DIMENSIONLESS INDEX: 0.56 (DI)
AV MEAN PRESS GRAD SYS DOP V1V2: 4.5 MMHG
AV VMAX SYS DOP: 135 CM/SEC
BH CV ECHO MEAS - AO MAX PG: 7.3 MMHG
BH CV ECHO MEAS - AO ROOT DIAM: 2.9 CM
BH CV ECHO MEAS - AO V2 VTI: 30.9 CM
BH CV ECHO MEAS - AVA(I,D): 2.33 CM2
BH CV ECHO MEAS - EDV(CUBED): 148.9 ML
BH CV ECHO MEAS - EDV(MOD-SP2): 107 ML
BH CV ECHO MEAS - EDV(MOD-SP4): 120 ML
BH CV ECHO MEAS - EF(MOD-SP2): 53.6 %
BH CV ECHO MEAS - EF(MOD-SP4): 31.7 %
BH CV ECHO MEAS - ESV(CUBED): 68.9 ML
BH CV ECHO MEAS - ESV(MOD-SP2): 49.7 ML
BH CV ECHO MEAS - ESV(MOD-SP4): 81.9 ML
BH CV ECHO MEAS - FS: 22.6 %
BH CV ECHO MEAS - IVS/LVPW: 0.91 CM
BH CV ECHO MEAS - IVSD: 1 CM
BH CV ECHO MEAS - LA DIMENSION: 3.4 CM
BH CV ECHO MEAS - LAT PEAK E' VEL: 9.1 CM/SEC
BH CV ECHO MEAS - LV DIASTOLIC VOL/BSA (35-75): 61.5 CM2
BH CV ECHO MEAS - LV MASS(C)D: 213.9 GRAMS
BH CV ECHO MEAS - LV MAX PG: 2.43 MMHG
BH CV ECHO MEAS - LV MEAN PG: 1 MMHG
BH CV ECHO MEAS - LV SYSTOLIC VOL/BSA (12-30): 42 CM2
BH CV ECHO MEAS - LV V1 MAX: 78 CM/SEC
BH CV ECHO MEAS - LV V1 VTI: 17.3 CM
BH CV ECHO MEAS - LVIDD: 5.3 CM
BH CV ECHO MEAS - LVIDS: 4.1 CM
BH CV ECHO MEAS - LVOT AREA: 4.2 CM2
BH CV ECHO MEAS - LVOT DIAM: 2.3 CM
BH CV ECHO MEAS - LVPWD: 1.1 CM
BH CV ECHO MEAS - MED PEAK E' VEL: 6 CM/SEC
BH CV ECHO MEAS - MV A MAX VEL: 101 CM/SEC
BH CV ECHO MEAS - MV DEC SLOPE: 234 CM/SEC2
BH CV ECHO MEAS - MV DEC TIME: 0.26 SEC
BH CV ECHO MEAS - MV E MAX VEL: 61.3 CM/SEC
BH CV ECHO MEAS - MV E/A: 0.61
BH CV ECHO MEAS - MV MAX PG: 3.9 MMHG
BH CV ECHO MEAS - MV MEAN PG: 2 MMHG
BH CV ECHO MEAS - MV V2 VTI: 22.6 CM
BH CV ECHO MEAS - MVA(VTI): 3.2 CM2
BH CV ECHO MEAS - PA ACC TIME: 0.12 SEC
BH CV ECHO MEAS - PA V2 MAX: 119 CM/SEC
BH CV ECHO MEAS - PI END-D VEL: 105 CM/SEC
BH CV ECHO MEAS - RAP SYSTOLE: 3 MMHG
BH CV ECHO MEAS - RVSP: 27 MMHG
BH CV ECHO MEAS - SV(LVOT): 71.9 ML
BH CV ECHO MEAS - SV(MOD-SP2): 57.3 ML
BH CV ECHO MEAS - SV(MOD-SP4): 38.1 ML
BH CV ECHO MEAS - SVI(LVOT): 36.8 ML/M2
BH CV ECHO MEAS - SVI(MOD-SP2): 29.4 ML/M2
BH CV ECHO MEAS - SVI(MOD-SP4): 19.5 ML/M2
BH CV ECHO MEAS - TAPSE (>1.6): 2.33 CM
BH CV ECHO MEAS - TR MAX PG: 23.5 MMHG
BH CV ECHO MEAS - TR MAX VEL: 242.3 CM/SEC
BH CV ECHO MEASUREMENTS AVERAGE E/E' RATIO: 8.12
BH CV VAS BP LEFT ARM: NORMAL MMHG
BH CV XLRA - RV BASE: 3.8 CM
BH CV XLRA - RV LENGTH: 8.8 CM
BH CV XLRA - RV MID: 2.7 CM
BH CV XLRA - TDI S': 18.8 CM/SEC
IVRT: 130.5 MS
LEFT ATRIUM VOLUME INDEX: 29 ML/M2
LV EF 2D ECHO EST: 35 %

## 2025-05-09 PROCEDURE — 25010000002 SULFUR HEXAFLUORIDE MICROSPH 60.7-25 MG RECONSTITUTED SUSPENSION: Performed by: INTERNAL MEDICINE

## 2025-05-09 PROCEDURE — 93306 TTE W/DOPPLER COMPLETE: CPT

## 2025-05-09 PROCEDURE — 93306 TTE W/DOPPLER COMPLETE: CPT | Performed by: INTERNAL MEDICINE

## 2025-05-09 RX ADMIN — SULFUR HEXAFLUORIDE 5 ML: KIT at 09:40

## 2025-05-09 NOTE — TELEPHONE ENCOUNTER
Spoke with patient, advised of Dr. Joseph's recommendations regarding echo results.  He is agreeable to proceed with [facility].

## 2025-05-12 PROBLEM — I50.9 HEART FAILURE: Status: ACTIVE | Noted: 2025-05-09

## 2025-05-12 PROBLEM — I25.5 ISCHEMIC CARDIOMYOPATHY: Status: ACTIVE | Noted: 2025-05-09

## 2025-05-29 ENCOUNTER — OFFICE VISIT (OUTPATIENT)
Dept: CARDIOLOGY | Facility: CLINIC | Age: 75
End: 2025-05-29
Payer: MEDICARE

## 2025-05-29 VITALS
HEIGHT: 70 IN | DIASTOLIC BLOOD PRESSURE: 60 MMHG | HEART RATE: 72 BPM | OXYGEN SATURATION: 96 % | WEIGHT: 170.2 LBS | BODY MASS INDEX: 24.37 KG/M2 | SYSTOLIC BLOOD PRESSURE: 124 MMHG

## 2025-05-29 DIAGNOSIS — I10 ESSENTIAL HYPERTENSION: ICD-10-CM

## 2025-05-29 DIAGNOSIS — E78.5 DYSLIPIDEMIA: ICD-10-CM

## 2025-05-29 DIAGNOSIS — I20.0 PROGRESSIVE ANGINA: Primary | ICD-10-CM

## 2025-05-29 DIAGNOSIS — I25.5 ISCHEMIC CARDIOMYOPATHY: ICD-10-CM

## 2025-05-29 PROCEDURE — 93000 ELECTROCARDIOGRAM COMPLETE: CPT | Performed by: INTERNAL MEDICINE

## 2025-05-29 PROCEDURE — 1160F RVW MEDS BY RX/DR IN RCRD: CPT | Performed by: INTERNAL MEDICINE

## 2025-05-29 PROCEDURE — 3074F SYST BP LT 130 MM HG: CPT | Performed by: INTERNAL MEDICINE

## 2025-05-29 PROCEDURE — 99214 OFFICE O/P EST MOD 30 MIN: CPT | Performed by: INTERNAL MEDICINE

## 2025-05-29 PROCEDURE — 1159F MED LIST DOCD IN RCRD: CPT | Performed by: INTERNAL MEDICINE

## 2025-05-29 PROCEDURE — 3078F DIAST BP <80 MM HG: CPT | Performed by: INTERNAL MEDICINE

## 2025-05-29 RX ORDER — CARVEDILOL 6.25 MG/1
6.25 TABLET ORAL 2 TIMES DAILY
Qty: 60 TABLET | Refills: 11 | Status: SHIPPED | OUTPATIENT
Start: 2025-05-29

## 2025-05-29 NOTE — PROGRESS NOTES
Subjective:     Encounter Date:05/29/2025    Primary Care Physician: Pramod Blevins MD      Patient ID: Gee Whitney is a 74 y.o. male.    Chief Complaint:Coronary artery disease involving native coronary artery of      PROBLEM LIST:  Coronary artery disease:  [facility] for MI, 1977, Dr. Carlos Gilmore: Occluded LAD with reported collaterals per patient report, normal LVEF.  MPS, 2014: EF 50%, anterior apical scar. No ischemia.  US aorta, 04/27/2016: No abdominal aortic aneurysm.  Exercise MPS, 4/1/2019: prior infarction of the anterior apex, and anterior septum with no significant ischemia noted. EF 40%. Severe hypokinesis.  5/9/2023 MPS with infarct of anterior and significant ischemia.  EF 32%.  Severe hypokinesis of anterior and septal wall.  5/9/2025 ECHO EF 31-35%. No significant valve disease. With significant WMA of the anterior, septal and apical walls.   Hypertension.   Dyslipidemia.  COPD  COVID-19 1/2022  Did not require hospitalization  History of elevated LFTs.   Traumatic hemothorax 2018  Renal cyst.  Hypothyroidism.  Bilateral cataract surgery.    Remote tobacco abuse.      No Known Allergies      Current Outpatient Medications:     albuterol sulfate  (90 Base) MCG/ACT inhaler, Inhale 2 puffs Every 4 (Four) Hours As Needed for Wheezing., Disp: 18 g, Rfl: 3    amLODIPine (NORVASC) 2.5 MG tablet, Take 2 tablets by mouth Daily., Disp: , Rfl:     aspirin  MG tablet, Take 1 tablet by mouth Daily. (Patient taking differently: Take 1 tablet by mouth Every Other Day.), Disp: , Rfl:     atorvastatin (LIPITOR) 40 MG tablet, Take 1 tablet by mouth Every Night. (Patient taking differently: Take 1 tablet by mouth Every Other Day.), Disp: , Rfl:     doxazosin (CARDURA) 1 MG tablet, Take 1 tablet by mouth Every Night. (Patient taking differently: Take 4 tablets by mouth Every Night.), Disp: , Rfl:     furosemide (LASIX) 20 MG tablet, Take 1 tablet by mouth As Needed., Disp: , Rfl:      ipratropium (ATROVENT) 0.06 % nasal spray, Administer 2 sprays into the nostril(s) as directed by provider 3 (Three) Times a Day., Disp: , Rfl:     levothyroxine (SYNTHROID, LEVOTHROID) 75 MCG tablet, Take 1 tablet by mouth Daily., Disp: , Rfl:     losartan (COZAAR) 100 MG tablet, Take 1 tablet by mouth Daily., Disp: , Rfl:     Multiple Vitamins-Minerals (ICAPS AREDS 2 PO), Take 2 tablets by mouth Daily., Disp: , Rfl:     nitroglycerin (NITROSTAT) 0.4 MG SL tablet, Place 1 tablet under the tongue Every 5 (Five) Minutes As Needed., Disp: , Rfl:     Omega-3 Fatty Acids (FISH OIL) 1200 MG capsule delayed-release, Take 1 capsule by mouth Daily., Disp: , Rfl:     Polyethylene Glycol 3350 (MIRALAX PO), Take  by mouth Every Other Day. Mixes with orange juice, Disp: , Rfl:     tiotropium bromide-olodaterol (Stiolto Respimat) 2.5-2.5 MCG/ACT aerosol solution inhaler, Inhale 2 puffs Daily As Needed (cough)., Disp: 1 each, Rfl: 3    traZODone (DESYREL) 50 MG tablet, Take 1 tablet by mouth Every Night., Disp: , Rfl:     vitamin D (ERGOCALCIFEROL) 1.25 MG (01232 UT) capsule capsule, Take 1 capsule by mouth 1 (One) Time Per Week., Disp: , Rfl:         History of Present Illness    Patient is a 74 year old  male who we are seeing today for further evaluation after recent ECHO showing further decreased LVEF. Over the course of the last 4 months patient has noted increased shortness of breath with exertion and easy fatigue. Denies any chest pain, pressure or tightness. Given the decrease in LVEF the patient was asked to come in for further evaluation. Patient notes that his symptoms are limiting his usual activities.     The following portions of the patient's history were reviewed and updated as appropriate: allergies, current medications, past family history, past medical history, past social history, past surgical history and problem list.      Social History     Tobacco Use    Smoking status: Former     Current  "packs/day: 0.00     Average packs/day: 1 pack/day for 10.0 years (10.0 ttl pk-yrs)     Types: Cigarettes     Start date: 1967     Quit date: 1977     Years since quittin.4     Passive exposure: Past    Smokeless tobacco: Never    Tobacco comments:     Stopped the day i had heart attack   Vaping Use    Vaping status: Never Used   Substance Use Topics    Alcohol use: Yes     Comment: Maybe couple drinks a month    Drug use: No         ROS       Objective:   /60 (BP Location: Right arm, Patient Position: Sitting, Cuff Size: Adult)   Pulse 72   Ht 177.8 cm (70\")   Wt 77.2 kg (170 lb 3.2 oz)   SpO2 96%   BMI 24.42 kg/m²         Vitals reviewed.   Constitutional:       Appearance: Well-developed and not in distress.   Neck:      Vascular: No JVD.      Trachea: No tracheal deviation.   Pulmonary:      Effort: Pulmonary effort is normal.      Breath sounds: Normal breath sounds.   Cardiovascular:      Normal rate. Regular rhythm.      Murmurs: There is no murmur.   Pulses:     Intact distal pulses.   Edema:     Peripheral edema absent.   Abdominal:      Palpations: Abdomen is soft.      Tenderness: There is no abdominal tenderness.   Musculoskeletal:         General: No deformity. Skin:     General: Skin is warm and dry.   Neurological:      Mental Status: Alert and oriented to person, place, and time.           ECG 12 Lead    Date/Time: 2025 9:16 AM  Performed by: Shaun Joseph MD    Authorized by: Shaun Joseph MD  Comparison: compared with previous ECG from 2023  Similar to previous ECG  Rhythm: sinus rhythm  Comments: Old IMI and AS infarct. Right atrial enlargement                 Assessment:   Assessment & Plan      Diagnoses and all orders for this visit:    1. Progressive angina (Primary), new onset of symptoms in the last 4 months. Know previous  of LAD. Noninvasive imaging would not be beneficial as they will be abnormal.  Functional class III anginal equivalent.    2. " Ischemic cardiomyopathy, EF further decreasing by recent ECHO. On ARB.    3. Essential hypertension, controlled. On Doxazosin.    4. Dyslipidemia, on statin. Labs with PCP.     Other orders  -     ECG 12 Lead      Plan:  Given the patient's new onset progressive functional class III symptoms and worsening EF we will plan to proceed with LHC +/- possible intervention.   Will check lipids at time of procedure.  Will add Coreg 6.25 mg BID.   Further recommendations to follow procedure.        Kait ASTORGA scribed this dictation for Dr. Shaun Joseph.     Advance Care Planning   ACP discussion was held with the patient during this visit. Patient has an advance directive (not in EMR), copy requested.      I have seen and examined the patient, I have reviewed the note, discussed the case with the advance practice clinician, made necessary changes and I agree with the final note.    Shaun Joseph MD  05/29/25  09:28 EDT      Dictated utilizing Dragon dictation

## 2025-06-10 ENCOUNTER — HOSPITAL ENCOUNTER (INPATIENT)
Facility: HOSPITAL | Age: 75
LOS: 1 days | Discharge: HOME | End: 2025-06-11
Attending: EMERGENCY MEDICINE | Admitting: INTERNAL MEDICINE
Payer: MEDICARE

## 2025-06-10 ENCOUNTER — PREP FOR SURGERY (OUTPATIENT)
Dept: OTHER | Facility: HOSPITAL | Age: 75
End: 2025-06-10
Payer: MEDICARE

## 2025-06-10 ENCOUNTER — APPOINTMENT (OUTPATIENT)
Dept: GENERAL RADIOLOGY | Facility: HOSPITAL | Age: 75
End: 2025-06-10
Payer: MEDICARE

## 2025-06-10 DIAGNOSIS — I50.23 ACUTE ON CHRONIC SYSTOLIC HEART FAILURE: ICD-10-CM

## 2025-06-10 DIAGNOSIS — R55 SYNCOPE AND COLLAPSE: Primary | ICD-10-CM

## 2025-06-10 DIAGNOSIS — Z86.79 HISTORY OF CORONARY ARTERY DISEASE: ICD-10-CM

## 2025-06-10 DIAGNOSIS — I50.20 HFREF (HEART FAILURE WITH REDUCED EJECTION FRACTION): ICD-10-CM

## 2025-06-10 DIAGNOSIS — R79.89 ELEVATED TROPONIN: ICD-10-CM

## 2025-06-10 DIAGNOSIS — R94.31 T WAVE INVERSION IN EKG: ICD-10-CM

## 2025-06-10 PROBLEM — I25.10 CAD (CORONARY ARTERY DISEASE): Status: ACTIVE | Noted: 2025-06-10

## 2025-06-10 LAB
ALBUMIN SERPL-MCNC: 3.9 G/DL (ref 3.5–5.2)
ALBUMIN/GLOB SERPL: 1.9 G/DL
ALP SERPL-CCNC: 58 U/L (ref 39–117)
ALT SERPL W P-5'-P-CCNC: 12 U/L (ref 1–41)
ANION GAP SERPL CALCULATED.3IONS-SCNC: 10 MMOL/L (ref 5–15)
AST SERPL-CCNC: 19 U/L (ref 1–40)
BASOPHILS # BLD AUTO: 0.04 10*3/MM3 (ref 0–0.2)
BASOPHILS NFR BLD AUTO: 0.6 % (ref 0–1.5)
BILIRUB SERPL-MCNC: 0.5 MG/DL (ref 0–1.2)
BUN SERPL-MCNC: 35 MG/DL (ref 8–23)
BUN/CREAT SERPL: 23.6 (ref 7–25)
CALCIUM SPEC-SCNC: 9.3 MG/DL (ref 8.6–10.5)
CHLORIDE SERPL-SCNC: 106 MMOL/L (ref 98–107)
CHOLEST SERPL-MCNC: 144 MG/DL (ref 0–200)
CO2 SERPL-SCNC: 24 MMOL/L (ref 22–29)
CREAT SERPL-MCNC: 1.48 MG/DL (ref 0.76–1.27)
DEPRECATED RDW RBC AUTO: 42.1 FL (ref 37–54)
EGFRCR SERPLBLD CKD-EPI 2021: 48.7 ML/MIN/1.73
EOSINOPHIL # BLD AUTO: 0.22 10*3/MM3 (ref 0–0.4)
EOSINOPHIL NFR BLD AUTO: 3.3 % (ref 0.3–6.2)
ERYTHROCYTE [DISTWIDTH] IN BLOOD BY AUTOMATED COUNT: 12.7 % (ref 12.3–15.4)
GEN 5 1HR TROPONIN T REFLEX: 33 NG/L
GLOBULIN UR ELPH-MCNC: 2.1 GM/DL
GLUCOSE SERPL-MCNC: 157 MG/DL (ref 65–99)
HBA1C MFR BLD: 5.6 % (ref 4.8–5.6)
HCT VFR BLD AUTO: 41.1 % (ref 37.5–51)
HDLC SERPL-MCNC: 33 MG/DL (ref 40–60)
HGB BLD-MCNC: 13.8 G/DL (ref 13–17.7)
HOLD SPECIMEN: NORMAL
IMM GRANULOCYTES # BLD AUTO: 0.01 10*3/MM3 (ref 0–0.05)
IMM GRANULOCYTES NFR BLD AUTO: 0.2 % (ref 0–0.5)
LDLC SERPL CALC-MCNC: 76 MG/DL (ref 0–100)
LDLC/HDLC SERPL: 2.08 {RATIO}
LV EF ANGIOGRAM EST: 30 %
LYMPHOCYTES # BLD AUTO: 1.16 10*3/MM3 (ref 0.7–3.1)
LYMPHOCYTES NFR BLD AUTO: 17.6 % (ref 19.6–45.3)
MAGNESIUM SERPL-MCNC: 2.2 MG/DL (ref 1.6–2.4)
MCH RBC QN AUTO: 30.6 PG (ref 26.6–33)
MCHC RBC AUTO-ENTMCNC: 33.6 G/DL (ref 31.5–35.7)
MCV RBC AUTO: 91.1 FL (ref 79–97)
MONOCYTES # BLD AUTO: 0.56 10*3/MM3 (ref 0.1–0.9)
MONOCYTES NFR BLD AUTO: 8.5 % (ref 5–12)
NEUTROPHILS NFR BLD AUTO: 4.61 10*3/MM3 (ref 1.7–7)
NEUTROPHILS NFR BLD AUTO: 69.8 % (ref 42.7–76)
NRBC BLD AUTO-RTO: 0 /100 WBC (ref 0–0.2)
NT-PROBNP SERPL-MCNC: 396.8 PG/ML (ref 0–1800)
PLATELET # BLD AUTO: 146 10*3/MM3 (ref 140–450)
PMV BLD AUTO: 8.9 FL (ref 6–12)
POTASSIUM SERPL-SCNC: 4.4 MMOL/L (ref 3.5–5.2)
PROT SERPL-MCNC: 6 G/DL (ref 6–8.5)
RBC # BLD AUTO: 4.51 10*6/MM3 (ref 4.14–5.8)
SODIUM SERPL-SCNC: 140 MMOL/L (ref 136–145)
TRIGL SERPL-MCNC: 212 MG/DL (ref 0–150)
TROPONIN T % DELTA: -11
TROPONIN T NUMERIC DELTA: -4 NG/L
TROPONIN T SERPL HS-MCNC: 37 NG/L
TSH SERPL DL<=0.05 MIU/L-ACNC: 2.92 UIU/ML (ref 0.27–4.2)
VLDLC SERPL-MCNC: 35 MG/DL (ref 5–40)
WBC NRBC COR # BLD AUTO: 6.6 10*3/MM3 (ref 3.4–10.8)
WHOLE BLOOD HOLD COAG: NORMAL
WHOLE BLOOD HOLD SPECIMEN: NORMAL

## 2025-06-10 PROCEDURE — 85025 COMPLETE CBC W/AUTO DIFF WBC: CPT | Performed by: EMERGENCY MEDICINE

## 2025-06-10 PROCEDURE — C1874 STENT, COATED/COV W/DEL SYS: HCPCS | Performed by: INTERNAL MEDICINE

## 2025-06-10 PROCEDURE — C1725 CATH, TRANSLUMIN NON-LASER: HCPCS | Performed by: INTERNAL MEDICINE

## 2025-06-10 PROCEDURE — 84443 ASSAY THYROID STIM HORMONE: CPT

## 2025-06-10 PROCEDURE — 92928 PRQ TCAT PLMT NTRAC ST 1 LES: CPT | Performed by: INTERNAL MEDICINE

## 2025-06-10 PROCEDURE — 80061 LIPID PANEL: CPT

## 2025-06-10 PROCEDURE — 93005 ELECTROCARDIOGRAM TRACING: CPT | Performed by: EMERGENCY MEDICINE

## 2025-06-10 PROCEDURE — 36415 COLL VENOUS BLD VENIPUNCTURE: CPT

## 2025-06-10 PROCEDURE — 25810000003 SODIUM CHLORIDE 0.9 % SOLUTION: Performed by: INTERNAL MEDICINE

## 2025-06-10 PROCEDURE — C9600 PERC DRUG-EL COR STENT SING: HCPCS | Performed by: INTERNAL MEDICINE

## 2025-06-10 PROCEDURE — 93458 L HRT ARTERY/VENTRICLE ANGIO: CPT | Performed by: INTERNAL MEDICINE

## 2025-06-10 PROCEDURE — 25010000002 NICARDIPINE 2.5 MG/ML SOLUTION: Performed by: INTERNAL MEDICINE

## 2025-06-10 PROCEDURE — 83880 ASSAY OF NATRIURETIC PEPTIDE: CPT

## 2025-06-10 PROCEDURE — 25010000002 HEPARIN (PORCINE) PER 1000 UNITS: Performed by: INTERNAL MEDICINE

## 2025-06-10 PROCEDURE — 84484 ASSAY OF TROPONIN QUANT: CPT | Performed by: EMERGENCY MEDICINE

## 2025-06-10 PROCEDURE — 85347 COAGULATION TIME ACTIVATED: CPT

## 2025-06-10 PROCEDURE — 4A023N7 MEASUREMENT OF CARDIAC SAMPLING AND PRESSURE, LEFT HEART, PERCUTANEOUS APPROACH: ICD-10-PCS | Performed by: INTERNAL MEDICINE

## 2025-06-10 PROCEDURE — 83036 HEMOGLOBIN GLYCOSYLATED A1C: CPT

## 2025-06-10 PROCEDURE — 25010000002 FENTANYL CITRATE (PF) 50 MCG/ML SOLUTION: Performed by: INTERNAL MEDICINE

## 2025-06-10 PROCEDURE — 25010000002 LIDOCAINE PF 1% 1 % SOLUTION: Performed by: INTERNAL MEDICINE

## 2025-06-10 PROCEDURE — 80053 COMPREHEN METABOLIC PANEL: CPT | Performed by: EMERGENCY MEDICINE

## 2025-06-10 PROCEDURE — 83735 ASSAY OF MAGNESIUM: CPT | Performed by: EMERGENCY MEDICINE

## 2025-06-10 PROCEDURE — 99285 EMERGENCY DEPT VISIT HI MDM: CPT

## 2025-06-10 PROCEDURE — C1769 GUIDE WIRE: HCPCS | Performed by: INTERNAL MEDICINE

## 2025-06-10 PROCEDURE — C1887 CATHETER, GUIDING: HCPCS | Performed by: INTERNAL MEDICINE

## 2025-06-10 PROCEDURE — 027034Z DILATION OF CORONARY ARTERY, ONE ARTERY WITH DRUG-ELUTING INTRALUMINAL DEVICE, PERCUTANEOUS APPROACH: ICD-10-PCS | Performed by: INTERNAL MEDICINE

## 2025-06-10 PROCEDURE — 25510000001 IOPAMIDOL PER 1 ML: Performed by: INTERNAL MEDICINE

## 2025-06-10 PROCEDURE — 93799 UNLISTED CV SVC/PROCEDURE: CPT | Performed by: INTERNAL MEDICINE

## 2025-06-10 PROCEDURE — 71045 X-RAY EXAM CHEST 1 VIEW: CPT

## 2025-06-10 PROCEDURE — 93571 IV DOP VEL&/PRESS C FLO 1ST: CPT | Performed by: INTERNAL MEDICINE

## 2025-06-10 PROCEDURE — B2151ZZ FLUOROSCOPY OF LEFT HEART USING LOW OSMOLAR CONTRAST: ICD-10-PCS | Performed by: INTERNAL MEDICINE

## 2025-06-10 PROCEDURE — 25810000003 SODIUM CHLORIDE 0.9 % SOLUTION: Performed by: EMERGENCY MEDICINE

## 2025-06-10 PROCEDURE — C1894 INTRO/SHEATH, NON-LASER: HCPCS | Performed by: INTERNAL MEDICINE

## 2025-06-10 PROCEDURE — B2111ZZ FLUOROSCOPY OF MULTIPLE CORONARY ARTERIES USING LOW OSMOLAR CONTRAST: ICD-10-PCS | Performed by: INTERNAL MEDICINE

## 2025-06-10 PROCEDURE — 25010000002 MIDAZOLAM PER 1 MG: Performed by: INTERNAL MEDICINE

## 2025-06-10 DEVICE — XIENCE SKYPOINT™ EVEROLIMUS ELUTING CORONARY STENT SYSTEM 3.50 MM X 23 MM / RAPID-EXCHANGE
Type: IMPLANTABLE DEVICE | Status: FUNCTIONAL
Brand: XIENCE SKYPOINT™

## 2025-06-10 RX ORDER — AMLODIPINE BESYLATE 5 MG/1
2.5 TABLET ORAL
Status: DISCONTINUED | OUTPATIENT
Start: 2025-06-11 | End: 2025-06-10

## 2025-06-10 RX ORDER — ALPRAZOLAM 0.25 MG
0.25 TABLET ORAL 3 TIMES DAILY PRN
Status: DISCONTINUED | OUTPATIENT
Start: 2025-06-10 | End: 2025-06-11 | Stop reason: HOSPADM

## 2025-06-10 RX ORDER — ASPIRIN 81 MG/1
324 TABLET, CHEWABLE ORAL ONCE
OUTPATIENT
Start: 2025-06-10 | End: 2025-06-10

## 2025-06-10 RX ORDER — ONDANSETRON 4 MG/1
4 TABLET, ORALLY DISINTEGRATING ORAL EVERY 8 HOURS PRN
Status: DISCONTINUED | OUTPATIENT
Start: 2025-06-10 | End: 2025-06-11 | Stop reason: HOSPADM

## 2025-06-10 RX ORDER — MIDAZOLAM HYDROCHLORIDE 1 MG/ML
INJECTION, SOLUTION INTRAMUSCULAR; INTRAVENOUS
Status: DISCONTINUED | OUTPATIENT
Start: 2025-06-10 | End: 2025-06-10 | Stop reason: HOSPADM

## 2025-06-10 RX ORDER — LOSARTAN POTASSIUM 50 MG/1
100 TABLET ORAL
Status: DISCONTINUED | OUTPATIENT
Start: 2025-06-11 | End: 2025-06-10

## 2025-06-10 RX ORDER — CLOPIDOGREL BISULFATE 75 MG/1
TABLET ORAL
Status: DISCONTINUED | OUTPATIENT
Start: 2025-06-10 | End: 2025-06-10 | Stop reason: HOSPADM

## 2025-06-10 RX ORDER — ASPIRIN 81 MG/1
81 TABLET ORAL DAILY
Status: DISCONTINUED | OUTPATIENT
Start: 2025-06-11 | End: 2025-06-11 | Stop reason: HOSPADM

## 2025-06-10 RX ORDER — SODIUM CHLORIDE 9 MG/ML
100 INJECTION, SOLUTION INTRAVENOUS CONTINUOUS
OUTPATIENT
Start: 2025-06-11 | End: 2025-06-11

## 2025-06-10 RX ORDER — SODIUM CHLORIDE 0.9 % (FLUSH) 0.9 %
10 SYRINGE (ML) INJECTION AS NEEDED
Status: DISCONTINUED | OUTPATIENT
Start: 2025-06-10 | End: 2025-06-11 | Stop reason: HOSPADM

## 2025-06-10 RX ORDER — SODIUM CHLORIDE 9 MG/ML
1 INJECTION, SOLUTION INTRAVENOUS CONTINUOUS
Status: ACTIVE | OUTPATIENT
Start: 2025-06-10 | End: 2025-06-11

## 2025-06-10 RX ORDER — TERAZOSIN 1 MG/1
1 CAPSULE ORAL NIGHTLY
Status: DISCONTINUED | OUTPATIENT
Start: 2025-06-10 | End: 2025-06-11 | Stop reason: HOSPADM

## 2025-06-10 RX ORDER — CARVEDILOL 6.25 MG/1
6.25 TABLET ORAL 2 TIMES DAILY WITH MEALS
Status: DISCONTINUED | OUTPATIENT
Start: 2025-06-10 | End: 2025-06-11 | Stop reason: HOSPADM

## 2025-06-10 RX ORDER — NITROGLYCERIN 0.4 MG/1
0.4 TABLET SUBLINGUAL
Status: DISCONTINUED | OUTPATIENT
Start: 2025-06-10 | End: 2025-06-11 | Stop reason: HOSPADM

## 2025-06-10 RX ORDER — HEPARIN SODIUM 1000 [USP'U]/ML
INJECTION, SOLUTION INTRAVENOUS; SUBCUTANEOUS
Status: DISCONTINUED | OUTPATIENT
Start: 2025-06-10 | End: 2025-06-10 | Stop reason: HOSPADM

## 2025-06-10 RX ORDER — DIPHENHYDRAMINE HYDROCHLORIDE 50 MG/ML
25 INJECTION, SOLUTION INTRAMUSCULAR; INTRAVENOUS EVERY 6 HOURS PRN
Status: DISCONTINUED | OUTPATIENT
Start: 2025-06-10 | End: 2025-06-11 | Stop reason: HOSPADM

## 2025-06-10 RX ORDER — CLOPIDOGREL BISULFATE 75 MG/1
75 TABLET ORAL DAILY
Status: DISCONTINUED | OUTPATIENT
Start: 2025-06-11 | End: 2025-06-11 | Stop reason: HOSPADM

## 2025-06-10 RX ORDER — ACETAMINOPHEN 325 MG/1
650 TABLET ORAL EVERY 4 HOURS PRN
Status: DISCONTINUED | OUTPATIENT
Start: 2025-06-10 | End: 2025-06-11 | Stop reason: HOSPADM

## 2025-06-10 RX ORDER — LEVOTHYROXINE SODIUM 25 UG/1
75 TABLET ORAL
Status: DISCONTINUED | OUTPATIENT
Start: 2025-06-11 | End: 2025-06-11 | Stop reason: HOSPADM

## 2025-06-10 RX ORDER — ASPIRIN 81 MG/1
81 TABLET ORAL DAILY
OUTPATIENT
Start: 2025-06-11

## 2025-06-10 RX ORDER — SODIUM CHLORIDE 0.9 % (FLUSH) 0.9 %
10 SYRINGE (ML) INJECTION EVERY 12 HOURS SCHEDULED
Status: DISCONTINUED | OUTPATIENT
Start: 2025-06-10 | End: 2025-06-11 | Stop reason: HOSPADM

## 2025-06-10 RX ORDER — TRAZODONE HYDROCHLORIDE 50 MG/1
50 TABLET ORAL NIGHTLY
Status: DISCONTINUED | OUTPATIENT
Start: 2025-06-10 | End: 2025-06-10

## 2025-06-10 RX ORDER — TRAZODONE HYDROCHLORIDE 50 MG/1
50 TABLET ORAL NIGHTLY PRN
Status: DISCONTINUED | OUTPATIENT
Start: 2025-06-10 | End: 2025-06-11 | Stop reason: HOSPADM

## 2025-06-10 RX ORDER — IOPAMIDOL 755 MG/ML
INJECTION, SOLUTION INTRAVASCULAR
Status: DISCONTINUED | OUTPATIENT
Start: 2025-06-10 | End: 2025-06-10 | Stop reason: HOSPADM

## 2025-06-10 RX ORDER — ATORVASTATIN CALCIUM 40 MG/1
40 TABLET, FILM COATED ORAL NIGHTLY
Status: DISCONTINUED | OUTPATIENT
Start: 2025-06-10 | End: 2025-06-11 | Stop reason: HOSPADM

## 2025-06-10 RX ORDER — LIDOCAINE HYDROCHLORIDE 10 MG/ML
INJECTION, SOLUTION EPIDURAL; INFILTRATION; INTRACAUDAL; PERINEURAL
Status: DISCONTINUED | OUTPATIENT
Start: 2025-06-10 | End: 2025-06-10 | Stop reason: HOSPADM

## 2025-06-10 RX ORDER — ACETAMINOPHEN 325 MG/1
650 TABLET ORAL EVERY 8 HOURS PRN
Status: DISCONTINUED | OUTPATIENT
Start: 2025-06-10 | End: 2025-06-10 | Stop reason: SDUPTHER

## 2025-06-10 RX ORDER — FENTANYL CITRATE 50 UG/ML
INJECTION, SOLUTION INTRAMUSCULAR; INTRAVENOUS
Status: DISCONTINUED | OUTPATIENT
Start: 2025-06-10 | End: 2025-06-10 | Stop reason: HOSPADM

## 2025-06-10 RX ADMIN — Medication 10 ML: at 20:33

## 2025-06-10 RX ADMIN — SODIUM CHLORIDE 1000 ML: 9 INJECTION, SOLUTION INTRAVENOUS at 11:43

## 2025-06-10 RX ADMIN — TERAZOSIN HYDROCHLORIDE 1 MG: 1 CAPSULE ORAL at 21:00

## 2025-06-10 RX ADMIN — SACUBITRIL AND VALSARTAN 1 TABLET: 24; 26 TABLET, FILM COATED ORAL at 21:00

## 2025-06-10 RX ADMIN — SODIUM CHLORIDE 1 ML/KG/HR: 9 INJECTION, SOLUTION INTRAVENOUS at 20:39

## 2025-06-10 RX ADMIN — CARVEDILOL 6.25 MG: 6.25 TABLET, FILM COATED ORAL at 17:54

## 2025-06-10 RX ADMIN — ATORVASTATIN CALCIUM 40 MG: 40 TABLET, FILM COATED ORAL at 21:00

## 2025-06-10 NOTE — ED NOTES
Gee Whitney    Nursing Report ED to Floor:  Mental status: GCS 15  Ambulatory status: IND- STANDBY   Oxygen Therapy:  RA  Cardiac Rhythm: NSR  Admitted from: HOME  Safety Concerns:  N/A  Precautions: STANDARD   Social Issues: LEFT HEART CATH  CASE REQUEST   ED Room #:  19    ED Nurse Phone Extension - [phone] or may call [phone].      HPI:   Chief Complaint   Patient presents with    Syncope       Past Medical History:  Past Medical History:   Diagnosis Date    Chronic kidney disease     Congenital heart disease 1977    Coronary artery disease 1977    Disease of thyroid gland     GERD (gastroesophageal reflux disease)     Have occually but not in regulat basis    Heart failure 05/09/2025    Hyperlipidemia     Hypertension     Myocardial infarction         Past Surgical History:  Past Surgical History:   Procedure Laterality Date    BRONCHOSCOPY      CARDIAC CATHETERIZATION  1977    HERNIA REPAIR      TENDON RELEASE      Left thumb         Admitting Doctor:   Shaun Joseph MD    Consulting Provider(s):  Consults       Date and Time Order Name Status Description    6/10/2025 12:16 PM Inpatient Cardiology Consult               Admitting Diagnosis:   The primary encounter diagnosis was Syncope and collapse. Diagnoses of History of coronary artery disease, Elevated troponin, Acute on chronic systolic heart failure, and T wave inversion in EKG were also pertinent to this visit.    Most Recent Vitals:   Vitals:    06/10/25 1130 06/10/25 1200 06/10/25 1230 06/10/25 1300   BP:       Patient Position:       Pulse: 56 53 64 59   Resp:       Temp:       TempSrc:       SpO2: 96% 95% 99% 97%   Weight:       Height:           Active LDAs/IV Access:   Lines, Drains & Airways       Active LDAs       Name Placement date Placement time Site Days    Peripheral IV 06/10/25 1019 18 G Left Antecubital 06/10/25  1019  Antecubital  less than 1    Peripheral IV 06/10/25 1022 20 G Anterior;Right Forearm 06/10/25  1022  Forearm  less than  1                    Labs (abnormal labs have a star):   Labs Reviewed   COMPREHENSIVE METABOLIC PANEL - Abnormal; Notable for the following components:       Result Value    Glucose 157 (*)     BUN 35.0 (*)     Creatinine 1.48 (*)     eGFR 48.7 (*)     All other components within normal limits    Narrative:     GFR Categories in Chronic Kidney Disease (CKD)              GFR Category          GFR (mL/min/1.73)    Interpretation  G1                    90 or greater        Normal or high (1)  G2                    60-89                Mild decrease (1)  G3a                   45-59                Mild to moderate decrease  G3b                   30-44                Moderate to severe decrease  G4                    15-29                Severe decrease  G5                    14 or less           Kidney failure    (1)In the absence of evidence of kidney disease, neither GFR category G1 or G2 fulfill the criteria for CKD.    eGFR calculation 2021 CKD-EPI creatinine equation, which does not include race as a factor   TROPONIN - Abnormal; Notable for the following components:    HS Troponin T 37 (*)     All other components within normal limits    Narrative:     High Sensitive Troponin T Reference Range:  <14.0 ng/L- Negative Female for AMI  <22.0 ng/L- Negative Male for AMI  >=14 - Abnormal Female indicating possible myocardial injury.  >=22 - Abnormal Male indicating possible myocardial injury.   Clinicians would have to utilize clinical acumen, EKG, Troponin, and serial changes to determine if it is an Acute Myocardial Infarction or myocardial injury due to an underlying chronic condition.        CBC WITH AUTO DIFFERENTIAL - Abnormal; Notable for the following components:    Lymphocyte % 17.6 (*)     All other components within normal limits   HIGH SENSITIVITIY TROPONIN T 1HR - Abnormal; Notable for the following components:    HS Troponin T 33 (*)     All other components within normal limits    Narrative:     High  Sensitive Troponin T Reference Range:  <14.0 ng/L- Negative Female for AMI  <22.0 ng/L- Negative Male for AMI  >=14 - Abnormal Female indicating possible myocardial injury.  >=22 - Abnormal Male indicating possible myocardial injury.   Clinicians would have to utilize clinical acumen, EKG, Troponin, and serial changes to determine if it is an Acute Myocardial Infarction or myocardial injury due to an underlying chronic condition.        LIPID PANEL - Abnormal; Notable for the following components:    Triglycerides 212 (*)     HDL Cholesterol 33 (*)     All other components within normal limits    Narrative:     Cholesterol Reference Ranges  (U.S. Department of Health and Human Services ATP III Classifications)    Desirable          <200 mg/dL  Borderline High    200-239 mg/dL  High Risk          >240 mg/dL      Triglyceride Reference Ranges  (U.S. Department of Health and Human Services ATP III Classifications)    Normal           <150 mg/dL  Borderline High  150-199 mg/dL  High             200-499 mg/dL  Very High        >500 mg/dL    HDL Reference Ranges  (U.S. Department of Health and Human Services ATP III Classifications)    Low     <40 mg/dl (major risk factor for CHD)  High    >60 mg/dl ('negative' risk factor for CHD)        LDL Reference Ranges  (U.S. Department of Health and Human Services ATP III Classifications)    Optimal          <100 mg/dL  Near Optimal     100-129 mg/dL  Borderline High  130-159 mg/dL  High             160-189 mg/dL  Very High        >189 mg/dL    LDL is calculated using the NIH LDL-C calculation.     MAGNESIUM - Normal   HEMOGLOBIN A1C - Normal    Narrative:     Hemoglobin A1C Ranges:    Increased Risk for Diabetes  5.7% to 6.4%  Diabetes                     >= 6.5%  Diabetic Goal                < 7.0%   RAINBOW DRAW    Narrative:     The following orders were created for panel order [place] Draw.  Procedure                               Abnormality         Status                      ---------                               -----------         ------                     Green Top (Gel)[[id number]]                                  Final result               Lavender Top[[id number]]                                     Final result               Gold Top - SST[[id number]]                                   Final result               Santos Top[[id number]]                                         Final result               Light Blue Top[[id number]]                                   Final result                 Please view results for these tests on the individual orders.   PROBNP (REFERENCE)   TSH RFX ON ABNORMAL TO FREE T4   CBC AND DIFFERENTIAL    Narrative:     The following orders were created for panel order CBC & Differential.  Procedure                               Abnormality         Status                     ---------                               -----------         ------                     CBC Auto Differential[[id number]]        Abnormal            Final result                 Please view results for these tests on the individual orders.   GREEN TOP   LAVENDER TOP   GOLD TOP - SST   GRAY TOP   LIGHT BLUE TOP       Meds Given in ED:   Medications   sodium chloride 0.9 % flush 10 mL (has no administration in time range)   sodium chloride 0.9 % bolus 1,000 mL (0 mL Intravenous Stopped 6/10/25 1404)           Last NIH score:                                                          Dysphagia screening results:        Maria Isabel Coma Scale:  No data recorded     CIWA:        Restraint Type:            Isolation Status:  No active isolations

## 2025-06-10 NOTE — ED PROVIDER NOTES
Subjective   History of Present Illness  Mr. Whitney arrives by EMS after having a syncopal spell.  He was walking out of [employer] this morning when he began feeling weak and dizzy.  He asked his wife to drive.  While she was driving he slumped over for about 60 seconds.  He denies any chest pain or shortness of breath.  Immediately after had urge to have a bowel movement.  He went in [employer] and had a bowel movement.  Tells me he just feels concerned.  Was scheduled to have a heart cath today but was not approved by insurance and so it was canceled.  Tells me he felt fine when he first walked into [employer].  He has history of ischemic cardiomyopathy, recent echo shows EF 31 to 35%.  Has prior coronary artery disease.  Sees Dr. Joseph.      Review of Systems    Past Medical History:   Diagnosis Date    Chronic kidney disease     Congenital heart disease     Coronary artery disease     Disease of thyroid gland     GERD (gastroesophageal reflux disease)     Have occually but not in regulat basis    Heart failure 2025    Hyperlipidemia     Hypertension     Myocardial infarction        No Known Allergies    Past Surgical History:   Procedure Laterality Date    BRONCHOSCOPY      CARDIAC CATHETERIZATION      HERNIA REPAIR      TENDON RELEASE      Left thumb        Family History   Problem Relation Age of Onset    Heart failure Mother     Heart attack Mother         Had triple bypass     Heart disease Mother         Had triple bypass     Other Father 28         in car accident    Heart attack Sister     Mitral valve prolapse Sister     Asthma Brother     Stroke Brother     Hypertension Child        Social History     Socioeconomic History    Marital status:     Number of children: 2   Tobacco Use    Smoking status: Former     Current packs/day: 0.00     Average packs/day: 1 pack/day for 10.0 years (10.0 ttl pk-yrs)     Types: Cigarettes     Start date: 1967     Quit date:  1977     Years since quittin.4     Passive exposure: Past    Smokeless tobacco: Never    Tobacco comments:     Stopped the day i had heart attack   Vaping Use    Vaping status: Never Used   Substance and Sexual Activity    Alcohol use: Yes     Comment: Maybe couple drinks a month    Drug use: No    Sexual activity: Not Currently     Partners: Female     Birth control/protection: None           Objective   Physical Exam  Vitals and nursing note reviewed.   Constitutional:       General: He is not in acute distress.     Appearance: Normal appearance.   HENT:      Head: Normocephalic and atraumatic.      Nose: Nose normal. No congestion or rhinorrhea.   Eyes:      General: No scleral icterus.     Conjunctiva/sclera: Conjunctivae normal.   Neck:      Comments: No JVD   Cardiovascular:      Rate and Rhythm: Normal rate and regular rhythm.      Heart sounds: No murmur heard.     No friction rub.   Pulmonary:      Effort: Pulmonary effort is normal.      Breath sounds: Normal breath sounds. No wheezing or rales.   Abdominal:      General: Bowel sounds are normal.      Palpations: Abdomen is soft.      Tenderness: There is no abdominal tenderness. There is no guarding or rebound.   Musculoskeletal:         General: No tenderness.      Cervical back: Normal range of motion and neck supple.      Right lower leg: No edema.      Left lower leg: No edema.   Skin:     General: Skin is warm and dry.      Coloration: Skin is not pale.      Findings: No erythema.   Neurological:      General: No focal deficit present.      Mental Status: He is alert and oriented to person, place, and time.      Motor: No weakness.      Coordination: Coordination normal.   Psychiatric:         Mood and Affect: Mood normal.         Behavior: Behavior normal.         Thought Content: Thought content normal.         Procedures           ED Course  ED Course as of 25 0655   Tue Damon 10, 2025   1021 Obtained EKG.  He has loss of R waves  "anteriorly.  Nonspecific appearance of the ST segments in the inferior and anterior leads.  This is unchanged from most recent EKG which was 5/29/2025.  T wave inversion in lateral leads much more pronounced than on most recent EKG as well. [DT]   1138 Kidney function has dipped a little bit.  Will give IV fluids over a couple of hours.  On reexam he is comfortable and tells me he feels \"fine\". Paged Dr Joseph. [DT]   1215 Discussed with Dr. Joseph, he asks that I call his office and request someone who is available to come see Mr. Whitney.  Will plan heart cath later this afternoon. [DT]   1351 Cardiology has seen him and is planning on admitting him to the hospital overnight.  Planning on performing heart cath as soon as able. [DT]      ED Course User Index  [DT] Db Khan MD                                                       Medical Decision Making  Reviewed and interpreted prior records.  Ordered and interpreted multiple troponin levels and labs, chest x-ray, EKG.  Consulted cardiology    Problems Addressed:  Elevated troponin: complicated acute illness or injury that poses a threat to life or bodily functions  History of coronary artery disease: chronic illness or injury with exacerbation, progression, or side effects of treatment  Syncope and collapse: complicated acute illness or injury that poses a threat to life or bodily functions  T wave inversion in EKG: complicated acute illness or injury that poses a threat to life or bodily functions    Amount and/or Complexity of Data Reviewed  External Data Reviewed: ECG and notes.  Labs: ordered.  ECG/medicine tests: ordered. Decision-making details documented in ED Course.    Risk  Prescription drug management.  Decision regarding hospitalization.        Final diagnoses:   Syncope and collapse   History of coronary artery disease   Elevated troponin   T wave inversion in EKG       ED Disposition  ED Disposition       ED Disposition   Decision to Admit    " Condition   --    Comment   Level of Care: Observation Unit [28]   Diagnosis: HFrEF (heart failure with reduced ejection fraction) [[id number]]   Admitting Physician: TERRI BARRERA [[id number]]   Attending Physician: TERRI BARRERA [[id number]]   Isolate for COVID?: No [0]   Certification: Inpatient Only Procedure                 No follow-up provider specified.       Medication List      No changes were made to your prescriptions during this visit.            Db Khan MD  06/11/25 0656

## 2025-06-10 NOTE — H&P
"[facility] HISTORY AND PHYSICAL    Gee Whitney  : 1950  MRN:[id number]  IDENTIFICATION: A 74 y.o. male     Date of Admission:6/10/2025      CC:   Chief Complaint   Patient presents with    Syncope       HPI: Donte Whitney is a 74-year-old male with a past medical history listed below who presents to [facility] ED secondary to syncopal episode.  Patient had just finished eating a large meal out with his wife and began feeling SOB, weak and dizzy while walking so he asked his wife to drive home.  While in the car his wife looked over and noted he was unconscious and slumped over.  Patient was unconscious for \" at least 1 to 2 minutes\" before coming to and reporting the need to have a bowel movement.  Patient and wife were concerned as he recently saw Dr. Joseph in the office on  and was planning to have a LHC for worsening EF and progression of class    III symptoms , but was unfortunately declined by insurance.  Patient endorses feelings of ROWLEY AND dizziness/lightheadedness for several weeks now.     Upon arrival to the ED ischemic workup showed elevated and flat troponin's, EKG with sinus bradycardia and occasional PVCs with new ST depression in lead I, aVL.       Problem List:   Coronary artery disease:  LHC for MI, , Dr. Carlos Gilmore: Occluded LAD with reported collaterals per patient report, normal LVEF.  MPS, 2014: EF 50%, anterior apical scar. No ischemia.  US aorta, 2016: No abdominal aortic aneurysm.  Exercise MPS, 2019: prior infarction of the anterior apex, and anterior septum with no significant ischemia noted. EF 40%. Severe hypokinesis.  2023 MPS with infarct of anterior and significant ischemia.  EF 32%.  Severe hypokinesis of anterior and septal wall.  2025 ECHO EF 31-35%. No significant valve disease. With significant WMA of the anterior, septal and apical walls.   Hypertension.   Dyslipidemia.  COPD  COVID-19 " 1/2022  Did not require hospitalization  History of elevated LFTs.   Chronic Bronchitis  Follows with pulmonology [facility]   Traumatic hemothorax 2018  Secondary to chronic bronchitis with coughing fits  Renal cyst.  Hypothyroidism.  Bilateral cataract surgery.    Remote tobacco abuse.    ROS:   @Cardiovascular ROS: positive for - dyspnea on exertion and loss of consciousness@    Home Medications:   Prior to Admission Medications       Prescriptions Last Dose Informant Patient Reported? Taking?    albuterol sulfate  (90 Base) MCG/ACT inhaler   No Yes    Inhale 2 puffs Every 4 (Four) Hours As Needed for Wheezing.    amLODIPine (NORVASC) 2.5 MG tablet   Yes Yes    Take 2 tablets by mouth Daily.    aspirin  MG tablet   Yes Yes    Take 1 tablet by mouth Daily.    Patient taking differently:  Take 1 tablet by mouth Every Other Day.    atorvastatin (LIPITOR) 40 MG tablet   Yes Yes    Take 1 tablet by mouth Every Night.    Patient taking differently:  Take 1 tablet by mouth Every Other Day.    furosemide (LASIX) 20 MG tablet   Yes Yes    Take 1 tablet by mouth As Needed.    traZODone (DESYREL) 50 MG tablet   Yes Yes    Take 1 tablet by mouth Every Night.    carvedilol (COREG) 6.25 MG tablet   No No    Take 1 tablet by mouth 2 (Two) Times a Day.    doxazosin (CARDURA) 1 MG tablet   Yes No    Take 1 tablet by mouth Every Night.    Patient taking differently:  Take 4 tablets by mouth Every Night.    ipratropium (ATROVENT) 0.06 % nasal spray   Yes No    Administer 2 sprays into the nostril(s) as directed by provider 3 (Three) Times a Day.    levothyroxine (SYNTHROID, LEVOTHROID) 75 MCG tablet   Yes No    Take 1 tablet by mouth Daily.    losartan (COZAAR) 100 MG tablet   Yes No    Take 1 tablet by mouth Daily.    Multiple Vitamins-Minerals (ICAPS AREDS 2 PO)   Yes No    Take 2 tablets by mouth Daily.    nitroglycerin (NITROSTAT) 0.4 MG SL tablet   Yes No    Place 1 tablet under the tongue Every 5 (Five) Minutes As  "Needed.    Omega-3 Fatty Acids (FISH OIL) 1200 MG capsule delayed-release   Yes No    Take 1 capsule by mouth Daily.    Polyethylene Glycol 3350 (MIRALAX PO)   Yes No    Take  by mouth Every Other Day. Mixes with orange juice    tiotropium bromide-olodaterol (Stiolto Respimat) 2.5-2.5 MCG/ACT aerosol solution inhaler   No No    Inhale 2 puffs Daily As Needed (cough).    vitamin D (ERGOCALCIFEROL) 1.25 MG (08960 UT) capsule capsule   Yes No    Take 1 capsule by mouth 1 (One) Time Per Week.            Surgical History:   Past Surgical History:   Procedure Laterality Date    BRONCHOSCOPY      CARDIAC CATHETERIZATION      HERNIA REPAIR      TENDON RELEASE      Left thumb        Allergies: No Known Allergies    Social History:   Social History     Socioeconomic History    Marital status:     Number of children: 2   Tobacco Use    Smoking status: Former     Current packs/day: 0.00     Average packs/day: 1 pack/day for 10.0 years (10.0 ttl pk-yrs)     Types: Cigarettes     Start date: 1967     Quit date: 1977     Years since quittin.4     Passive exposure: Past    Smokeless tobacco: Never    Tobacco comments:     Stopped the day i had heart attack   Vaping Use    Vaping status: Never Used   Substance and Sexual Activity    Alcohol use: Yes     Comment: Maybe couple drinks a month    Drug use: No    Sexual activity: Not Currently     Partners: Female     Birth control/protection: None       Family History:   Family History   Problem Relation Age of Onset    Heart failure Mother     Heart attack Mother         Had triple bypass     Heart disease Mother         Had triple bypass     Other Father 28         in car accident    Heart attack Sister     Mitral valve prolapse Sister     Asthma Brother     Stroke Brother     Hypertension Child        Objective     /75 (Patient Position: Standing)   Pulse 59   Temp 98.2 °F (36.8 °C) (Oral)   Resp 18   Ht 177.8 cm (70\")   Wt 77.1 kg (170 " lb)   SpO2 97%   BMI 24.39 kg/m²     Intake/Output Summary (Last 24 hours) at 6/10/2025 1340  Last data filed at 6/10/2025 1018  Gross per 24 hour   Intake 250 ml   Output --   Net 250 ml       Physical Exam  Vitals reviewed.   Constitutional:       Appearance: Normal appearance.   HENT:      Head: Normocephalic.      Mouth/Throat:      Mouth: Mucous membranes are moist.      Pharynx: Oropharynx is clear.   Eyes:      Pupils: Pupils are equal, round, and reactive to light.   Cardiovascular:      Rate and Rhythm: Regular rhythm. Bradycardia present.      Pulses: Normal pulses.      Heart sounds: Normal heart sounds.   Pulmonary:      Effort: Pulmonary effort is normal.      Breath sounds: Normal breath sounds.   Musculoskeletal:         General: No swelling.      Cervical back: Normal range of motion.   Skin:     General: Skin is warm and dry.      Capillary Refill: Capillary refill takes 2 to 3 seconds.   Neurological:      General: No focal deficit present.      Mental Status: He is alert. Mental status is at baseline.   Psychiatric:         Mood and Affect: Mood normal.         Behavior: Behavior is cooperative.           Labs:  Results from last 7 days   Lab Units 06/10/25  1022   SODIUM mmol/L 140   POTASSIUM mmol/L 4.4   CHLORIDE mmol/L 106   CO2 mmol/L 24.0   BUN mg/dL 35.0*   CREATININE mg/dL 1.48*   GLUCOSE mg/dL 157*   CALCIUM mg/dL 9.3     Results from last 7 days   Lab Units 06/10/25  1140 06/10/25  1022   HSTROP T ng/L 33* 37*     Results from last 7 days   Lab Units 06/10/25  1022   WBC 10*3/mm3 6.60   HEMOGLOBIN g/dL 13.8   HEMATOCRIT % 41.1   PLATELETS 10*3/mm3 146     Results from last 7 days   Lab Units 06/10/25  1022   MAGNESIUM mg/dL 2.2     Results from last 7 days   Lab Units 06/10/25  1140   CHOLESTEROL mg/dL 144   TRIGLYCERIDES mg/dL 212*   HDL CHOL mg/dL 33*         Results from last 7 days   Lab Units 06/10/25  1022   HEMOGLOBIN A1C % 5.60             Imaging/Diagnostics:     EKG:   Test  Reason : Syncope  Blood Pressure :   */*   mmHG  Vent. Rate :  56 BPM     Atrial Rate :  56 BPM     P-R Int : 164 ms          QRS Dur : 102 ms      QT Int : 458 ms       P-R-T Axes :  66  35 112 degrees    QTcB Int : 441 ms     Sinus bradycardia with occasional premature ventricular complexes and  fusion complexes  Possible Left atrial enlargement  Inferior infarct (cited on or before 07-Mar-2018)  Anteroseptal infarct (cited on or before 07-Mar-2018)  T wave abnormality, consider lateral ischemia  Abnormal ECG  When compared with ECG of 14-Feb-2023 00:27,  fusion complexes are now present  premature ventricular complexes are now present          Results for orders placed during the hospital encounter of 05/09/25    Adult Transthoracic Echo Complete W/ Cont if Necessary Per Protocol    Interpretation Summary    Left ventricular systolic function is moderately decreased.Left ventricular ejection fraction appears to be 31 - 35%.    The following left ventricular wall segments are dyskinetic: mid anteroseptal. The following left ventricular wall segments are akinetic: mid anterior, apical anterior, apical septal and apex. The following left ventricular wall segments are aneurysmal: mid anteroseptal.    No hemodynamically significant valvular heart      Assessment and Plan:     ASSESSMENT:   HFrEF  EF 31-35%, decreased from 32% in 2023   GDMT: Coreg, Losartan   Lasix 20 mg daily  Chest x-ray with no cardiopulmonary process, no CHF presentation.   CAD  ASA, statin  Troponin's 37 --> 33  EKG with new ST depression in lateral leads  Acute syncopal episode with SOB, negative for CP  HTN, goal < 130/80  Controlled on amlodipine 2.5 mg, doxazosin 1 mg, daily   Dyslipidemia  LDL well-controlled at 76  Triglycerides elevated at 212, hemoglobin A1c normal at 5.6, although seems to have chronically elevated glucoses  DEB on CKD   Creatinine 1.48 on presentation, baseline appears to be ~1-1.2.  Follows with nephrology   Providing  pre-procedure hydration   Hypothyroidism  Pending thyroid labs  Synthroid 75 mcg    PLAN:  LHC +/- CBI via right radial artery. Patient has been NPO since 08:30 today. The risks, benefits, and alternatives of the procedure have been reviewed and the patient wishes to proceed.   Will admit for overnight observation given presentation to ED of syncopal event. Continue to monitor HR and rhythm.   CBC and BMP check in the a.m.   Remainder of recommendations to come post procedure.       GAURI Durand

## 2025-06-10 NOTE — Clinical Note
First balloon inflation max pressure = 15 becca. First balloon inflation duration = 12 seconds. Second inflation of balloon - Max pressure = 15 bceca. 2nd Inflation of balloon - Duration = 12 seconds.

## 2025-06-10 NOTE — Clinical Note
Level of Care: Observation Unit [28]   Diagnosis: CAD (coronary artery disease) [600315]   Admitting Physician: TERRI BARRERA [[id number]]   Attending Physician: TERRI BARRERA [[id number]]   Isolate for COVID?: No [0]   Certification: I Certify That Inpatient Hospital Services Are Medically Necessary For Greater Than 2 Midnights

## 2025-06-10 NOTE — Clinical Note
First balloon inflation max pressure = 20 becca. First balloon inflation duration = 15 seconds. Second inflation of balloon - Max pressure = 20 becca. 2nd Inflation of balloon - Duration = 15 seconds.

## 2025-06-11 ENCOUNTER — TRANSCRIBE ORDERS (OUTPATIENT)
Dept: CARDIAC REHAB | Facility: HOSPITAL | Age: 75
End: 2025-06-11
Payer: MEDICARE

## 2025-06-11 ENCOUNTER — READMISSION MANAGEMENT (OUTPATIENT)
Dept: CALL CENTER | Facility: HOSPITAL | Age: 75
End: 2025-06-11
Payer: MEDICARE

## 2025-06-11 VITALS
OXYGEN SATURATION: 91 % | SYSTOLIC BLOOD PRESSURE: 145 MMHG | RESPIRATION RATE: 16 BRPM | HEART RATE: 60 BPM | DIASTOLIC BLOOD PRESSURE: 70 MMHG | HEIGHT: 70 IN | TEMPERATURE: 99.1 F | BODY MASS INDEX: 24.34 KG/M2 | WEIGHT: 170 LBS

## 2025-06-11 DIAGNOSIS — Z95.5 STENTED CORONARY ARTERY: Primary | ICD-10-CM

## 2025-06-11 LAB
ANION GAP SERPL CALCULATED.3IONS-SCNC: 8 MMOL/L (ref 5–15)
BUN SERPL-MCNC: 21.2 MG/DL (ref 8–23)
BUN/CREAT SERPL: 18.9 (ref 7–25)
CALCIUM SPEC-SCNC: 8.1 MG/DL (ref 8.6–10.5)
CHLORIDE SERPL-SCNC: 107 MMOL/L (ref 98–107)
CHOLEST SERPL-MCNC: 133 MG/DL (ref 0–200)
CO2 SERPL-SCNC: 23 MMOL/L (ref 22–29)
CREAT SERPL-MCNC: 1.12 MG/DL (ref 0.76–1.27)
DEPRECATED RDW RBC AUTO: 41.7 FL (ref 37–54)
EGFRCR SERPLBLD CKD-EPI 2021: 68.1 ML/MIN/1.73
ERYTHROCYTE [DISTWIDTH] IN BLOOD BY AUTOMATED COUNT: 12.8 % (ref 12.3–15.4)
GLUCOSE SERPL-MCNC: 101 MG/DL (ref 65–99)
HCT VFR BLD AUTO: 38.9 % (ref 37.5–51)
HDLC SERPL-MCNC: 36 MG/DL (ref 40–60)
HGB BLD-MCNC: 12.9 G/DL (ref 13–17.7)
LDLC SERPL CALC-MCNC: 78 MG/DL (ref 0–100)
LDLC/HDLC SERPL: 2.15 {RATIO}
MAGNESIUM SERPL-MCNC: 1.8 MG/DL (ref 1.6–2.4)
MCH RBC QN AUTO: 30.2 PG (ref 26.6–33)
MCHC RBC AUTO-ENTMCNC: 33.2 G/DL (ref 31.5–35.7)
MCV RBC AUTO: 91.1 FL (ref 79–97)
PLATELET # BLD AUTO: 150 10*3/MM3 (ref 140–450)
PMV BLD AUTO: 9.3 FL (ref 6–12)
POTASSIUM SERPL-SCNC: 4 MMOL/L (ref 3.5–5.2)
QT INTERVAL: 458 MS
QT INTERVAL: 474 MS
QTC INTERVAL: 441 MS
QTC INTERVAL: 461 MS
RBC # BLD AUTO: 4.27 10*6/MM3 (ref 4.14–5.8)
SODIUM SERPL-SCNC: 138 MMOL/L (ref 136–145)
TRIGL SERPL-MCNC: 98 MG/DL (ref 0–150)
VLDLC SERPL-MCNC: 19 MG/DL (ref 5–40)
WBC NRBC COR # BLD AUTO: 9.14 10*3/MM3 (ref 3.4–10.8)

## 2025-06-11 PROCEDURE — 99239 HOSP IP/OBS DSCHRG MGMT >30: CPT

## 2025-06-11 PROCEDURE — 80061 LIPID PANEL: CPT | Performed by: INTERNAL MEDICINE

## 2025-06-11 PROCEDURE — 93005 ELECTROCARDIOGRAM TRACING: CPT | Performed by: INTERNAL MEDICINE

## 2025-06-11 PROCEDURE — 93010 ELECTROCARDIOGRAM REPORT: CPT | Performed by: INTERNAL MEDICINE

## 2025-06-11 PROCEDURE — 83735 ASSAY OF MAGNESIUM: CPT | Performed by: INTERNAL MEDICINE

## 2025-06-11 PROCEDURE — 80048 BASIC METABOLIC PNL TOTAL CA: CPT | Performed by: INTERNAL MEDICINE

## 2025-06-11 PROCEDURE — 85027 COMPLETE CBC AUTOMATED: CPT | Performed by: INTERNAL MEDICINE

## 2025-06-11 PROCEDURE — 25010000002 MAGNESIUM SULFATE 2 GM/50ML SOLUTION

## 2025-06-11 RX ORDER — CLOPIDOGREL BISULFATE 75 MG/1
75 TABLET ORAL DAILY
Qty: 90 TABLET | Refills: 1 | Status: SHIPPED | OUTPATIENT
Start: 2025-06-12

## 2025-06-11 RX ORDER — SPIRONOLACTONE 25 MG/1
25 TABLET ORAL DAILY
Status: DISCONTINUED | OUTPATIENT
Start: 2025-06-11 | End: 2025-06-11 | Stop reason: HOSPADM

## 2025-06-11 RX ORDER — MAGNESIUM SULFATE HEPTAHYDRATE 40 MG/ML
2 INJECTION, SOLUTION INTRAVENOUS ONCE
Status: COMPLETED | OUTPATIENT
Start: 2025-06-11 | End: 2025-06-11

## 2025-06-11 RX ORDER — TERAZOSIN 1 MG/1
1 CAPSULE ORAL NIGHTLY
Qty: 90 CAPSULE | Refills: 3 | Status: SHIPPED | OUTPATIENT
Start: 2025-06-11

## 2025-06-11 RX ORDER — ASPIRIN 81 MG/1
81 TABLET ORAL DAILY
Qty: 90 TABLET | Refills: 3 | Status: SHIPPED | OUTPATIENT
Start: 2025-06-12

## 2025-06-11 RX ORDER — SPIRONOLACTONE 25 MG/1
25 TABLET ORAL DAILY
Qty: 90 TABLET | Refills: 3 | Status: SHIPPED | OUTPATIENT
Start: 2025-06-11

## 2025-06-11 RX ADMIN — MAGNESIUM SULFATE HEPTAHYDRATE 2 G: 40 INJECTION, SOLUTION INTRAVENOUS at 09:46

## 2025-06-11 RX ADMIN — SACUBITRIL AND VALSARTAN 1 TABLET: 24; 26 TABLET, FILM COATED ORAL at 08:01

## 2025-06-11 RX ADMIN — ASPIRIN 81 MG: 81 TABLET, COATED ORAL at 08:01

## 2025-06-11 RX ADMIN — SPIRONOLACTONE 25 MG: 25 TABLET ORAL at 09:46

## 2025-06-11 RX ADMIN — CARVEDILOL 6.25 MG: 6.25 TABLET, FILM COATED ORAL at 07:57

## 2025-06-11 RX ADMIN — CLOPIDOGREL BISULFATE 75 MG: 75 TABLET, FILM COATED ORAL at 08:00

## 2025-06-11 RX ADMIN — LEVOTHYROXINE SODIUM 75 MCG: 0.03 TABLET ORAL at 08:42

## 2025-06-11 RX ADMIN — Medication 10 ML: at 08:05

## 2025-06-11 NOTE — PLAN OF CARE
Problem: Adult Inpatient Plan of Care  Goal: Plan of Care Review  Outcome: Progressing  Goal: Patient-Specific Goal (Individualized)  Outcome: Progressing  Goal: Absence of Hospital-Acquired Illness or Injury  Outcome: Progressing  Intervention: Identify and Manage Fall Risk  Recent Flowsheet Documentation  Taken 6/10/2025 1808 by Lavonne Ponce RN  Safety Promotion/Fall Prevention:   activity supervised   safety round/check completed  Taken 6/10/2025 1728 by Lavonne Ponce RN  Safety Promotion/Fall Prevention:   activity supervised   safety round/check completed  Taken 6/10/2025 1600 by Lavonne Ponce RN  Safety Promotion/Fall Prevention:   activity supervised   safety round/check completed  Taken 6/10/2025 1509 by Lavonne Ponce RN  Safety Promotion/Fall Prevention:   activity supervised   safety round/check completed  Intervention: Prevent Skin Injury  Recent Flowsheet Documentation  Taken 6/10/2025 1808 by Lavonne Ponce RN  Body Position: position changed independently  Skin Protection: skin sealant/moisture barrier applied  Taken 6/10/2025 1728 by Lavonne Ponce RN  Body Position: position changed independently  Skin Protection: skin sealant/moisture barrier applied  Taken 6/10/2025 1600 by Lavonne Ponce RN  Body Position: position changed independently  Skin Protection: skin sealant/moisture barrier applied  Taken 6/10/2025 1509 by Lavonne Ponce RN  Body Position: position changed independently  Skin Protection: skin sealant/moisture barrier applied  Goal: Optimal Comfort and Wellbeing  Outcome: Progressing  Goal: Readiness for Transition of Care  Outcome: Progressing     Problem: Comorbidity Management  Goal: Blood Pressure in Desired Range  Outcome: Progressing  Intervention: Maintain Blood Pressure Management  Recent Flowsheet Documentation  Taken 6/10/2025 1509 by Lavonne Ponce RN  Medication Review/Management: medications reviewed     Problem: Pain Acute  Goal: Optimal Pain Control and  Function  Outcome: Progressing  Intervention: Prevent or Manage Pain  Recent Flowsheet Documentation  Taken 6/10/2025 1509 by Lavonne Ponce, RN  Medication Review/Management: medications reviewed   Goal Outcome Evaluation:

## 2025-06-11 NOTE — SIGNIFICANT NOTE
06/11/25 0715   Transfer of Care   Person Report Given To SOURAV Gong.  right radial cath viewed.No further questions asked

## 2025-06-11 NOTE — NURSING NOTE
Discharge instructions given. Pt expressed thru teach back method what meds to take, what side effects to watch  for and when to follow up with his physicians. PIV discontinued

## 2025-06-11 NOTE — PAYOR COMM NOTE
"Gee Whitney \"Rajat\" (74 y.o. Male)       Date of Birth   1950    Social Security Number       Address   [address] [facility] [address]    Home Phone   [phone]    MRN   [id number]       P[employer]ec[employer]al   [employer]ism    Marital Status                               Admission Date   6/10/2025    Admission Type   Emergency    Admitting Provider   Shaun Joseph MD    Attending Provider   Shaun Joseph MD    Department, Room/Bed   [facility] 5F, S517/1       Discharge Date       Discharge Disposition       Discharge Destination                                 Attending Provider: Shaun Joseph MD    Allergies: No Known Allergies    Isolation: None   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)   Code Status: CPR    Ht: 177.8 cm (70\")   Wt: 77.1 kg (170 lb)    Admission Cmt: None   Principal Problem: Heart failure [I50.9]                   Active Insurance as of 6/10/2025       Primary Coverage       Payor Plan Insurance Group Employer/Plan Group    ANTH MEDICARE REPLACEMENT [facility] MEDICARE ADVANTAGE HMO [id number]       Payor Plan Address Payor Plan Phone Number Payor Plan Fax Number Effective Dates    [address] [phone]  2025 - None Entered    [facility] [address]         Subscriber Name Subscriber Birth Date Member ID       GEE WHITNEY 1950 [id number]                     Emergency Contacts        (Rel.) Home Phone Work Phone Mobile Phone    Kiersten Whitney (Spouse) -- -- [phone]                 History & Physical        Liza Gibson APRN at 06/10/25 1316       Attestation signed by Shaun Joseph MD at 06/10/25 1619      I have reviewed this documentation and agree.                      [facility] HISTORY AND PHYSICAL    Gee Whitney  : 1950  MRN:[id number]  IDENTIFICATION: A 74 y.o. male     Date of Admission:6/10/2025      CC:   Chief Complaint   Patient " "presents with    Syncope       HPI: Donte Whitney is a 74-year-old male with a past medical history listed below who presents to [facility] ED secondary to syncopal episode.  Patient had just finished eating a large meal out with his wife and began feeling SOB, weak and dizzy while walking so he asked his wife to drive home.  While in the car his wife looked over and noted he was unconscious and slumped over.  Patient was unconscious for \" at least 1 to 2 minutes\" before coming to and reporting the need to have a bowel movement.  Patient and wife were concerned as he recently saw Dr. Joseph in the office on 5/29 and was planning to have a LHC for worsening EF and progression of class    III symptoms , but was unfortunately declined by insurance.  Patient endorses feelings of ROWLEY AND dizziness/lightheadedness for several weeks now.     Upon arrival to the ED ischemic workup showed elevated and flat troponin's, EKG with sinus bradycardia and occasional PVCs with new ST depression in lead I, aVL.       Problem List:   Coronary artery disease:  LH for MI, 1977, Dr. Carlos Gilmore: Occluded LAD with reported collaterals per patient report, normal LVEF.  MPS, 2014: EF 50%, anterior apical scar. No ischemia.  US aorta, 04/27/2016: No abdominal aortic aneurysm.  Exercise MPS, 4/1/2019: prior infarction of the anterior apex, and anterior septum with no significant ischemia noted. EF 40%. Severe hypokinesis.  5/9/2023 MPS with infarct of anterior and significant ischemia.  EF 32%.  Severe hypokinesis of anterior and septal wall.  5/9/2025 ECHO EF 31-35%. No significant valve disease. With significant WMA of the anterior, septal and apical walls.   Hypertension.   Dyslipidemia.  COPD  COVID-19 1/2022  Did not require hospitalization  History of elevated LFTs.   Chronic Bronchitis  Follows with pulmonology [facility]   Traumatic hemothorax 2018  Secondary to chronic bronchitis with coughing fits  Renal cyst.  Hypothyroidism.  Bilateral " cataract surgery.    Remote tobacco abuse.    ROS:   @Cardiovascular ROS: positive for - dyspnea on exertion and loss of consciousness@    Home Medications:   Prior to Admission Medications       Prescriptions Last Dose Informant Patient Reported? Taking?    albuterol sulfate  (90 Base) MCG/ACT inhaler   No Yes    Inhale 2 puffs Every 4 (Four) Hours As Needed for Wheezing.    amLODIPine (NORVASC) 2.5 MG tablet   Yes Yes    Take 2 tablets by mouth Daily.    aspirin  MG tablet   Yes Yes    Take 1 tablet by mouth Daily.    Patient taking differently:  Take 1 tablet by mouth Every Other Day.    atorvastatin (LIPITOR) 40 MG tablet   Yes Yes    Take 1 tablet by mouth Every Night.    Patient taking differently:  Take 1 tablet by mouth Every Other Day.    furosemide (LASIX) 20 MG tablet   Yes Yes    Take 1 tablet by mouth As Needed.    traZODone (DESYREL) 50 MG tablet   Yes Yes    Take 1 tablet by mouth Every Night.    carvedilol (COREG) 6.25 MG tablet   No No    Take 1 tablet by mouth 2 (Two) Times a Day.    doxazosin (CARDURA) 1 MG tablet   Yes No    Take 1 tablet by mouth Every Night.    Patient taking differently:  Take 4 tablets by mouth Every Night.    ipratropium (ATROVENT) 0.06 % nasal spray   Yes No    Administer 2 sprays into the nostril(s) as directed by provider 3 (Three) Times a Day.    levothyroxine (SYNTHROID, LEVOTHROID) 75 MCG tablet   Yes No    Take 1 tablet by mouth Daily.    losartan (COZAAR) 100 MG tablet   Yes No    Take 1 tablet by mouth Daily.    Multiple Vitamins-Minerals (ICAPS AREDS 2 PO)   Yes No    Take 2 tablets by mouth Daily.    nitroglycerin (NITROSTAT) 0.4 MG SL tablet   Yes No    Place 1 tablet under the tongue Every 5 (Five) Minutes As Needed.    Omega-3 Fatty Acids (FISH OIL) 1200 MG capsule delayed-release   Yes No    Take 1 capsule by mouth Daily.    Polyethylene Glycol 3350 (MIRALAX PO)   Yes No    Take  by mouth Every Other Day. Mixes with orange juice    tiotropium  "bromide-olodaterol (Stiolto Respimat) 2.5-2.5 MCG/ACT aerosol solution inhaler   No No    Inhale 2 puffs Daily As Needed (cough).    vitamin D (ERGOCALCIFEROL) 1.25 MG (90693 UT) capsule capsule   Yes No    Take 1 capsule by mouth 1 (One) Time Per Week.            Surgical History:   Past Surgical History:   Procedure Laterality Date    BRONCHOSCOPY      CARDIAC CATHETERIZATION      HERNIA REPAIR      TENDON RELEASE      Left thumb        Allergies: No Known Allergies    Social History:   Social History     Socioeconomic History    Marital status:     Number of children: 2   Tobacco Use    Smoking status: Former     Current packs/day: 0.00     Average packs/day: 1 pack/day for 10.0 years (10.0 ttl pk-yrs)     Types: Cigarettes     Start date: 1967     Quit date: 1977     Years since quittin.4     Passive exposure: Past    Smokeless tobacco: Never    Tobacco comments:     Stopped the day i had heart attack   Vaping Use    Vaping status: Never Used   Substance and Sexual Activity    Alcohol use: Yes     Comment: Maybe couple drinks a month    Drug use: No    Sexual activity: Not Currently     Partners: Female     Birth control/protection: None       Family History:   Family History   Problem Relation Age of Onset    Heart failure Mother     Heart attack Mother         Had triple bypass     Heart disease Mother         Had triple bypass     Other Father 28         in car accident    Heart attack Sister     Mitral valve prolapse Sister     Asthma Brother     Stroke Brother     Hypertension Child        Objective     /75 (Patient Position: Standing)   Pulse 59   Temp 98.2 °F (36.8 °C) (Oral)   Resp 18   Ht 177.8 cm (70\")   Wt 77.1 kg (170 lb)   SpO2 97%   BMI 24.39 kg/m²     Intake/Output Summary (Last 24 hours) at 6/10/2025 1340  Last data filed at 6/10/2025 1018  Gross per 24 hour   Intake 250 ml   Output --   Net 250 ml       Physical Exam  Vitals reviewed. "   Constitutional:       Appearance: Normal appearance.   HENT:      Head: Normocephalic.      Mouth/Throat:      Mouth: Mucous membranes are moist.      Pharynx: Oropharynx is clear.   Eyes:      Pupils: Pupils are equal, round, and reactive to light.   Cardiovascular:      Rate and Rhythm: Regular rhythm. Bradycardia present.      Pulses: Normal pulses.      Heart sounds: Normal heart sounds.   Pulmonary:      Effort: Pulmonary effort is normal.      Breath sounds: Normal breath sounds.   Musculoskeletal:         General: No swelling.      Cervical back: Normal range of motion.   Skin:     General: Skin is warm and dry.      Capillary Refill: Capillary refill takes 2 to 3 seconds.   Neurological:      General: No focal deficit present.      Mental Status: He is alert. Mental status is at baseline.   Psychiatric:         Mood and Affect: Mood normal.         Behavior: Behavior is cooperative.           Labs:  Results from last 7 days   Lab Units 06/10/25  1022   SODIUM mmol/L 140   POTASSIUM mmol/L 4.4   CHLORIDE mmol/L 106   CO2 mmol/L 24.0   BUN mg/dL 35.0*   CREATININE mg/dL 1.48*   GLUCOSE mg/dL 157*   CALCIUM mg/dL 9.3     Results from last 7 days   Lab Units 06/10/25  1140 06/10/25  1022   HSTROP T ng/L 33* 37*     Results from last 7 days   Lab Units 06/10/25  1022   WBC 10*3/mm3 6.60   HEMOGLOBIN g/dL 13.8   HEMATOCRIT % 41.1   PLATELETS 10*3/mm3 146     Results from last 7 days   Lab Units 06/10/25  1022   MAGNESIUM mg/dL 2.2     Results from last 7 days   Lab Units 06/10/25  1140   CHOLESTEROL mg/dL 144   TRIGLYCERIDES mg/dL 212*   HDL CHOL mg/dL 33*         Results from last 7 days   Lab Units 06/10/25  1022   HEMOGLOBIN A1C % 5.60             Imaging/Diagnostics:     EKG:   Test Reason : Syncope  Blood Pressure :   */*   mmHG  Vent. Rate :  56 BPM     Atrial Rate :  56 BPM     P-R Int : 164 ms          QRS Dur : 102 ms      QT Int : 458 ms       P-R-T Axes :  66  35 112 degrees    QTcB Int : 441 ms      Sinus bradycardia with occasional premature ventricular complexes and  fusion complexes  Possible Left atrial enlargement  Inferior infarct (cited on or before 07-Mar-2018)  Anteroseptal infarct (cited on or before 07-Mar-2018)  T wave abnormality, consider lateral ischemia  Abnormal ECG  When compared with ECG of 14-Feb-2023 00:27,  fusion complexes are now present  premature ventricular complexes are now present          Results for orders placed during the hospital encounter of 05/09/25    Adult Transthoracic Echo Complete W/ Cont if Necessary Per Protocol    Interpretation Summary    Left ventricular systolic function is moderately decreased.Left ventricular ejection fraction appears to be 31 - 35%.    The following left ventricular wall segments are dyskinetic: mid anteroseptal. The following left ventricular wall segments are akinetic: mid anterior, apical anterior, apical septal and apex. The following left ventricular wall segments are aneurysmal: mid anteroseptal.    No hemodynamically significant valvular heart      Assessment and Plan:     ASSESSMENT:   HFrEF  EF 31-35%, decreased from 32% in 2023   GDMT: Coreg, Losartan   Lasix 20 mg daily  Chest x-ray with no cardiopulmonary process, no CHF presentation.   CAD  ASA, statin  Troponin's 37 --> 33  EKG with new ST depression in lateral leads  Acute syncopal episode with SOB, negative for CP  HTN, goal < 130/80  Controlled on amlodipine 2.5 mg, doxazosin 1 mg, daily   Dyslipidemia  LDL well-controlled at 76  Triglycerides elevated at 212, hemoglobin A1c normal at 5.6, although seems to have chronically elevated glucoses  DEB on CKD   Creatinine 1.48 on presentation, baseline appears to be ~1-1.2.  Follows with nephrology   Providing pre-procedure hydration   Hypothyroidism  Pending thyroid labs  Synthroid 75 mcg    PLAN:  LHC +/- CBI via right radial artery. Patient has been NPO since 08:30 today. The risks, benefits, and alternatives of the procedure  have been reviewed and the patient wishes to proceed.   Will admit for overnight observation given presentation to ED of syncopal event. Continue to monitor HR and rhythm.   CBC and BMP check in the a.m.   Remainder of recommendations to come post procedure.       GAURI Durand        Electronically signed by Shaun Joseph MD at 06/10/25 6990       Physician Progress Notes (all)    No notes of this type exist for this encounter.

## 2025-06-11 NOTE — PROGRESS NOTES
Pt. Referred for Phase II Cardiac Rehab. Staff discussed benefits of exercise, program protocol, and educational material provided. Teach back verified. Patient scheduled for orientation at [facility] on Thursday, June 26th 2025 at 1:00pm.

## 2025-06-11 NOTE — PLAN OF CARE
Goal Outcome Evaluation:patient is a S/P LHC via right radial artery. Patient developed a large hematoma in the process of removing the sheath. Pressure held on two occasions for a total of 30 minutes. Right wrist is bruised, swollen and tender to touch but pt denies numbness or tingling to the extremity. Arm elevated on pillow above the heart and covered with a warm blanket.vital signs stable.

## 2025-06-11 NOTE — CASE MANAGEMENT/SOCIAL WORK
Discharge Planning Assessment  [facility]     Patient Name: Gee Whitney  MRN: [id number]  Today's Date: 6/11/2025    Admit Date: 6/10/2025    Plan: Home at DC   Discharge Needs Assessment       Row Name 06/11/25 1141       Living Environment    People in Home spouse    Current Living Arrangements home       Discharge Needs Assessment    Equipment Currently Used at Home none    Equipment Needed After Discharge none    Discharge Coordination/Progress Denies current use of DME, HH or outpt services.                   Discharge Plan       Row Name 06/11/25 1142       Plan    Plan Home at DC    Patient/Family in Agreement with Plan yes    Plan Comments I spoke with the pt and his spouse. They deny any DC needs at this time. Spouse will transport at DC.    Final Discharge Disposition Code 01 - home or self-care      Row Name 06/11/25 0901       Plan    Final Discharge Disposition Code 01 - home or self-care                    Expected Discharge Date and Time       Expected Discharge Date Expected Discharge Time    Jun 13, 2025            Demographic Summary    No documentation.                  Functional Status       Row Name 06/11/25 1141       Functional Status    Usual Activity Tolerance good       Functional Status, IADL    Medications independent    Meal Preparation independent    Housekeeping independent    Laundry independent    Shopping independent                   Psychosocial    No documentation.                  Abuse/Neglect    No documentation.                  Legal    No documentation.                  Substance Abuse    No documentation.                  Patient Forms    No documentation.                     Joseline Cuba RN

## 2025-06-11 NOTE — DISCHARGE SUMMARY
[facility] CARDIOLOGY DISCHARGE NOTE      Date of Discharge:  6/11/2025    Patient Care Team:  Pramod Blevins MD as PCP - General (Family Medicine)  Gab Brooke MD as Consulting Physician (Pulmonary Disease)  Gab Brooke MD as Consulting Physician (Pulmonary Disease)    Discharge Diagnosis: HFrEF, CAD post PCI     Presenting Problem  CAD (coronary artery disease) [I25.10]  HFrEF (heart failure with reduced ejection fraction) [I50.20]    No Known Allergies    Discharge Medications     Discharge Medications        New Medications        Instructions Start Date   clopidogrel 75 MG tablet  Commonly known as: PLAVIX   75 mg, Oral, Daily   Start Date: June 12, 2025     sacubitril-valsartan 24-26 MG tablet  Commonly known as: ENTRESTO   1 tablet, Oral, Every 12 Hours Scheduled      spironolactone 25 MG tablet  Commonly known as: ALDACTONE   25 mg, Oral, Daily      terazosin 1 MG capsule  Commonly known as: HYTRIN   1 mg, Oral, Nightly             Changes to Medications        Instructions Start Date   aspirin 81 MG EC tablet  What changed:   medication strength  how much to take   81 mg, Oral, Daily   Start Date: June 12, 2025     atorvastatin 40 MG tablet  Commonly known as: LIPITOR  What changed: when to take this   40 mg, Oral, Nightly             Continue These Medications        Instructions Start Date   albuterol sulfate  (90 Base) MCG/ACT inhaler  Commonly known as: PROVENTIL HFA;VENTOLIN HFA;PROAIR HFA   2 puffs, Inhalation, Every 4 Hours PRN      carvedilol 6.25 MG tablet  Commonly known as: COREG   6.25 mg, Oral, 2 Times Daily      Fish Oil 1200 MG capsule delayed-release capsule   1,200 mg, Daily      furosemide 20 MG tablet  Commonly known as: LASIX   20 mg, As Needed      ICAPS AREDS 2 PO   2 tablets, Daily      ipratropium 0.06 % nasal spray  Commonly known as: ATROVENT   2 sprays, 3 Times Daily      levothyroxine 75 MCG tablet  Commonly known as: SYNTHROID, LEVOTHROID   75 mcg,  "Daily      MIRALAX PO   Every Other Day      nitroglycerin 0.4 MG SL tablet  Commonly known as: NITROSTAT   0.4 mg, Every 5 Minutes PRN      Stiolto Respimat 2.5-2.5 MCG/ACT aerosol solution inhaler  Generic drug: tiotropium bromide-olodaterol   2 puffs, Inhalation, Daily PRN      traZODone 50 MG tablet  Commonly known as: DESYREL   1 tablet, Nightly      vitamin D 1.25 MG (08100 UT) capsule capsule  Commonly known as: ERGOCALCIFEROL   50,000 Units, Weekly             Stop These Medications      amLODIPine 2.5 MG tablet  Commonly known as: NORVASC     doxazosin 1 MG tablet  Commonly known as: CARDURA     losartan 100 MG tablet  Commonly known as: COZAAR              Procedures Performed  Procedure(s):  Left Heart Cath - Right radial access  Functional Flow Carbon  Stent KARSTEN coronary     AZ sequential 70% proximal LAD stenosis with markedly abnormal RFR 0.59.  Treated 3.5 x 23 Xience KARSTEN postdilated to 4.0 NC trek.    Otherwise no significant coronary disease    LVEF 25-30% with hypokinetic anterior wall    Cardiovascular Disease Risk Factors  hyptertension, hyperlipidemia, family history, increased age, male gender    History of Present Illness & Hospital Course  Gee Whitney is a 74 y.o. male who presented with syncopal episode.  Patient had just finished eating a large meal out with his wife and began feeling SOB, weak and dizzy while walking so he asked his wife to drive home.  While in the car his wife looked over and noted he was unconscious and slumped over.  Patient was unconscious for \" at least 1 to 2 minutes\" before coming to and reporting the need to have a bowel movement.  Patient and wife were concerned as he recently saw Dr. Joseph in the office on 5/29 and was planning to have a [facility] for worsening EF and progression of class  III symptoms , but was unfortunately declined by insurance.  Patient endorses feelings of ROWLEY, dizziness/lightheadedness for several weeks now.      Upon arrival to the ED " ischemic workup showed elevated and flat troponin's, EKG with sinus bradycardia and occasional PVCs with new ST depression in lead I, aVL. Patient had an urgent LHC which resulted in a KARSTEN as to the LAD.  Patient remained in the hospital overnight for observation secondary to syncopal presentation.  Upon assessment this morning patient had normal BMP and CBC labs.  Morning EKG showed sinus arrhythmia for which 2 g magnesium sulfate was given to provide further cardiac membrane stability, no noted additional sinus arrhythmia.  Patient denies any CP, SOB, lightheadedness, dizziness, presyncopal feelings, syncope.      Problem List:  HFrEF  EF 31-35%, decreased from 32% in 2023   GDMT: Coreg, Entresto, Spironolactone --> can check renal function at follow up appointment and add SGLT2i if able.    Lasix 20 mg daily PRN  Chest x-ray with no cardiopulmonary process, no CHF presentation.   CAD  ASA, Plavix, High intensity statin    Troponin's 37 --> 33  EKG with new ST depression in lateral leads  Acute syncopal episode with SOB, negative for CP  HTN, goal < 130/80  Controlled   Doxazosin 1mg daily, will follow up with PCP in 1 week for BP check.    Dyslipidemia  Lipitor 40mg   LDL well-controlled at 76  Triglycerides elevated at 212, hemoglobin A1c normal at 5.6, although seems to have chronically elevated glucoses  DEB on CKD   Creatinine 1.48 on presentation, baseline appears to be ~1-1.2.  Follows with nephrology   Providing pre-procedure hydration   Post cath creatinine 1.12   Hypothyroidism  Normal thyroid labs   Synthroid 75 mcg      Labs  Results from last 7 days   Lab Units 06/11/25  0452   SODIUM mmol/L 138   POTASSIUM mmol/L 4.0   CHLORIDE mmol/L 107   CO2 mmol/L 23.0   BUN mg/dL 21.2   CREATININE mg/dL 1.12   GLUCOSE mg/dL 101*   CALCIUM mg/dL 8.1*     Results from last 7 days   Lab Units 06/11/25  0453   WBC 10*3/mm3 9.14   HEMOGLOBIN g/dL 12.9*   HEMATOCRIT % 38.9   PLATELETS 10*3/mm3 150     Results from  last 7 days   Lab Units 06/11/25  0452   CHOLESTEROL mg/dL 133   TRIGLYCERIDES mg/dL 98   HDL CHOL mg/dL 36*   LDL CHOL mg/dL 78     Results from last 7 days   Lab Units 06/10/25  1022   HEMOGLOBIN A1C % 5.60     Results from last 7 days   Lab Units 06/10/25  1140 06/10/25  1022   HSTROP T ng/L 33* 37*      I reviewed the patient's new clinical results.        Vital Signs  Temp:  [98 °F (36.7 °C)-99.1 °F (37.3 °C)] 99.1 °F (37.3 °C)  Heart Rate:  [53-97] 60  Resp:  [16-18] 16  BP: (123-189)/() 145/70  Temp  Min: 98 °F (36.7 °C)  Max: 99.1 °F (37.3 °C)   BP  Min: 123/81  Max: 189/99   Pulse  Min: 53  Max: 97   Resp  Min: 16  Max: 18   SpO2  Min: 91 %  Max: 98 %   Flow (L/min) (Oxygen Therapy)  Min: 2  Max: 2   No data recorded     Discharge Disposition: Home with wife   Home or Self Care  Discharge Diet: Heart Health, 2g Sodium, 24 hr 2L Fluid restriction     Activity at Discharge: No lifting or pulling >5lbs with right arm for 4 days.     Follow-up Appointments  Future Appointments   Date Time Provider Department Center   6/26/2025  1:00 PM ORIENTATION [facility]   7/28/2025  9:00 AM Shaun Joseph MD [facility]     Additional Instructions for the Follow-ups that You Need to Schedule       Discharge Follow-up with PCP   As directed       Currently Documented PCP:    Pramod Blevins MD    PCP Phone Number:    [phone]     Follow Up Details: Follow up post PCI after syncopal episode, repeat BMP, Mag, and CBC.        Discharge Follow-up with Specialty: Dr. Joseph; 2 Weeks   As directed      Specialty: Dr. Joseph   Follow Up: 2 Weeks   Follow Up Details: Hospital FU for CAD with Syncopal Presentation, HFrEF        Basic Metabolic Panel    Jun 18, 2025 (Approximate)      Release to patient: Routine Release        CBC (No Diff)    Jun 18, 2025 (Approximate)      Release to patient: Routine Release        Magnesium    Jun 18, 2025 (Approximate)      Release to patient: Routine  GAURI Pryor spent > 30 minutes reviewing hospital encounters, labs, vitals, imaging, and medications prior to discharge as well as ordering and coordinating discharge follow-up.

## 2025-06-11 NOTE — SIGNIFICANT NOTE
Patient arrived to the floor with TRBand in place. Hematoma present on arrival. Second TRBand placed By Cath Lab RN. 20 minutes later second TRband removed. Site checked 20 minutes later, Hematoma formed. Manual pressure applied x 20 minutes. Provider notified, no new orders. Occlusive dressing applied. Bleeding continued. Manual pressure reapplied x 25 minutes. Pressure removed, nonocclusive dressings in place. See flowsheet for assessments and specific times.

## 2025-06-12 LAB — ACT BLD: 291 SECONDS (ref 82–152)

## 2025-06-12 NOTE — OUTREACH NOTE
Prep Survey      Flowsheet Row Responses   [facility] patient discharged from? [place]   Is LACE score < 7 ? Yes   Eligibility Readm Mgmt   Discharge diagnosis Heart failure-Left heart cath   Does the patient have one of the following disease processes/diagnoses(primary or secondary)? CHF   Does the patient have Home health ordered? No   Is there a DME ordered? No   Prep survey completed? Yes            THEODORE MENDOZA - Registered Nurse

## 2025-06-14 LAB
QT INTERVAL: 416 MS
QT INTERVAL: 434 MS
QTC INTERVAL: 408 MS
QTC INTERVAL: 458 MS

## 2025-06-18 ENCOUNTER — READMISSION MANAGEMENT (OUTPATIENT)
Dept: CALL CENTER | Facility: HOSPITAL | Age: 75
End: 2025-06-18
Payer: MEDICARE

## 2025-06-18 NOTE — OUTREACH NOTE
CHF Week 1 Survey      Flowsheet Row Responses   [facility] patient discharged from? [place]   Does the patient have one of the following disease processes/diagnoses(primary or secondary)? CHF   CHF Week 1 attempt successful? Yes   Call start time 1508   Call end time 1511   Discharge diagnosis Heart failure-Left heart cath   Person spoke with today (if not patient) and relationship Patient   Medication alerts for this patient Plavix, Entresto, Aldactone, Hytrin   Meds reviewed with patient/caregiver? Yes   Is the patient having any side effects they believe may be caused by any medication additions or changes? Yes   Side effects comments  Patient states he had a nose bleed this morning. He was able to get the bleeding stopped. He attributes it to the Plavix. Advised Plavix does cause bleeding/ bruising more easily.   Does the patient have all medications ordered at discharge? Yes   Prescription comments No questions or concerns.   Is the patient taking all medications as directed (includes completed medication regime)? Yes   Comments regarding appointments Cardiac Rehab appt on 6/26/25 at 1:00 PM. Cardiology hospital / appt on 7/14/25 at 11:00AM with GAURI Garcia. Patient states this is the earliest he could get an appt with Cardiology.   Does the patient have a primary care provider?  Yes   Does the patient have an appointment with their PCP within 7 days of discharge? Yes   Comments regarding PCP PCP--Dr. Pramod Blevins--patient saw PCP on 6/12/25 for hospital /u.   Has the patient kept scheduled appointments due by today? Yes   Has home health visited the patient within 72 hours of discharge? N/A   Has all DME been delivered? No   Psychosocial issues? No   Comments Patient states he is doing well. Cath site in right wrist healed. He states his arm is still bruised from the procedure. Discussed daily weights and s/s of CHF and when to seek care.   Did the patient receive a copy of their  discharge instructions? Yes   Nursing interventions Reviewed instructions with patient   What is the patient's perception of their health status since discharge? Improving   Nursing interventions Nurse provided patient education   Is the patient able to teach back signs and symptoms of worsening condition? (i.e. weight gain, shortness of air, etc.) Yes   If the patient is a current smoker, are they able to teach back resources for cessation? Not a smoker   Is the patient/caregiver able to teach back the hierarchy of who to call/visit for symptoms/problems? PCP, Specialist, Home health nurse, Urgent Care, ED, 911 Yes   CHF Zone this Call Green Zone   Green Zone Patient reports doing well, No new or worsening shortness of breath, Physical activity level is normal for you, No new swelling -  feet, ankles and legs look normal for you, No chest pain   Green Zone Interventions Daily weight check, Meds as directed, Low sodium diet, Follow up visits planned    CHF Week 1 call completed? Yes   Is the patient interested in additional calls from an ambulatory ? No   Would this patient benefit from a Referral to Amb Social Work? No   Wrap up additional comments No concerns or needs at this time.   Call end time 1511            Kira HOLCOMB - Registered Nurse      Kira HOLCOMB - Registered Nurse

## 2025-06-26 ENCOUNTER — TREATMENT (OUTPATIENT)
Dept: CARDIAC REHAB | Facility: HOSPITAL | Age: 75
End: 2025-06-26
Payer: MEDICARE

## 2025-06-26 DIAGNOSIS — Z95.5 STENTED CORONARY ARTERY: ICD-10-CM

## 2025-06-26 PROCEDURE — 93798 PHYS/QHP OP CAR RHAB W/ECG: CPT

## 2025-06-26 NOTE — PROGRESS NOTES
Attended Phase II Cardiac Rehab orientation. Medication and health history recorded. See [facility] for details.

## 2025-06-27 ENCOUNTER — READMISSION MANAGEMENT (OUTPATIENT)
Dept: CALL CENTER | Facility: HOSPITAL | Age: 75
End: 2025-06-27
Payer: MEDICARE

## 2025-06-27 NOTE — OUTREACH NOTE
CHF Week 2 Survey      Flowsheet Row Responses   [facility] patient discharged from? [place]   Does the patient have one of the following disease processes/diagnoses(primary or secondary)? CHF   Week 2 attempt successful? Yes   Call start time 1141   Call end time 1142   Discharge diagnosis Heart failure-Left heart cath   Meds reviewed with patient/caregiver? Yes   Is the patient having any side effects they believe may be caused by any medication additions or changes? No   Does the patient have all medications ordered at discharge? Yes   Is the patient taking all medications as directed (includes completed medication regime)? Yes   Does the patient have a primary care provider?  Yes   Does the patient have an appointment with their PCP within 7 days of discharge? Yes   Has the patient kept scheduled appointments due by today? Yes   Comments cardiology 7/14   Psychosocial issues? No   What is the patient's perception of their health status since discharge? Improving   If the patient is a current smoker, are they able to teach back resources for cessation? Not a smoker   Is the patient/caregiver able to teach back the hierarchy of who to call/visit for symptoms/problems? PCP, Specialist, Home health nurse, Urgent Care, ED, 911 Yes   CHF Zone this Call Green Zone   Green Zone Patient reports doing well, No new swelling -  feet, ankles and legs look normal for you, Physical activity level is normal for you, No new or worsening shortness of breath, Weight check stable, No chest pain   Green Zone Interventions Daily weight check, Low sodium diet, Meds as directed, Follow up visits planned   CHF Week 2 call completed? Yes   Revoked No further contact(revokes)-requires comment   Graduated/Revoked comments No questions or needs at this time.   Call end time 1142            Shirin ROGERS - Licensed Nurse

## 2025-07-03 ENCOUNTER — TREATMENT (OUTPATIENT)
Dept: CARDIAC REHAB | Facility: HOSPITAL | Age: 75
End: 2025-07-03
Payer: MEDICARE

## 2025-07-03 DIAGNOSIS — Z95.5 STENTED CORONARY ARTERY: Primary | ICD-10-CM

## 2025-07-03 PROCEDURE — 93797 PHYS/QHP OP CAR RHAB WO ECG: CPT

## 2025-07-03 PROCEDURE — 93798 PHYS/QHP OP CAR RHAB W/ECG: CPT

## 2025-07-03 NOTE — PROGRESS NOTES
Attended Phase II Cardiac Rehab. No medication changes reported. See [facility] for details.    Dr. Joseph's RN is called and left a voicemail regarding Mr. Whitney's drop in BP with exercise. He was symptomatic with these drops complaining of dizziness and lightheadedness. He is given water for hydration and rest periods. He states he does feel dizzy at home periodically but does not take his BP regularly.  Staff gives him a home BP log and encourages him to take his BP three times daily to establish a trend.   Staff faxes today's session to the cardiology office for review.

## 2025-07-07 ENCOUNTER — TREATMENT (OUTPATIENT)
Dept: CARDIAC REHAB | Facility: HOSPITAL | Age: 75
End: 2025-07-07
Payer: MEDICARE

## 2025-07-07 DIAGNOSIS — Z95.5 STENTED CORONARY ARTERY: Primary | ICD-10-CM

## 2025-07-07 PROCEDURE — 93798 PHYS/QHP OP CAR RHAB W/ECG: CPT

## 2025-07-09 ENCOUNTER — TREATMENT (OUTPATIENT)
Dept: CARDIAC REHAB | Facility: HOSPITAL | Age: 75
End: 2025-07-09
Payer: MEDICARE

## 2025-07-09 DIAGNOSIS — Z95.5 STENTED CORONARY ARTERY: Primary | ICD-10-CM

## 2025-07-09 PROCEDURE — 93798 PHYS/QHP OP CAR RHAB W/ECG: CPT

## 2025-07-11 ENCOUNTER — TREATMENT (OUTPATIENT)
Dept: CARDIAC REHAB | Facility: HOSPITAL | Age: 75
End: 2025-07-11
Payer: MEDICARE

## 2025-07-11 DIAGNOSIS — Z95.5 STENTED CORONARY ARTERY: Primary | ICD-10-CM

## 2025-07-11 PROCEDURE — 93798 PHYS/QHP OP CAR RHAB W/ECG: CPT

## 2025-07-11 NOTE — PROGRESS NOTES
Subjective:     Encounter Date:07/14/2025    Primary Care Physician: Pramod Blevins MD      Patient ID: Gee Whitney is a 74 y.o. male.    Chief Complaint:Coronary artery disease involving native coronary artery of    Problem List:   Coronary artery disease:  [facility] for MI, 1977, Dr. Carlos Gilmore: Occluded LAD with reported collaterals per patient report, normal LVEF.  MPS, 2014: EF 50%, anterior apical scar. No ischemia.  US aorta, 04/27/2016: No abdominal aortic aneurysm.  Exercise MPS, 4/1/2019: prior infarction of the anterior apex, and anterior septum with no significant ischemia noted. EF 40%. Severe hypokinesis.  5/9/2023 MPS with infarct of anterior and significant ischemia.  EF 32%.  Severe hypokinesis of anterior and septal wall.  5/9/2025 ECHO EF 31-35%. No significant valve disease. With significant WMA of the anterior, septal and apical walls.   6/10/2025  LHC 70% proximal LAD with abnormal RFR (3.5 x 23 Xience KARSTEN).  Otherwise no significant CAD.  EF of 25 to 30%.  Hypertension.   Dyslipidemia.  COPD  COVID-19 1/2022  Did not require hospitalization  History of elevated LFTs.   Chronic Bronchitis  Follows with pulmonology [facility]   Traumatic hemothorax 2018  Secondary to chronic bronchitis with coughing fits  Renal cyst.  Hypothyroidism.  Bilateral cataract surgery.    Remote tobacco abuse.  Hernia repair  Left thumb surgery        No Known Allergies      Current Outpatient Medications:     albuterol sulfate  (90 Base) MCG/ACT inhaler, Inhale 2 puffs Every 4 (Four) Hours As Needed for Wheezing., Disp: 18 g, Rfl: 3    aspirin 81 MG EC tablet, Take 1 tablet by mouth Daily., Disp: 90 tablet, Rfl: 3    atorvastatin (LIPITOR) 40 MG tablet, Take 1 tablet by mouth Every Night. (Patient taking differently: Take 2 tablets by mouth Every Night.), Disp: , Rfl:     carvedilol (COREG) 6.25 MG tablet, Take 1 tablet by mouth 2 (Two) Times a Day., Disp: 60 tablet, Rfl: 11    clopidogrel (PLAVIX) 75 MG  tablet, Take 1 tablet by mouth Daily., Disp: 90 tablet, Rfl: 1    furosemide (LASIX) 20 MG tablet, Take 1 tablet by mouth As Needed., Disp: , Rfl:     ipratropium (ATROVENT) 0.06 % nasal spray, Administer 2 sprays into the nostril(s) as directed by provider 3 (Three) Times a Day., Disp: , Rfl:     levothyroxine (SYNTHROID, LEVOTHROID) 75 MCG tablet, Take 1 tablet by mouth Daily., Disp: , Rfl:     Multiple Vitamins-Minerals (ICAPS AREDS 2 PO), Take 2 tablets by mouth Daily., Disp: , Rfl:     nitroglycerin (NITROSTAT) 0.4 MG SL tablet, Place 1 tablet under the tongue Every 5 (Five) Minutes As Needed., Disp: , Rfl:     Omega-3 Fatty Acids (FISH OIL) 1200 MG capsule delayed-release, Take 1 capsule by mouth Daily., Disp: , Rfl:     Polyethylene Glycol 3350 (MIRALAX PO), Take  by mouth Every Other Day. Mixes with orange juice, Disp: , Rfl:     sacubitril-valsartan (ENTRESTO) 24-26 MG tablet, Take 1 tablet by mouth Every 12 (Twelve) Hours., Disp: 180 tablet, Rfl: 3    spironolactone (ALDACTONE) 25 MG tablet, Take 1 tablet by mouth Daily. (Patient taking differently: Take 1 tablet by mouth As Needed.), Disp: 90 tablet, Rfl: 3    terazosin (HYTRIN) 1 MG capsule, Take 1 capsule by mouth Every Night., Disp: 90 capsule, Rfl: 3    tiotropium bromide-olodaterol (Stiolto Respimat) 2.5-2.5 MCG/ACT aerosol solution inhaler, Inhale 2 puffs Daily As Needed (cough)., Disp: 1 each, Rfl: 3    traZODone (DESYREL) 50 MG tablet, Take 1 tablet by mouth Every Night., Disp: , Rfl:     vitamin D (ERGOCALCIFEROL) 1.25 MG (91416 UT) capsule capsule, Take 1 capsule by mouth 1 (One) Time Per Week., Disp: , Rfl:         History of Present Illness    Patient presents today for follow-up status post cardiac catheterization and LAD stenting.  Medications were adjusted post procedure.  He was experiencing some symptomatic hypotension during cardiac rehab and his spironolactone was changed to as needed.  He stopped the medication on Friday.  Notes  "overall he feels better.  Went to cardiac rehab this morning and did not have any symptomatic hypotension.  Overall, feels that he is doing relatively well from cardiac standpoint.    The following portions of the patient's history were reviewed and updated as appropriate: allergies, current medications, past family history, past medical history, past social history, past surgical history and problem list.      Social History     Tobacco Use    Smoking status: Former     Current packs/day: 0.00     Average packs/day: 1 pack/day for 10.0 years (10.0 ttl pk-yrs)     Types: Cigarettes     Start date: 1967     Quit date: 1977     Years since quittin.5     Passive exposure: Past    Smokeless tobacco: Never    Tobacco comments:     Stopped the day i had heart attack   Vaping Use    Vaping status: Never Used   Substance Use Topics    Alcohol use: Yes     Comment: Maybe couple drinks a month    Drug use: No         ROS       Objective:   /66 (BP Location: Right arm, Patient Position: Sitting, Cuff Size: Adult)   Pulse 52   Ht 177.8 cm (70\")   Wt 76.5 kg (168 lb 9.6 oz)   SpO2 98%   BMI 24.19 kg/m²         Vitals reviewed.   Constitutional:       Appearance: Well-developed and not in distress.   Neck:      Vascular: No JVD.      Trachea: No tracheal deviation.   Pulmonary:      Effort: Pulmonary effort is normal.      Breath sounds: Normal breath sounds.   Cardiovascular:      Normal rate. Regular rhythm.      Murmurs: There is no murmur.   Edema:     Peripheral edema absent.   Musculoskeletal:         General: No deformity. Skin:     General: Skin is warm and dry.   Neurological:      Mental Status: Alert and oriented to person, place, and time.         Procedures          Assessment:   Assessment & Plan      Diagnoses and all orders for this visit:    1. Coronary artery disease involving native coronary artery of native heart without angina pectoris (Primary), stable.  No angina.  Attending cardiac " rehab.  On aspirin and Plavix.  Status post recent LAD stent.    2. Ischemic cardiomyopathy, stable.  No evidence of heart failure.  On Lasix and Entresto.    3. Essential hypertension, stable.  Recent symptomatic hypotension.  On beta-blocker.    4. Dyslipidemia, stable.  On statin.  Labs with primary care.      Plan:  Patient is overall stable from cardiac standpoint.  Continue current cardiac medications.  Follow-up in 2 months time with echocardiogram to reevaluate LVEF.       Kait ASTORGA             Dictated utilizing Dragon dictation

## 2025-07-14 ENCOUNTER — TREATMENT (OUTPATIENT)
Dept: CARDIAC REHAB | Facility: HOSPITAL | Age: 75
End: 2025-07-14
Payer: MEDICARE

## 2025-07-14 ENCOUNTER — OFFICE VISIT (OUTPATIENT)
Dept: CARDIOLOGY | Facility: CLINIC | Age: 75
End: 2025-07-14
Payer: MEDICARE

## 2025-07-14 VITALS
WEIGHT: 168.6 LBS | HEART RATE: 52 BPM | BODY MASS INDEX: 24.14 KG/M2 | OXYGEN SATURATION: 98 % | HEIGHT: 70 IN | DIASTOLIC BLOOD PRESSURE: 66 MMHG | SYSTOLIC BLOOD PRESSURE: 122 MMHG

## 2025-07-14 DIAGNOSIS — Z95.5 STENTED CORONARY ARTERY: Primary | ICD-10-CM

## 2025-07-14 DIAGNOSIS — I25.10 CORONARY ARTERY DISEASE INVOLVING NATIVE CORONARY ARTERY OF NATIVE HEART WITHOUT ANGINA PECTORIS: Primary | ICD-10-CM

## 2025-07-14 DIAGNOSIS — E78.5 DYSLIPIDEMIA: ICD-10-CM

## 2025-07-14 DIAGNOSIS — I10 ESSENTIAL HYPERTENSION: ICD-10-CM

## 2025-07-14 DIAGNOSIS — I25.5 ISCHEMIC CARDIOMYOPATHY: ICD-10-CM

## 2025-07-14 PROCEDURE — 1159F MED LIST DOCD IN RCRD: CPT | Performed by: NURSE PRACTITIONER

## 2025-07-14 PROCEDURE — 99214 OFFICE O/P EST MOD 30 MIN: CPT | Performed by: NURSE PRACTITIONER

## 2025-07-14 PROCEDURE — G2211 COMPLEX E/M VISIT ADD ON: HCPCS | Performed by: NURSE PRACTITIONER

## 2025-07-14 PROCEDURE — 93798 PHYS/QHP OP CAR RHAB W/ECG: CPT

## 2025-07-14 PROCEDURE — 3074F SYST BP LT 130 MM HG: CPT | Performed by: NURSE PRACTITIONER

## 2025-07-14 PROCEDURE — 1160F RVW MEDS BY RX/DR IN RCRD: CPT | Performed by: NURSE PRACTITIONER

## 2025-07-14 PROCEDURE — 3078F DIAST BP <80 MM HG: CPT | Performed by: NURSE PRACTITIONER

## 2025-07-14 NOTE — LETTER
July 14, 2025     Pramod Blevins MD  [address] [facility] [address]  [facility] [address]    Patient: Gee Whitney   YOB: 1950   Date of Visit: 7/14/2025     Dear Pramod Blevins MD:       Thank you for referring Gee Whitney to me for evaluation. Below are the relevant portions of my assessment and plan of care.    If you have questions, please do not hesitate to call me. I look forward to following Gee along with you.         Sincerely,        GAURI Garcia        CC: No Recipients    Kait Nobles APRN  07/14/25 1140  Sign when Signing Visit  Subjective:     Encounter Date:07/14/2025    Primary Care Physician: Pramod Blevins MD      Patient ID: Gee Whitney is a 74 y.o. male.    Chief Complaint:Coronary artery disease involving native coronary artery of    Problem List:   Coronary artery disease:  [facility] for [facility], 1977, Dr. Carlos Gilmore: Occluded LAD with reported collaterals per patient report, normal LVEF.  MPS, 2014: EF 50%, anterior apical scar. No ischemia.  US aorta, 04/27/2016: No abdominal aortic aneurysm.  Exercise [facility]PS, 4/1/2019: prior infarction of the anterior apex, and anterior septum with no significant ischemia noted. EF 40%. Severe hypokinesis.  5/9/2023 MPS with infarct of anterior and significant ischemia.  EF 32%.  Severe hypokinesis of anterior and septal wall.  5/9/2025 ECHO EF 31-35%. No significant valve disease. With significant WMA of the anterior, septal and apical walls.   6/10/2025  [facility] 70% proximal LAD with abnormal RFR (3.5 x 23 Xience KARSTEN).  Otherwise no significant CAD.  EF of 25 to 30%.  Hypertension.   Dyslipidemia.  COPD  COVID-19 1/2022  Did not require hospitalization  History of elevated LFTs.   Chronic Bronchitis  Follows with pulmonology [facility]   Traumatic hemothorax 2018  Secondary to chronic bronchitis with coughing fits  Renal cyst.  Hypothyroidism.  Bilateral cataract surgery.    Remote tobacco abuse.  Hernia repair  Left  thumb surgery        No Known Allergies      Current Outpatient Medications:   •  albuterol sulfate  (90 Base) MCG/ACT inhaler, Inhale 2 puffs Every 4 (Four) Hours As Needed for Wheezing., Disp: 18 g, Rfl: 3  •  aspirin 81 MG EC tablet, Take 1 tablet by mouth Daily., Disp: 90 tablet, Rfl: 3  •  atorvastatin (LIPITOR) 40 MG tablet, Take 1 tablet by mouth Every Night. (Patient taking differently: Take 2 tablets by mouth Every Night.), Disp: , Rfl:   •  carvedilol (COREG) 6.25 MG tablet, Take 1 tablet by mouth 2 (Two) Times a Day., Disp: 60 tablet, Rfl: 11  •  clopidogrel (PLAVIX) 75 MG tablet, Take 1 tablet by mouth Daily., Disp: 90 tablet, Rfl: 1  •  furosemide (LASIX) 20 MG tablet, Take 1 tablet by mouth As Needed., Disp: , Rfl:   •  ipratropium (ATROVENT) 0.06 % nasal spray, Administer 2 sprays into the nostril(s) as directed by provider 3 (Three) Times a Day., Disp: , Rfl:   •  levothyroxine (SYNTHROID, LEVOTHROID) 75 MCG tablet, Take 1 tablet by mouth Daily., Disp: , Rfl:   •  Multiple Vitamins-Minerals (ICAPS AREDS 2 PO), Take 2 tablets by mouth Daily., Disp: , Rfl:   •  nitroglycerin (NITROSTAT) 0.4 MG SL tablet, Place 1 tablet under the tongue Every 5 (Five) Minutes As Needed., Disp: , Rfl:   •  Omega-3 Fatty Acids (FISH OIL) 1200 MG capsule delayed-release, Take 1 capsule by mouth Daily., Disp: , Rfl:   •  Polyethylene Glycol 3350 (MIRALAX PO), Take  by mouth Every Other Day. Mixes with orange juice, Disp: , Rfl:   •  sacubitril-valsartan (ENTRESTO) 24-26 MG tablet, Take 1 tablet by mouth Every 12 (Twelve) Hours., Disp: 180 tablet, Rfl: 3  •  spironolactone (ALDACTONE) 25 MG tablet, Take 1 tablet by mouth Daily. (Patient taking differently: Take 1 tablet by mouth As Needed.), Disp: 90 tablet, Rfl: 3  •  terazosin (HYTRIN) 1 MG capsule, Take 1 capsule by mouth Every Night., Disp: 90 capsule, Rfl: 3  •  tiotropium bromide-olodaterol (Stiolto Respimat) 2.5-2.5 MCG/ACT aerosol solution inhaler, Inhale 2  "puffs Daily As Needed (cough)., Disp: 1 each, Rfl: 3  •  traZODone (DESYREL) 50 MG tablet, Take 1 tablet by mouth Every Night., Disp: , Rfl:   •  vitamin D (ERGOCALCIFEROL) 1.25 MG (24859 UT) capsule capsule, Take 1 capsule by mouth 1 (One) Time Per Week., Disp: , Rfl:         History of Present Illness    Patient presents today for follow-up status post cardiac catheterization and LAD stenting.  Medications were adjusted post procedure.  He was experiencing some symptomatic hypotension during cardiac rehab and his spironolactone was changed to as needed.  He stopped the medication on Friday.  Notes overall he feels better.  Went to cardiac rehab this morning and did not have any symptomatic hypotension.  Overall, feels that he is doing relatively well from cardiac standpoint.    The following portions of the patient's history were reviewed and updated as appropriate: allergies, current medications, past family history, past medical history, past social history, past surgical history and problem list.      Social History     Tobacco Use   • Smoking status: Former     Current packs/day: 0.00     Average packs/day: 1 pack/day for 10.0 years (10.0 ttl pk-yrs)     Types: Cigarettes     Start date: 1967     Quit date: 1977     Years since quittin.5     Passive exposure: Past   • Smokeless tobacco: Never   • Tobacco comments:     Stopped the day i had heart attack   Vaping Use   • Vaping status: Never Used   Substance Use Topics   • Alcohol use: Yes     Comment: Maybe couple drinks a month   • Drug use: No         ROS       Objective:   /66 (BP Location: Right arm, Patient Position: Sitting, Cuff Size: Adult)   Pulse 52   Ht 177.8 cm (70\")   Wt 76.5 kg (168 lb 9.6 oz)   SpO2 98%   BMI 24.19 kg/m²         Vitals reviewed.   Constitutional:       Appearance: Well-developed and not in distress.   Neck:      Vascular: No JVD.      Trachea: No tracheal deviation.   Pulmonary:      Effort: Pulmonary " effort is normal.      Breath sounds: Normal breath sounds.   Cardiovascular:      Normal rate. Regular rhythm.      Murmurs: There is no murmur.   Edema:     Peripheral edema absent.   Musculoskeletal:         General: No deformity. Skin:     General: Skin is warm and dry.   Neurological:      Mental Status: Alert and oriented to person, place, and time.         Procedures          Assessment:   Assessment & Plan     Diagnoses and all orders for this visit:    1. Coronary artery disease involving native coronary artery of native heart without angina pectoris (Primary), stable.  No angina.  Attending cardiac rehab.  On aspirin and Plavix.  Status post recent LAD stent.    2. Ischemic cardiomyopathy, stable.  No evidence of heart failure.  On Lasix and Entresto.    3. Essential hypertension, stable.  Recent symptomatic hypotension.  On beta-blocker.    4. Dyslipidemia, stable.  On statin.  Labs with primary care.      Plan:  Patient is overall stable from cardiac standpoint.  Continue current cardiac medications.  Follow-up in 2 months time with echocardiogram to reevaluate LVEF.       Kait ASTORGA             Dictated utilizing Dragon dictation

## 2025-07-16 ENCOUNTER — TREATMENT (OUTPATIENT)
Dept: CARDIAC REHAB | Facility: HOSPITAL | Age: 75
End: 2025-07-16
Payer: MEDICARE

## 2025-07-16 DIAGNOSIS — Z95.5 STENTED CORONARY ARTERY: Primary | ICD-10-CM

## 2025-07-16 PROCEDURE — 93798 PHYS/QHP OP CAR RHAB W/ECG: CPT

## 2025-07-18 ENCOUNTER — TREATMENT (OUTPATIENT)
Dept: CARDIAC REHAB | Facility: HOSPITAL | Age: 75
End: 2025-07-18
Payer: MEDICARE

## 2025-07-18 DIAGNOSIS — Z95.5 STENTED CORONARY ARTERY: Primary | ICD-10-CM

## 2025-07-18 PROCEDURE — 93798 PHYS/QHP OP CAR RHAB W/ECG: CPT

## 2025-07-21 ENCOUNTER — APPOINTMENT (OUTPATIENT)
Dept: CT IMAGING | Facility: HOSPITAL | Age: 75
End: 2025-07-21
Payer: MEDICARE

## 2025-07-21 ENCOUNTER — DOCUMENTATION (OUTPATIENT)
Dept: CARDIAC REHAB | Facility: HOSPITAL | Age: 75
End: 2025-07-21
Payer: MEDICARE

## 2025-07-21 ENCOUNTER — HOSPITAL ENCOUNTER (OUTPATIENT)
Facility: HOSPITAL | Age: 75
Setting detail: OBSERVATION
Discharge: HOME | End: 2025-07-23
Attending: EMERGENCY MEDICINE | Admitting: INTERNAL MEDICINE
Payer: MEDICARE

## 2025-07-21 DIAGNOSIS — R55 SYNCOPE AND COLLAPSE: ICD-10-CM

## 2025-07-21 DIAGNOSIS — A04.4 E. COLI GASTROENTERITIS: Primary | ICD-10-CM

## 2025-07-21 PROBLEM — R19.7 DIARRHEA: Status: ACTIVE | Noted: 2025-07-21

## 2025-07-21 LAB
ADV 40+41 DNA STL QL NAA+NON-PROBE: NOT DETECTED
ALBUMIN SERPL-MCNC: 4 G/DL (ref 3.5–5.2)
ALBUMIN/GLOB SERPL: 2.2 G/DL
ALP SERPL-CCNC: 65 U/L (ref 39–117)
ALT SERPL W P-5'-P-CCNC: 16 U/L (ref 1–41)
ANION GAP SERPL CALCULATED.3IONS-SCNC: 10.3 MMOL/L (ref 5–15)
AST SERPL-CCNC: 20 U/L (ref 1–40)
ASTRO TYP 1-8 RNA STL QL NAA+NON-PROBE: NOT DETECTED
BASOPHILS # BLD AUTO: 0.01 10*3/MM3 (ref 0–0.2)
BASOPHILS NFR BLD AUTO: 0.1 % (ref 0–1.5)
BILIRUB SERPL-MCNC: 0.4 MG/DL (ref 0–1.2)
BUN SERPL-MCNC: 32.6 MG/DL (ref 8–23)
BUN/CREAT SERPL: 20.5 (ref 7–25)
C CAYETANENSIS DNA STL QL NAA+NON-PROBE: NOT DETECTED
C COLI+JEJ+UPSA DNA STL QL NAA+NON-PROBE: NOT DETECTED
CALCIUM SPEC-SCNC: 9.1 MG/DL (ref 8.6–10.5)
CHLORIDE SERPL-SCNC: 106 MMOL/L (ref 98–107)
CO2 SERPL-SCNC: 19.7 MMOL/L (ref 22–29)
CREAT SERPL-MCNC: 1.59 MG/DL (ref 0.76–1.27)
CRYPTOSP DNA STL QL NAA+NON-PROBE: NOT DETECTED
D-LACTATE SERPL-SCNC: 1.3 MMOL/L (ref 0.5–2)
DEPRECATED RDW RBC AUTO: 43.9 FL (ref 37–54)
E HISTOLYT DNA STL QL NAA+NON-PROBE: NOT DETECTED
EAEC PAA PLAS AGGR+AATA ST NAA+NON-PRB: DETECTED
EC STX1+STX2 GENES STL QL NAA+NON-PROBE: NOT DETECTED
EGFRCR SERPLBLD CKD-EPI 2021: 44.7 ML/MIN/1.73
EOSINOPHIL # BLD AUTO: 0.1 10*3/MM3 (ref 0–0.4)
EOSINOPHIL NFR BLD AUTO: 1.2 % (ref 0.3–6.2)
EPEC EAE GENE STL QL NAA+NON-PROBE: NOT DETECTED
ERYTHROCYTE [DISTWIDTH] IN BLOOD BY AUTOMATED COUNT: 12.9 % (ref 12.3–15.4)
ETEC LTA+ST1A+ST1B TOX ST NAA+NON-PROBE: NOT DETECTED
G LAMBLIA DNA STL QL NAA+NON-PROBE: NOT DETECTED
GEN 5 1HR TROPONIN T REFLEX: 33 NG/L
GLOBULIN UR ELPH-MCNC: 1.8 GM/DL
GLUCOSE SERPL-MCNC: 144 MG/DL (ref 65–99)
HCT VFR BLD AUTO: 40.4 % (ref 37.5–51)
HCT VFR BLD AUTO: 45.1 % (ref 37.5–51)
HGB BLD-MCNC: 13.6 G/DL (ref 13–17.7)
HGB BLD-MCNC: 15.1 G/DL (ref 13–17.7)
HOLD SPECIMEN: NORMAL
IMM GRANULOCYTES # BLD AUTO: 0.03 10*3/MM3 (ref 0–0.05)
IMM GRANULOCYTES NFR BLD AUTO: 0.3 % (ref 0–0.5)
LIPASE SERPL-CCNC: 119 U/L (ref 13–60)
LYMPHOCYTES # BLD AUTO: 0.78 10*3/MM3 (ref 0.7–3.1)
LYMPHOCYTES NFR BLD AUTO: 9 % (ref 19.6–45.3)
MCH RBC QN AUTO: 30.6 PG (ref 26.6–33)
MCHC RBC AUTO-ENTMCNC: 33.5 G/DL (ref 31.5–35.7)
MCV RBC AUTO: 91.5 FL (ref 79–97)
MONOCYTES # BLD AUTO: 0.57 10*3/MM3 (ref 0.1–0.9)
MONOCYTES NFR BLD AUTO: 6.6 % (ref 5–12)
NEUTROPHILS NFR BLD AUTO: 7.2 10*3/MM3 (ref 1.7–7)
NEUTROPHILS NFR BLD AUTO: 82.8 % (ref 42.7–76)
NOROVIRUS GI+II RNA STL QL NAA+NON-PROBE: NOT DETECTED
NRBC BLD AUTO-RTO: 0 /100 WBC (ref 0–0.2)
P SHIGELLOIDES DNA STL QL NAA+NON-PROBE: NOT DETECTED
PLATELET # BLD AUTO: 169 10*3/MM3 (ref 140–450)
PMV BLD AUTO: 8.9 FL (ref 6–12)
POTASSIUM SERPL-SCNC: 5.3 MMOL/L (ref 3.5–5.2)
POTASSIUM SERPL-SCNC: 5.3 MMOL/L (ref 3.5–5.2)
PROT SERPL-MCNC: 5.8 G/DL (ref 6–8.5)
QT INTERVAL: 426 MS
QTC INTERVAL: 432 MS
RBC # BLD AUTO: 4.93 10*6/MM3 (ref 4.14–5.8)
RVA RNA STL QL NAA+NON-PROBE: NOT DETECTED
S ENT+BONG DNA STL QL NAA+NON-PROBE: NOT DETECTED
SAPO I+II+IV+V RNA STL QL NAA+NON-PROBE: NOT DETECTED
SHIGELLA SP+EIEC IPAH ST NAA+NON-PROBE: NOT DETECTED
SODIUM SERPL-SCNC: 136 MMOL/L (ref 136–145)
TROPONIN T NUMERIC DELTA: 12 NG/L
TROPONIN T SERPL HS-MCNC: 21 NG/L
TROPONIN T SERPL HS-MCNC: 32 NG/L
V CHOL+PARA+VUL DNA STL QL NAA+NON-PROBE: NOT DETECTED
V CHOLERAE DNA STL QL NAA+NON-PROBE: NOT DETECTED
WBC NRBC COR # BLD AUTO: 8.69 10*3/MM3 (ref 3.4–10.8)
WHOLE BLOOD HOLD COAG: NORMAL
WHOLE BLOOD HOLD SPECIMEN: NORMAL
Y ENTEROCOL DNA STL QL NAA+NON-PROBE: NOT DETECTED

## 2025-07-21 PROCEDURE — G0378 HOSPITAL OBSERVATION PER HR: HCPCS

## 2025-07-21 PROCEDURE — 87507 IADNA-DNA/RNA PROBE TQ 12-25: CPT | Performed by: EMERGENCY MEDICINE

## 2025-07-21 PROCEDURE — 25810000003 SODIUM CHLORIDE 0.9 % SOLUTION: Performed by: EMERGENCY MEDICINE

## 2025-07-21 PROCEDURE — 99223 1ST HOSP IP/OBS HIGH 75: CPT | Performed by: HOSPITALIST

## 2025-07-21 PROCEDURE — 25810000003 SODIUM CHLORIDE 0.9 % SOLUTION: Performed by: HOSPITALIST

## 2025-07-21 PROCEDURE — 85014 HEMATOCRIT: CPT | Performed by: HOSPITALIST

## 2025-07-21 PROCEDURE — 36415 COLL VENOUS BLD VENIPUNCTURE: CPT

## 2025-07-21 PROCEDURE — 96361 HYDRATE IV INFUSION ADD-ON: CPT

## 2025-07-21 PROCEDURE — 74177 CT ABD & PELVIS W/CONTRAST: CPT

## 2025-07-21 PROCEDURE — 25510000001 IOPAMIDOL 61 % SOLUTION: Performed by: EMERGENCY MEDICINE

## 2025-07-21 PROCEDURE — 96360 HYDRATION IV INFUSION INIT: CPT

## 2025-07-21 PROCEDURE — 94761 N-INVAS EAR/PLS OXIMETRY MLT: CPT

## 2025-07-21 PROCEDURE — 94799 UNLISTED PULMONARY SVC/PX: CPT

## 2025-07-21 PROCEDURE — 85025 COMPLETE CBC W/AUTO DIFF WBC: CPT | Performed by: EMERGENCY MEDICINE

## 2025-07-21 PROCEDURE — 84132 ASSAY OF SERUM POTASSIUM: CPT | Performed by: HOSPITALIST

## 2025-07-21 PROCEDURE — 97165 OT EVAL LOW COMPLEX 30 MIN: CPT

## 2025-07-21 PROCEDURE — 83690 ASSAY OF LIPASE: CPT | Performed by: EMERGENCY MEDICINE

## 2025-07-21 PROCEDURE — 99285 EMERGENCY DEPT VISIT HI MDM: CPT

## 2025-07-21 PROCEDURE — 97161 PT EVAL LOW COMPLEX 20 MIN: CPT

## 2025-07-21 PROCEDURE — 80053 COMPREHEN METABOLIC PANEL: CPT | Performed by: EMERGENCY MEDICINE

## 2025-07-21 PROCEDURE — 94640 AIRWAY INHALATION TREATMENT: CPT

## 2025-07-21 PROCEDURE — 93005 ELECTROCARDIOGRAM TRACING: CPT | Performed by: EMERGENCY MEDICINE

## 2025-07-21 PROCEDURE — 85018 HEMOGLOBIN: CPT | Performed by: HOSPITALIST

## 2025-07-21 PROCEDURE — 84484 ASSAY OF TROPONIN QUANT: CPT | Performed by: EMERGENCY MEDICINE

## 2025-07-21 PROCEDURE — 83605 ASSAY OF LACTIC ACID: CPT | Performed by: EMERGENCY MEDICINE

## 2025-07-21 RX ORDER — SODIUM CHLORIDE 9 MG/ML
10 INJECTION, SOLUTION INTRAMUSCULAR; INTRAVENOUS; SUBCUTANEOUS AS NEEDED
Status: DISCONTINUED | OUTPATIENT
Start: 2025-07-21 | End: 2025-07-23 | Stop reason: HOSPADM

## 2025-07-21 RX ORDER — PROCHLORPERAZINE EDISYLATE 5 MG/ML
5 INJECTION INTRAMUSCULAR; INTRAVENOUS EVERY 6 HOURS PRN
Status: DISCONTINUED | OUTPATIENT
Start: 2025-07-21 | End: 2025-07-23 | Stop reason: HOSPADM

## 2025-07-21 RX ORDER — LEVOTHYROXINE SODIUM 75 UG/1
75 TABLET ORAL
Status: DISCONTINUED | OUTPATIENT
Start: 2025-07-22 | End: 2025-07-23 | Stop reason: HOSPADM

## 2025-07-21 RX ORDER — ATORVASTATIN CALCIUM 40 MG/1
80 TABLET, FILM COATED ORAL NIGHTLY
Status: DISCONTINUED | OUTPATIENT
Start: 2025-07-21 | End: 2025-07-23 | Stop reason: HOSPADM

## 2025-07-21 RX ORDER — SODIUM CHLORIDE 9 MG/ML
100 INJECTION, SOLUTION INTRAVENOUS CONTINUOUS
Status: DISCONTINUED | OUTPATIENT
Start: 2025-07-21 | End: 2025-07-22

## 2025-07-21 RX ORDER — ASPIRIN 81 MG/1
81 TABLET ORAL DAILY
Status: DISCONTINUED | OUTPATIENT
Start: 2025-07-21 | End: 2025-07-21

## 2025-07-21 RX ORDER — ALBUTEROL SULFATE 0.83 MG/ML
2.5 SOLUTION RESPIRATORY (INHALATION) EVERY 4 HOURS PRN
Status: DISCONTINUED | OUTPATIENT
Start: 2025-07-21 | End: 2025-07-23 | Stop reason: HOSPADM

## 2025-07-21 RX ORDER — PANTOPRAZOLE SODIUM 40 MG/1
40 TABLET, DELAYED RELEASE ORAL
Status: DISCONTINUED | OUTPATIENT
Start: 2025-07-22 | End: 2025-07-23 | Stop reason: HOSPADM

## 2025-07-21 RX ORDER — AZITHROMYCIN 250 MG/1
500 TABLET, FILM COATED ORAL ONCE
Status: COMPLETED | OUTPATIENT
Start: 2025-07-21 | End: 2025-07-21

## 2025-07-21 RX ORDER — IOPAMIDOL 612 MG/ML
85 INJECTION, SOLUTION INTRAVASCULAR
Status: COMPLETED | OUTPATIENT
Start: 2025-07-21 | End: 2025-07-21

## 2025-07-21 RX ORDER — PROCHLORPERAZINE MALEATE 5 MG/1
5 TABLET ORAL EVERY 6 HOURS PRN
Status: DISCONTINUED | OUTPATIENT
Start: 2025-07-21 | End: 2025-07-23 | Stop reason: HOSPADM

## 2025-07-21 RX ORDER — ONDANSETRON 2 MG/ML
4 INJECTION INTRAMUSCULAR; INTRAVENOUS EVERY 6 HOURS PRN
Status: DISCONTINUED | OUTPATIENT
Start: 2025-07-21 | End: 2025-07-23 | Stop reason: HOSPADM

## 2025-07-21 RX ORDER — CLOPIDOGREL BISULFATE 75 MG/1
75 TABLET ORAL DAILY
Status: DISCONTINUED | OUTPATIENT
Start: 2025-07-21 | End: 2025-07-23 | Stop reason: HOSPADM

## 2025-07-21 RX ORDER — ARFORMOTEROL TARTRATE 15 UG/2ML
15 SOLUTION RESPIRATORY (INHALATION)
Status: DISCONTINUED | OUTPATIENT
Start: 2025-07-21 | End: 2025-07-23 | Stop reason: HOSPADM

## 2025-07-21 RX ORDER — ALBUTEROL SULFATE 90 UG/1
2 INHALANT RESPIRATORY (INHALATION) EVERY 4 HOURS PRN
Status: DISCONTINUED | OUTPATIENT
Start: 2025-07-21 | End: 2025-07-21

## 2025-07-21 RX ORDER — CALCIUM CARBONATE 500 MG/1
2 TABLET, CHEWABLE ORAL 2 TIMES DAILY PRN
Status: DISCONTINUED | OUTPATIENT
Start: 2025-07-21 | End: 2025-07-23 | Stop reason: HOSPADM

## 2025-07-21 RX ORDER — SACUBITRIL AND VALSARTAN 24; 26 MG/1; MG/1
1 TABLET, FILM COATED ORAL EVERY 12 HOURS SCHEDULED
Status: DISCONTINUED | OUTPATIENT
Start: 2025-07-21 | End: 2025-07-23 | Stop reason: HOSPADM

## 2025-07-21 RX ORDER — PROCHLORPERAZINE 25 MG
25 SUPPOSITORY, RECTAL RECTAL EVERY 12 HOURS PRN
Status: DISCONTINUED | OUTPATIENT
Start: 2025-07-21 | End: 2025-07-23 | Stop reason: HOSPADM

## 2025-07-21 RX ORDER — TRAZODONE HYDROCHLORIDE 50 MG/1
50 TABLET ORAL NIGHTLY
Status: DISCONTINUED | OUTPATIENT
Start: 2025-07-21 | End: 2025-07-23 | Stop reason: HOSPADM

## 2025-07-21 RX ADMIN — ATORVASTATIN CALCIUM 80 MG: 40 TABLET, FILM COATED ORAL at 20:08

## 2025-07-21 RX ADMIN — IOPAMIDOL 85 ML: 612 INJECTION, SOLUTION INTRAVENOUS at 06:52

## 2025-07-21 RX ADMIN — SODIUM CHLORIDE 100 ML/HR: 9 INJECTION, SOLUTION INTRAVENOUS at 15:42

## 2025-07-21 RX ADMIN — AZITHROMYCIN DIHYDRATE 500 MG: 250 TABLET ORAL at 11:13

## 2025-07-21 RX ADMIN — TRAZODONE HYDROCHLORIDE 50 MG: 50 TABLET ORAL at 20:08

## 2025-07-21 RX ADMIN — CLOPIDOGREL BISULFATE 75 MG: 75 TABLET, FILM COATED ORAL at 15:40

## 2025-07-21 RX ADMIN — SODIUM CHLORIDE 100 ML/HR: 9 INJECTION, SOLUTION INTRAVENOUS at 22:25

## 2025-07-21 RX ADMIN — ARFORMOTEROL TARTRATE 15 MCG: 15 SOLUTION RESPIRATORY (INHALATION) at 20:32

## 2025-07-21 RX ADMIN — SODIUM CHLORIDE 2000 ML: 9 INJECTION, SOLUTION INTRAVENOUS at 07:11

## 2025-07-21 RX ADMIN — CALCIUM CARBONATE (ANTACID) CHEW TAB 500 MG 2 TABLET: 500 CHEW TAB at 21:46

## 2025-07-21 RX ADMIN — SODIUM ZIRCONIUM CYCLOSILICATE 10 G: 10 POWDER, FOR SUSPENSION ORAL at 15:41

## 2025-07-21 NOTE — THERAPY DISCHARGE NOTE
Patient Name: Gee Whitney  : 1950    MRN: [id number]                              Today's Date: 2025       Admit Date: 2025    Visit Dx:     ICD-10-CM ICD-9-CM   1. E. coli gastroenteritis  A04.4 008.00   2. Syncope and collapse  R55 780.2     Patient Active Problem List   Diagnosis    Coronary artery disease    Essential hypertension    Dyslipidemia    Hypothyroidism    Gastroenteritis    Leukocytosis    Chronic cough    Chronic rhinitis    Ischemic cardiomyopathy    Heart failure    Progressive angina    CAD (coronary artery disease)    HFrEF (heart failure with reduced ejection fraction)    Diarrhea     Past Medical History:   Diagnosis Date    Chronic kidney disease     Congenital heart disease     Coronary artery disease     Disease of thyroid gland     GERD (gastroesophageal reflux disease)     Have occually but not in regulat basis    Heart failure 2025    Hyperlipidemia     Hypertension     Myocardial infarction      Past Surgical History:   Procedure Laterality Date    BRONCHOSCOPY      CARDIAC CATHETERIZATION      CARDIAC CATHETERIZATION N/A 6/10/2025    Procedure: Left Heart Cath - Right radial access;  Surgeon: Shaun Joseph MD;  Location:  [facility];  Service: Cardiovascular;  Laterality: N/A;    CARDIAC CATHETERIZATION N/A 6/10/2025    Procedure: Functional Flow Smilax;  Surgeon: Shaun Joseph MD;  Location:  [facility] CATH INVASIVE LOCATION;  Service: Cardiovascular;  Laterality: N/A;    CARDIAC CATHETERIZATION N/A 6/10/2025    Procedure: Stent KARSTEN coronary;  Surgeon: Shaun Joseph MD;  Location:  [facility] CATH INVASIVE LOCATION;  Service: Cardiovascular;  Laterality: N/A;    HERNIA REPAIR      TENDON RELEASE      Left thumb       General Information       Row Name 25 1453          OT Time and Intention    Document Type discharge evaluation/summary  -JY     Mode of Treatment occupational therapy  -JY       Row Name 25 1457           General Information    Patient Profile Reviewed yes  -JY     Prior Level of Function independent:;all household mobility;community mobility;gait;transfer;bed mobility;ADL's;feeding;grooming;dressing;bathing;driving;min assist:;home management;cooking;cleaning  shared responsibility w/ spouse in home mgmt, cooking and cleaning; I in all ADLs; no AD used at home; actively driving; 1 fall off toilet w/ increased GI s/s  -JY     Existing Precautions/Restrictions orthostatic hypotension;cardiac;other (see comments)  monitor BP w/ recent syncope , hx CAD s/p PCI/KARSTEN (6/10/25)  -JY     Barriers to Rehab none identified  -JY       Row Name 07/21/25 1453          Occupational Profile    Environmental Supports and Barriers (Occupational Profile) walk in shower w/ seat (does not sit at baseline), elevated toilet; DME: no AD used at baseline  -JY     Patient Goals (Occupational Profile) to return to PLOF  -JY       Row Name 07/21/25 1453          Living Environment    Current Living Arrangements home  -JY     People in Home spouse  spouse able to assist as needed  -JY       Row Name 07/21/25 1453          Home Main Entrance    Number of Stairs, Main Entrance one  -JY       Row Name 07/21/25 1453          Stairs Within Home, Primary    Number of Stairs, Within Home, Primary none  -JY       Row Name 07/21/25 1453          Cognition    Orientation Status (Cognition) oriented x 4  -JY       Row Name 07/21/25 1453          Safety Issues/Impairments Affecting Functional Mobility    Impairments Affecting Function (Mobility) endurance/activity tolerance  -JY     Comment, Safety Issues/Impairments (Mobility) pt alert and able to follow commands; presents with orthostatic hypotension yet denies any dizziness or feeling LH w/ position changes; RN aware  -JY               User Key  (r) = Recorded By, (t) = Taken By, (c) = Cosigned By      Initials Name Provider Type    Wendi Stein OT Occupational Therapist                      Mobility/ADL's       Row Name 07/21/25 1458          Bed Mobility    Bed Mobility supine-sit;scooting/bridging  -JY     Scooting/Bridging [place] (Bed Mobility) modified independence  -JY     Supine-Sit [place] (Bed Mobility) modified independence  -JY     Assistive Device (Bed Mobility) head of bed elevated  -JY     Comment, (Bed Mobility) HOB elevated and pt has option for elevation at home, anticipate pt could complete without elevation, otherwise demonstrated good hand placement and seq to reach EOB and scoot forward for hip symmetry  -JY       Row Name 07/21/25 1458          Transfers    Transfers sit-stand transfer;stand-sit transfer  -JY     Comment, (Transfers) pt demonstrated optimal hand placement and seq to stand and sit with control, denied dizziness in standing, BP decreased  -SCOTT       Row Name 07/21/25 1458          Sit-Stand Transfer    Sit-Stand [place] (Transfers) standby assist  -JY     Assistive Device (Sit-Stand Transfers) other (see comments)  no AD used  -Style for HireY       Row Name 07/21/25 1458          Stand-Sit Transfer    Stand-Sit [place] (Transfers) standby assist  -JY     Assistive Device (Stand-Sit Transfers) other (see comments)  no AD used  -Style for HireY       Row Name 07/21/25 1458          Functional Mobility    Functional Mobility- Ind. Level contact guard assist  -JY     Functional Mobility- Device other (see comments)  no AD used  -JY     Functional Mobility- Comment defr to PT for specifics however during in room ADL related mobility pt req'd CGA for safety purposes with decrease in BP with change in position, pt denied dizziness yet CGA provided via A to ensure safety  -ROD     Patient was able to Ambulate yes  -SCOTT       Row Name 07/21/25 1458          Activities of Daily Living    BADL Assessment/Intervention upper body dressing;lower body dressing;grooming  -Style for [employer]ORALES       Row Name 07/21/25 1458          Upper Body Dressing Assessment/Training    [place] Level (Upper Body  Dressing) other (see comments)  -J     Position (Upper Body Dressing) edge of bed sitting  -     Comment, (Upper Body Dressing) based on skill set w/ ROM and strength at Es anticipate I in UBD, denied exchange of gown  -[facility] Name 07/21/25 1458          Lower Body Dressing Assessment/Training    [place] Level (Lower Body Dressing) doff;don;socks;independent  -J     Position (Lower Body Dressing) edge of bed sitting  -       Row Name 07/21/25 1458          Grooming Assessment/Training    [place] Level (Grooming) wash face, hands;set up  -J     Position (Grooming) supported sitting  -               User Key  (r) = Recorded By, (t) = Taken By, (c) = Cosigned By      Initials Name Provider Type    Wendi Stein OT Occupational Therapist                   Obj/Interventions       [facility] Name 07/21/25 1504          Sensory Assessment (Somatosensory)    Sensory Assessment (Somatosensory) bilateral UE;sensation intact  -     Bilateral UE Sensory Assessment general sensation;light touch awareness;intact  -     Sensory Assessment denies any numbness or tingling and able to recognize LT stimuli as intact and symmetrical at [facility]  -[facility] Name 07/21/25 1504          Vision Assessment/Intervention    Vision Assessment Comment no acute changes to vision  -JY       Row Name 07/21/25 1504          Range of Motion Comprehensive    General Range of Motion bilateral upper extremity ROM WFL  -JY       Row Name 07/21/25 1504          Strength Comprehensive (MMT)    Comment, General Manual Muscle Testing (MMT) Assessment per MMT pt demonstrated 5/5 MMS  -JY       Row Name 07/21/25 1504          Motor Skills    Motor Skills coordination;functional endurance  -     Coordination bilateral;upper extremity;finger to nose;other (see comments)  finger thumb opposition  -     Functional Endurance demonstrated some fatigue with more dynamic demands with recent GI s/s, charted volume depletion yet  cognizant of limits  -JY       Row Name 07/21/25 1504          Balance    Balance Assessment sitting static balance;sitting dynamic balance;standing static balance;standing dynamic balance  -JY     Static Sitting Balance independent  -JY     Dynamic Sitting Balance independent  -JY     Position, Sitting Balance unsupported;sitting edge of bed  -JY     Static Standing Balance standby assist  -JY     Dynamic Standing Balance contact guard  -JY     Position/Device Used, Standing Balance supported;other (see comments)  no AD used, HHA provided for safety  -JY     Balance Interventions sitting;standing;static;dynamic;sit to stand;supported;occupation based/functional task  -JY     Comment, Balance no overt LOB during seated or standing tasks  -JY               User Key  (r) = Recorded By, (t) = Taken By, (c) = Cosigned By      Initials Name Provider Type    Wendi Stein, ELDA Occupational Therapist                   Goals/Plan    No documentation.                  Clinical Impression       Row Name 07/21/25 1506          Pain Assessment    Pretreatment Pain Rating 0/10 - no pain  -JY     Posttreatment Pain Rating 0/10 - no pain  -JY     Response to Pain Interventions no change per patient report  -JY     Pre/Posttreatment Pain Comment denies any pain and tolerated all OT interventions  -JY       Row Name 07/21/25 1506          Plan of Care Review    Plan of Care Reviewed With patient;spouse  -JY     Progress improving  -JY     Outcome Evaluation OT evaluation completed. Pt presents with what appears to be near baseline occupational performance with ADLs and related t/fs and mobility. Pt demonstrated I in dressing, g/h and reported I w/ toileting prior to OT arrival. Pt demonstated I in bed mobilty with only HOB elevated and SBA in fxl t/fs and CGA in in room ADL related mobility only for safety at this time due to documented decrease in BP with position changes. Pt denies any dizziness or feeling LH and reports  similar happenings at cardiac rehab at [facility]. Pt was provided CGA for ensured security in room yet no LOB noted. Pt would benefit from PT services to address underlying impairments yet no further IPOT POC warranted at this time. Recommend home w/ A at d/c when medically ready and resume cardiac rehab if appropriate.  -SCOTT       Row Name 07/21/25 1506          Therapy Assessment/Plan (OT)    Patient/Family Therapy Goal Statement (OT) to return to [facility]  -JY     Rehab Potential (OT) good  -JY     Criteria for Skilled Therapeutic Interventions Met (OT) yes;skilled treatment is necessary  -JY     Therapy Frequency (OT) daily  -JY     Predicted Duration of Therapy Intervention (OT) evaluation only  -SCOTT       Row Name 07/21/25 1506          Therapy Plan Review/Discharge Plan (OT)    Anticipated Discharge Disposition (OT) home with assist;other (see comments)  resume cardiac rehab if appropriate  -JY       Row Name 07/21/25 1506          Vital Signs    Pre Systolic BP Rehab 112  -JY     Pre Treatment Diastolic BP 56  -JY     Intra Systolic BP Rehab 97  -JY     Intra Treatment Diastolic BP 54  -JY     Post Systolic BP Rehab 80  -JY     Post Treatment Diastolic BP 49  123/49 upon return to sitting in recliner  -JY     Pretreatment Heart Rate (beats/min) 83  -JY     Posttreatment Heart Rate (beats/min) 80  -JY     O2 Delivery Pre Treatment room air  -JY     O2 Delivery Intra Treatment room air  -JY     O2 Delivery Post Treatment room air  -JY     Pre Patient Position Supine  -JY     Intra Patient Position Sitting  -JY     Post Patient Position Standing  -JY       Row Name 07/21/25 1506          Positioning and Restraints    Pre-Treatment Position in bed  -JY     Post Treatment Position chair  -JY     In Chair notified nsg;reclined;call light within reach;encouraged to call for assist;with family/caregiver;legs elevated  exit alarm not on upon OT arrival, no change at OT exit  -JY               User Key  (r) = Recorded By, (t) =  Taken By, (c) = Cosigned By      Initials Name Provider Type    Wendi Stein OT Occupational Therapist                   Outcome Measures       Row Name 07/21/25 1513          How much help from another is currently needed...    Putting on and taking off regular lower body clothing? 4  -JY     Bathing (including washing, rinsing, and drying) 4  -JY     Toileting (which includes using toilet bed pan or urinal) 4  -JY     Putting on and taking off regular upper body clothing 4  -JY     Taking care of personal grooming (such as brushing teeth) 4  -JY     Eating meals 4  -JY     AM-PAC 6 Clicks Score (OT) 24  -JY       Row Name 07/21/25 1327          How much help from another person do you currently need...    Turning from your back to your side while in flat bed without using bedrails? 4  -CT     Moving from lying on back to sitting on the side of a flat bed without bedrails? 4  -CT     Moving to and from a bed to a chair (including a wheelchair)? 4  -CT     Standing up from a chair using your arms (e.g., wheelchair, bedside chair)? 4  -CT     Climbing 3-5 steps with a railing? 4  -CT     To walk in hospital room? 4  -CT     AM-PAC 6 Clicks Score (PT) 24  -CT       Row Name 07/21/25 1513          Functional Assessment    Outcome Measure Options AM-PAC 6 Clicks Daily Activity (OT)  -               User Key  (r) = Recorded By, (t) = Taken By, (c) = Cosigned By      Initials Name Provider Type    Wendi Stein OT Occupational Therapist    Nohemi Schultz RN Registered Nurse                    Occupational Therapy Education       Title: PT OT SLP Therapies (In Progress)       Topic: Occupational Therapy (In Progress)       Point: ADL training (In Progress)       Learning Progress Summary            Patient Acceptance, E,D, NR by SCOTT at 7/21/2025 1409   Family Acceptance, E,D, NR by SCOTT at 7/21/2025 1409                      Point: Precautions (In Progress)       Learning Progress Summary            Patient  Acceptance, E,D, NR by SCOTT at 7/21/2025 1409   Family Acceptance, E,D, NR by SCOTT at 7/21/2025 1409                      Point: Body mechanics (In Progress)       Learning Progress Summary            Patient Acceptance, E,D, NR by SCOTT at 7/21/2025 1409   Family Acceptance, E,D, NR by SCOTT at 7/21/2025 1409                                      User Key       Initials Effective Dates Name Provider Type Discipline    SCOTT 06/16/21 -  Wendi Borges OT Occupational Therapist OT                  OT Recommendation and Plan  Therapy Frequency (OT): daily  Plan of Care Review  Plan of Care Reviewed With: patient, spouse  Progress: improving  Outcome Evaluation: OT evaluation completed. Pt presents with what appears to be near baseline occupational performance with ADLs and related t/fs and mobility. Pt demonstrated I in dressing, g/h and reported I w/ toileting prior to OT arrival. Pt demonstated I in bed mobilty with only HOB elevated and SBA in fxl t/fs and CGA in in room ADL related mobility only for safety at this time due to documented decrease in BP with position changes. Pt denies any dizziness or feeling LH and reports similar happenings at cardiac rehab at [facility]. Pt was provided CGA for ensured security in room yet no LOB noted. Pt would benefit from PT services to address underlying impairments yet no further IPOT POC warranted at this time. Recommend home w/ A at d/c when medically ready and resume cardiac rehab if appropriate.     Time Calculation:   Evaluation Complexity (OT)  Review Occupational Profile/Medical/Therapy History Complexity: brief/low complexity  Assessment, Occupational Performance/Identification of Deficit Complexity: 1-3 performance deficits  Clinical Decision Making Complexity (OT): problem focused assessment/low complexity  Overall Complexity of Evaluation (OT): low complexity     Time Calculation- OT       Row Name 07/21/25 1514             Time Calculation- OT    OT Start Time 1409  -JY      OT  Received On 07/21/25  -JY         Untimed Charges    OT Eval/Re-eval Minutes 50  -JY         Total Minutes    Untimed Charges Total Minutes 50  -JY       Total Minutes 50  -JY                User Key  (r) = Recorded By, (t) = Taken By, (c) = Cosigned By      Initials Name Provider Type    Wendi Stein OT Occupational Therapist                  Therapy Charges for Today       Code Description Service Date Service Provider Modifiers Qty    [id number] HC OT EVAL LOW COMPLEXITY 4 7/21/2025 Wendi Borges OT GO 1                 eWndi Borges OT  7/21/2025

## 2025-07-21 NOTE — PROGRESS NOTES
Patient called to cancel todays cardiac rehab session due to being in ED for GI related issues. Patient plans to return on 7/25/25.

## 2025-07-21 NOTE — ED PROVIDER NOTES
Subjective   History of Present Illness  Mr. Whitney presents via EMS with diarrhea, abdominal pain, syncope.  Began having diarrhea yesterday.  Was able to eat dinner however throughout the night the diarrhea returns.  Is accompanied by diffuse abdominal pain.  No blood in his diarrhea.  Has had 2 syncopal episodes which prompted the call to EMS.  Has had no vomiting.  Has had no prior abdominal surgeries.  No one else at home has been sick.  I saw him a month ago with a syncopal episode, was admitted and had stent placed in his LAD.      Review of Systems    Past Medical History:   Diagnosis Date    Chronic kidney disease     Congenital heart disease     Coronary artery disease     Disease of thyroid gland     GERD (gastroesophageal reflux disease)     Have occually but not in regulat basis    Heart failure 2025    Hyperlipidemia     Hypertension     Myocardial infarction        No Known Allergies    Past Surgical History:   Procedure Laterality Date    BRONCHOSCOPY      CARDIAC CATHETERIZATION      CARDIAC CATHETERIZATION N/A 6/10/2025    Procedure: Left Heart Cath - Right radial access;  Surgeon: Shaun Joseph MD;  Location:  [facility] CATH INVASIVE LOCATION;  Service: Cardiovascular;  Laterality: N/A;    CARDIAC CATHETERIZATION N/A 6/10/2025    Procedure: Functional Flow [place];  Surgeon: Shaun Joseph MD;  Location:  [facility] CATH INVASIVE LOCATION;  Service: Cardiovascular;  Laterality: N/A;    CARDIAC CATHETERIZATION N/A 6/10/2025    Procedure: Stent KARSTEN coronary;  Surgeon: Shaun Joseph MD;  Location:  [facility] CATH INVASIVE LOCATION;  Service: Cardiovascular;  Laterality: N/A;    HERNIA REPAIR      TENDON RELEASE      Left thumb        Family History   Problem Relation Age of Onset    Heart failure Mother     Heart attack Mother         Had triple bypass     Heart disease Mother         Had triple bypass     Other Father 28         in car accident    Heart attack Sister      Mitral valve prolapse Sister     Asthma Brother     Stroke Brother     Hypertension Child        Social History     Socioeconomic History    Marital status:     Number of children: 2   Tobacco Use    Smoking status: Former     Current packs/day: 0.00     Average packs/day: 1 pack/day for 10.0 years (10.0 ttl pk-yrs)     Types: Cigarettes     Start date: 1967     Quit date: 1977     Years since quittin.5     Passive exposure: Past    Smokeless tobacco: Never    Tobacco comments:     Stopped the day i had heart attack   Vaping Use    Vaping status: Never Used   Substance and Sexual Activity    Alcohol use: Yes     Comment: Maybe couple drinks a month    Drug use: No    Sexual activity: Not Currently     Partners: Female     Birth control/protection: None           Objective   Physical Exam  Vitals (Systolic blood pressure is 85 during my exam) and nursing note reviewed.   Constitutional:       General: He is not in acute distress.     Appearance: Normal appearance.   HENT:      Head: Normocephalic and atraumatic.      Nose: Nose normal. No congestion or rhinorrhea.   Eyes:      General: No scleral icterus.     Conjunctiva/sclera: Conjunctivae normal.   Neck:      Comments: No JVD   Cardiovascular:      Rate and Rhythm: Normal rate and regular rhythm.      Heart sounds: No murmur heard.     No friction rub.   Pulmonary:      Effort: Pulmonary effort is normal.      Breath sounds: Normal breath sounds. No wheezing or rales.   Abdominal:      General: Bowel sounds are normal.      Palpations: Abdomen is soft.      Tenderness: There is abdominal tenderness. There is no guarding or rebound.      Comments: Mild diffuse tenderness.  Abdomen is soft with active bowel sounds   Musculoskeletal:         General: No tenderness.      Cervical back: Normal range of motion and neck supple.      Right lower leg: No edema.      Left lower leg: No edema.   Skin:     General: Skin is warm and dry.      Coloration:  Skin is not pale.      Findings: No erythema.   Neurological:      General: No focal deficit present.      Mental Status: He is alert.      Motor: No weakness.      Coordination: Coordination normal.   Psychiatric:         Mood and Affect: Mood normal.         Behavior: Behavior normal.         Thought Content: Thought content normal.         Procedures           ED Course  ED Course as of 07/21/25 1147   Mon Jul 21, 2025   0641 Ordered and interpreted labs, vigorous IV fluids, CT scan abdomen and pelvis.  Reviewed and interpreted prior records as reflected above [DT]   1011 Awaiting GI panel.  Troponin went up a little bit although is low overall.  Will obtain third troponin. [DT]   1034 PCR panel shows enteroaggregate of E. coli.  He has had 2 syncopal episodes from this and would therefore classify it as severe.  Will treat with Zithromax. [DT]   1035 Sitting on the commode, having cramping, systolic blood pressure 105 after 2 L fluids.  Will seek admission. [DT]      ED Course User Index  [DT] Db Khan MD                                                       Medical Decision Making  Ordered and interpreted multiple labs.  Reviewed and interpreted prior records regarding recent cardiac stenting.  Gave 2 rounds of IV fluids and had reevaluation and discussion.    Problems Addressed:  E. coli gastroenteritis: complicated acute illness or injury that poses a threat to life or bodily functions  Syncope and collapse: complicated acute illness or injury that poses a threat to life or bodily functions    Amount and/or Complexity of Data Reviewed  External Data Reviewed: ECG and notes.  Labs: ordered. Decision-making details documented in ED Course.  Radiology: ordered. Decision-making details documented in ED Course.  ECG/medicine tests: ordered. Decision-making details documented in ED Course.    Risk  Prescription drug management.  Decision regarding hospitalization.        Final diagnoses:   E. coli  gastroenteritis   Syncope and collapse       ED Disposition  ED Disposition       ED Disposition   Decision to Admit    Condition   --    Comment   Level of Care: Telemetry [5]   Diagnosis: Diarrhea [787.91.ICD-9-CM]                 No follow-up provider specified.       Medication List      No changes were made to your prescriptions during this visit.            Db Khan MD  07/21/25 1145

## 2025-07-21 NOTE — PLAN OF CARE
Problem: Adult Inpatient Plan of Care  Goal: Plan of Care Review  Outcome: Progressing  Goal: Patient-Specific Goal (Individualized)  Outcome: Progressing  Goal: Absence of Hospital-Acquired Illness or Injury  Outcome: Progressing  Intervention: Identify and Manage Fall Risk  Recent Flowsheet Documentation  Taken 7/21/2025 1600 by Nohemi Zimmerman RN  Safety Promotion/Fall Prevention:   safety round/check completed   activity supervised   assistive device/personal items within reach   clutter free environment maintained   fall prevention program maintained  Taken 7/21/2025 1400 by Nohemi Zimmerman RN  Safety Promotion/Fall Prevention:   safety round/check completed   clutter free environment maintained   activity supervised  Intervention: Prevent Skin Injury  Recent Flowsheet Documentation  Taken 7/21/2025 1600 by Nohemi Zimmerman RN  Body Position: position changed independently  Skin Protection: transparent dressing maintained  Taken 7/21/2025 1400 by Nohemi Zimmerman RN  Body Position: position changed independently  Intervention: Prevent Infection  Recent Flowsheet Documentation  Taken 7/21/2025 1600 by Nohemi Zimmerman RN  Infection Prevention:   rest/sleep promoted   single patient room provided   hand hygiene promoted   personal protective equipment utilized  Taken 7/21/2025 1400 by Nohemi Zimmerman RN  Infection Prevention:   hand hygiene promoted   personal protective equipment utilized   rest/sleep promoted   single patient room provided   equipment surfaces disinfected  Goal: Optimal Comfort and Wellbeing  Outcome: Progressing  Intervention: Provide Person-Centered Care  Recent Flowsheet Documentation  Taken 7/21/2025 1600 by Nohemi Zimmerman RN  Trust Relationship/Rapport:   care explained   choices provided   questions answered   reassurance provided   thoughts/feelings acknowledged  Goal: Readiness for Transition of Care  Outcome: Progressing  Intervention: Mutually Develop Transition Plan  Recent  Flowsheet Documentation  Taken 7/21/2025 1330 by oNhemi Zimmerman, RN  Transportation Anticipated: family or friend will provide  Patient/Family Anticipated Services at Transition: none  Patient/Family Anticipates Transition to: home with family  Taken 7/21/2025 1329 by Nohemi Zimmerman, RN  Equipment Currently Used at Home: none     Problem: Comorbidity Management  Goal: Blood Pressure in Desired Range  Outcome: Progressing  Intervention: Maintain Blood Pressure Management  Recent Flowsheet Documentation  Taken 7/21/2025 1600 by Nohemi Zimmerman, RN  Medication Review/Management: medications reviewed     Problem: Infection  Goal: Absence of Infection Signs and Symptoms  Outcome: Progressing   Goal Outcome Evaluation:

## 2025-07-21 NOTE — PLAN OF CARE
Goal Outcome Evaluation:  Plan of Care Reviewed With: patient, spouse        Progress: improving  Outcome Evaluation: OT evaluation completed. Pt presents with what appears to be near baseline occupational performance with ADLs and related t/fs and mobility. Pt demonstrated I in dressing, g/h and reported I w/ toileting prior to OT arrival. Pt demonstated I in bed mobilty with only HOB elevated and SBA in fxl t/fs and CGA in in room ADL related mobility only for safety at this time due to documented decrease in BP with position changes. Pt denies any dizziness or feeling LH and reports similar happenings at cardiac rehab at [facility]. Pt was provided CGA for ensured security in room yet no LOB noted. Pt would benefit from PT services to address underlying impairments yet no further IPOT POC warranted at this time. Recommend home w/ A at d/c when medically ready and resume cardiac rehab if appropriate.    Anticipated Discharge Disposition (OT): home with assist, other (see comments) (resume cardiac rehab if appropriate)

## 2025-07-21 NOTE — ED NOTES
Gee Whitney    Nursing Report ED to Floor:  Mental status: AOx4  Ambulatory status: assist x1  Oxygen Therapy:  RA  Cardiac Rhythm: NSR  Admitted from: home  Safety Concerns:  none  Precautions: fall risk, e. coli  Social Issues: none  ED Room #:  5    ED Nurse Phone Extension - [phone] or may call [phone].      HPI:   Chief Complaint   Patient presents with    Nausea    Vomiting    Diarrhea       Past Medical History:  Past Medical History:   Diagnosis Date    Chronic kidney disease     Congenital heart disease 1977    Coronary artery disease 1977    Disease of thyroid gland     GERD (gastroesophageal reflux disease)     Have occually but not in regulat basis    Heart failure 05/09/2025    Hyperlipidemia     Hypertension     Myocardial infarction         Past Surgical History:  Past Surgical History:   Procedure Laterality Date    BRONCHOSCOPY      CARDIAC CATHETERIZATION  1977    CARDIAC CATHETERIZATION N/A 6/10/2025    Procedure: Left Heart Cath - Right radial access;  Surgeon: Shaun Joseph MD;  Location:  [facility]ATH INVAS[facility] LOCATION;  Service: Cardiovascular;  Laterality: N/A;    CARDIAC CATHETERIZATION N/A 6/10/2025    Procedure: Functional Flow [place];  Surgeon: Shaun Joseph MD;  Location:  [facility] INVAS[facility] LOCATION;  Service: Cardiovascular;  Laterality: N/A;    CARDIAC CATHETERIZATION N/A 6/10/2025    Procedure: Stent KARSTEN coronary;  Surgeon: Shaun Joseph MD;  Location:  [facility] CATH INVASIVE LOCATION;  Service: Cardiovascular;  Laterality: N/A;    HERNIA REPAIR      TENDON RELEASE      Left thumb         Admitting Doctor:   Chirag Bae MD    Consulting Provider(s):  Consults       No orders found from 6/22/2025 to 7/22/2025.             Admitting Diagnosis:   The primary encounter diagnosis was E. coli gastroenteritis. A diagnosis of Syncope and collapse was also pertinent to this visit.    Most Recent Vitals:   Vitals:    07/21/25 1000 07/21/25 1030 07/21/25 1100 07/21/25 1101   BP:  105/52 104/56  110/57   Patient Position:       Pulse: 64 69 70 69   Resp:    16   Temp:       SpO2: 94% 93% 91% 91%   Weight:       Height:           Active LDAs/IV Access:   Lines, Drains & Airways       Active LDAs       Name Placement date Placement time Site Days    Peripheral IV 07/21/25 0602 20 G Left;Posterior Hand 07/21/25  0602  Hand  less than 1    Peripheral IV 07/21/25 0621 20 G Posterior;Right Hand 07/21/25  0621  Hand  less than 1                    Labs (abnormal labs have a star):   Labs Reviewed   GASTROINTESTINAL PANEL, PCR (PREFERRED) DOES NOT INCLUDE CDIFF - Abnormal; Notable for the following components:       Result Value    Enteroaggregative E. coli (EAEC) Detected (*)     All other components within normal limits   COMPREHENSIVE METABOLIC PANEL - Abnormal; Notable for the following components:    Glucose 144 (*)     BUN 32.6 (*)     Creatinine 1.59 (*)     Potassium 5.3 (*)     CO2 19.7 (*)     Total Protein 5.8 (*)     eGFR 44.7 (*)     All other components within normal limits    Narrative:     GFR Categories in Chronic Kidney Disease (CKD)              GFR Category          GFR (mL/min/1.73)    Interpretation  G1                    90 or greater        Normal or high (1)  G2                    60-89                Mild decrease (1)  G3a                   45-59                Mild to moderate decrease  G3b                   30-44                Moderate to severe decrease  G4                    15-29                Severe decrease  G5                    14 or less           Kidney failure    (1)In the absence of evidence of kidney disease, neither GFR category G1 or G2 fulfill the criteria for CKD.    eGFR calculation 2021 CKD-EPI creatinine equation, which does not include race as a factor   LIPASE - Abnormal; Notable for the following components:    Lipase 119 (*)     All other components within normal limits   CBC WITH AUTO DIFFERENTIAL - Abnormal; Notable for the following components:     Neutrophil % 82.8 (*)     Lymphocyte % 9.0 (*)     Neutrophils, Absolute 7.20 (*)     All other components within normal limits   HIGH SENSITIVITIY TROPONIN T 1HR - Abnormal; Notable for the following components:    HS Troponin T 33 (*)     Troponin T Numeric Delta 12 (*)     All other components within normal limits    Narrative:     High Sensitive Troponin T Reference Range:  <14.0 ng/L- Negative Female for AMI  <22.0 ng/L- Negative Male for AMI  >=14 - Abnormal Female indicating possible myocardial injury.  >=22 - Abnormal Male indicating possible myocardial injury.   Clinicians would have to utilize clinical acumen, EKG, Troponin, and serial changes to determine if it is an Acute Myocardial Infarction or myocardial injury due to an underlying chronic condition.        LACTIC ACID, PLASMA - Normal   TROPONIN - Normal    Narrative:     High Sensitive Troponin T Reference Range:  <14.0 ng/L- Negative Female for AMI  <22.0 ng/L- Negative Male for AMI  >=14 - Abnormal Female indicating possible myocardial injury.  >=22 - Abnormal Male indicating possible myocardial injury.   Clinicians would have to utilize clinical acumen, EKG, Troponin, and serial changes to determine if it is an Acute Myocardial Infarction or myocardial injury due to an underlying chronic condition.        RAINBOW DRAW    Narrative:     The following orders were created for panel order [place] Draw.  Procedure                               Abnormality         Status                     ---------                               -----------         ------                     Green Top (Gel)[[id number]]                                  Final result               Lavender Top[[id number]]                                     Final result               Gold Top - SST[[id number]]                                   Final result               Santos Top[[id number]]                                         Final result               Light Blue Top[[id number]]                                    Final result                 Please view results for these tests on the individual orders.   URINALYSIS W/ MICROSCOPIC IF INDICATED (NO CULTURE)   TROPONIN   CBC AND DIFFERENTIAL    Narrative:     The following orders were created for panel order CBC & Differential.  Procedure                               Abnormality         Status                     ---------                               -----------         ------                     CBC Auto Differential[[id number]]        Abnormal            Final result                 Please view results for these tests on the individual orders.   GREEN TOP   LAVENDER TOP   GOLD TOP - SST   GRAY TOP   LIGHT BLUE TOP       Meds Given in ED:   Medications   Sodium Chloride (PF) 0.9 % 10 mL (has no administration in time range)   sodium chloride 0.9 % bolus 2,000 mL (0 mL Intravenous Stopped 7/21/25 0920)   iopamidol (ISOVUE-300) 61 % injection 85 mL (85 mL Intravenous Given 7/21/25 0652)   azithromycin (ZITHROMAX) tablet 500 mg (500 mg Oral Given 7/21/25 1113)           Last NIH score:                                                          Dysphagia screening results:        [place] Coma Scale:  No data recorded     CIWA:        Restraint Type:            Isolation Status:  No active isolations

## 2025-07-21 NOTE — Clinical Note
Level of Care: Telemetry [5]   Diagnosis: Diarrhea [787.91.ICD-9-CM]   Is patient appropriate for Inpatient Observation Unit?: Yes [1]

## 2025-07-21 NOTE — THERAPY EVALUATION
Patient Name: Gee Whitney  : 1950    MRN: [id number]                              Today's Date: 2025       Admit Date: 2025    Visit Dx:     ICD-10-CM ICD-9-CM   1. E. coli gastroenteritis  A04.4 008.00   2. Syncope and collapse  R55 780.2     Patient Active Problem List   Diagnosis    Coronary artery disease    Essential hypertension    Dyslipidemia    Hypothyroidism    Gastroenteritis    Leukocytosis    Chronic cough    Chronic rhinitis    Ischemic cardiomyopathy    Heart failure    Progressive angina    CAD (coronary artery disease)    HFrEF (heart failure with reduced ejection fraction)    Diarrhea     Past Medical History:   Diagnosis Date    Chronic kidney disease     Congenital heart disease     Coronary artery disease     Disease of thyroid gland     GERD (gastroesophageal reflux disease)     Have occually but not in regulat basis    Heart failure 2025    Hyperlipidemia     Hypertension     Myocardial infarction      Past Surgical History:   Procedure Laterality Date    BRONCHOSCOPY      CARDIAC CATHETERIZATION      CARDIAC CATHETERIZATION N/A 6/10/2025    Procedure: Left Heart Cath - Right radial access;  Surgeon: Shaun Joseph MD;  Location:  [facility] CATH INVASIVE LOCATION;  Service: Cardiovascular;  Laterality: N/A;    CARDIAC CATHETERIZATION N/A 6/10/2025    Procedure: Functional Flow [place];  Surgeon: Shaun Joseph MD;  Location:  [facility] CATH INVASIVE LOCATION;  Service: Cardiovascular;  Laterality: N/A;    CARDIAC CATHETERIZATION N/A 6/10/2025    Procedure: Stent KARSTEN coronary;  Surgeon: Shaun Joseph MD;  Location:  [facility] CATH INVASIVE LOCATION;  Service: Cardiovascular;  Laterality: N/A;    HERNIA REPAIR      TENDON RELEASE      Left thumb       General Information       Row Name 25 1535          Physical Therapy Time and Intention    Document Type evaluation  -ML     Mode of Treatment physical therapy  -ML       Row Name 25 1536           General Information    Patient Profile Reviewed yes  -ML     Prior Level of Function independent:;all household mobility;community mobility;gait;transfer;bed mobility;ADL's;home management;cooking;cleaning;driving;shopping  no AD used at home; 1 fall off toilet w/ increased GI s/s  -ML     Existing Precautions/Restrictions orthostatic hypotension;cardiac;other (see comments)  monitor BP w/ recent syncope , hx CAD s/p PCI/KARSTEN (6/10/25)  -ML     Barriers to Rehab none identified  -ML       Row Name 07/21/25 1535          Living Environment    Current Living Arrangements home  -ML     People in Home spouse  -ML       Row Name 07/21/25 1535          Home Main Entrance    Number of Stairs, Main Entrance one  -ML       Row Name 07/21/25 1535          Stairs Within Home, Primary    Number of Stairs, Within Home, Primary none  -ML       Row Name 07/21/25 1535          Cognition    Orientation Status (Cognition) oriented x 4  -ML       Row Name 07/21/25 1535          Safety Issues/Impairments Affecting Functional Mobility    Impairments Affecting Function (Mobility) endurance/activity tolerance  -ML               User Key  (r) = Recorded By, (t) = Taken By, (c) = Cosigned By      Initials Name Provider Type    ML Brooklynn Wharton Physical Therapist                   Mobility       Row Name 07/21/25 1536          Bed Mobility    Bed Mobility supine-sit  -ML     Supine-Sit [place] (Bed Mobility) modified independence  -ML     Assistive Device (Bed Mobility) head of bed elevated  -ML       Row Name 07/21/25 1536          Sit-Stand Transfer    Sit-Stand [place] (Transfers) standby assist  -ML     Assistive Device (Sit-Stand Transfers) other (see comments)  no AD  -ML       Row Name 07/21/25 1536          Gait/Stairs (Locomotion)    [place] Level (Gait) contact guard  -ML     Assistive Device (Gait) other (see comments)  no AD  -ML     Distance in Feet (Gait) 14  -ML     Deviations/Abnormal Patterns (Gait) gait speed  decreased  -ML     Comment, (Gait/Stairs) CGA only due to hypotension, patient asymptomatic  -ML               User Key  (r) = Recorded By, (t) = Taken By, (c) = Cosigned By      Initials Name Provider Type    Brooklynn Mayer Physical Therapist                   Obj/Interventions       Row Name 07/21/25 1538          Range of Motion Comprehensive    General Range of Motion bilateral lower extremity ROM WFL  -ML       Row Name 07/21/25 1538          Strength Comprehensive (MMT)    General Manual Muscle Testing (MMT) Assessment no strength deficits identified  -ML     Comment, General Manual Muscle Testing (MMT) Assessment BLE 5/5  -ML       Row Name 07/21/25 1538          Balance    Balance Assessment sitting static balance;sitting dynamic balance;standing static balance;standing dynamic balance  -ML     Static Sitting Balance independent  -ML     Dynamic Sitting Balance independent  -ML     Position, Sitting Balance unsupported;sitting edge of bed  -ML     Static Standing Balance standby assist  -ML     Dynamic Standing Balance contact guard  -ML     Position/Device Used, Standing Balance unsupported  -ML     Balance Interventions sitting;standing;sit to stand;occupation based/functional task  -ML       [facility] Name 07/21/25 1538          Sensory Assessment (Somatosensory)    Sensory Assessment (Somatosensory) LE sensation intact  -ML               User Key  (r) = Recorded By, (t) = Taken By, (c) = Cosigned By      Initials Name Provider Type    Brooklynn Mayer Physical Therapist                   Goals/Plan       Row Name 07/21/25 1541          Transfer Goal 1 (PT)    Activity/Assistive Device (Transfer Goal 1, PT) sit-to-stand/stand-to-sit;bed-to-chair/chair-to-bed  -ML     [place] Level/Cues Needed (Transfer Goal 1, PT) independent  -ML     Time Frame (Transfer Goal 1, PT) short term goal (STG);5 days  -ML       Row Name 07/21/25 1541          Gait Training Goal 1 (PT)    Activity/Assistive Device (Gait Training  Goal 1, PT) gait (walking locomotion);increase endurance/gait distance  -ML     [place] Level (Gait Training Goal 1, PT) independent  -ML     Distance (Gait Training Goal 1, PT) 300  -ML     Time Frame (Gait Training Goal 1, PT) long term goal (LTG);10 days  -ML       Row Name 07/21/25 1541          Therapy Assessment/Plan (PT)    Planned Therapy Interventions (PT) gait training;home exercise program;patient/family education;postural re-education;transfer training  -ML               User Key  (r) = Recorded By, (t) = Taken By, (c) = Cosigned By      Initials Name Provider Type    ML Brooklynn Wharton Physical Therapist                   Clinical Impression       Row Name 07/21/25 1538          Pain    Pretreatment Pain Rating 0/10 - no pain  -ML     Posttreatment Pain Rating 0/10 - no pain  -ML       Row Name 07/21/25 1538          Plan of Care Review    Plan of Care Reviewed With patient;spouse  -ML     Outcome Evaluation Physical therapy evaluation complete. The patient with drop in blood pressure with change in position, however, patient asymptomatic. Patient would benefit from an additional 1-2 visits of PT in order to progress functional mobility, endurance.  -ML       Row Name 07/21/25 1538          Therapy Assessment/Plan (PT)    Patient/Family Therapy Goals Statement (PT) return to home  -ML     Rehab Potential (PT) good  -ML     Criteria for Skilled Interventions Met (PT) yes  -ML     Therapy Frequency (PT) daily  -ML     Predicted Duration of Therapy Intervention (PT) 3 days  -ML       Row Name 07/21/25 1538          Vital Signs    Pre Systolic BP Rehab 112  -ML     Pre Treatment Diastolic BP 56  -ML     Intra Systolic BP Rehab 97  -ML     Intra Treatment Diastolic BP 54  -ML     Post Systolic BP Rehab 80  -ML     Post Treatment Diastolic BP 49  123/49 upon return to sitting in recliner  -ML     Pretreatment Heart Rate (beats/min) 83  -ML     Posttreatment Heart Rate (beats/min) 80  -ML     Pre Patient  Position Supine  -ML     Intra Patient Position Sitting  -ML     Post Patient Position Standing  -ML       Row Name 07/21/25 1538          Positioning and Restraints    Pre-Treatment Position in bed  -ML     Post Treatment Position chair  -ML     In Chair notified nsg;reclined;call light within reach;encouraged to call for assist;with family/caregiver;legs elevated  chair alarm status unchanged  -ML               User Key  (r) = Recorded By, (t) = Taken By, (c) = Cosigned By      Initials Name Provider Type    Brooklynn Mayer Physical Therapist                   Outcome Measures       Row Name 07/21/25 1541 07/21/25 1327       How much help from another person do you currently need...    Turning from your back to your side while in flat bed without using bedrails? 4  -ML 4  -CT    Moving from lying on back to sitting on the side of a flat bed without bedrails? 4  -ML 4  -CT    Moving to and from a bed to a chair (including a wheelchair)? 4  -ML 4  -CT    Standing up from a chair using your arms (e.g., wheelchair, bedside chair)? 4  -ML 4  -CT    Climbing 3-5 steps with a railing? 3  -ML 4  -CT    To walk in hospital room? 3  -ML 4  -CT    AM-PAC 6 Clicks Score (PT) 22  -ML 24  -CT    Highest Level of Mobility Goal Walk 25 Feet or More-7  -ML Walk 250 Feet or More - 8  -CT      Row Name 07/21/25 1541 07/21/25 1513       Functional Assessment    Outcome Measure Options AM-PAC 6 Clicks Basic Mobility (PT)  -ML AM-PAC 6 Clicks Daily Activity (OT)  -JY              User Key  (r) = Recorded By, (t) = Taken By, (c) = Cosigned By      Initials Name Provider Type    Wendi Stein OT Occupational Therapist    Brooklynn Mayer Physical Therapist    CT Nohemi Zimmerman RN Registered Nurse                                 Physical Therapy Education       Title: PT OT SLP Therapies (In Progress)       Topic: Physical Therapy (In Progress)       Point: Mobility training (Done)       Learning Progress Summary            Patient  Acceptance, E, VU by ML at 7/21/2025 1541   Family Acceptance, E, VU by ML at 7/21/2025 1541                      Point: Home exercise program (Not Started)       Learner Progress:  Not documented in this visit.              Point: Body mechanics (Not Started)       Learner Progress:  Not documented in this visit.              Point: Precautions (Done)       Learning Progress Summary            Patient Acceptance, E, VU by ML at 7/21/2025 1541   Family Acceptance, E, VU by ML at 7/21/2025 1541                                      User Key       Initials Effective Dates Name Provider Type Discipline     04/22/21 -  Brooklynn Wharton Physical Therapist PT                  PT Recommendation and Plan  Planned Therapy Interventions (PT): gait training, home exercise program, patient/family education, postural re-education, transfer training  Outcome Evaluation: Physical therapy evaluation complete. The patient with drop in blood pressure with change in position, however, patient asymptomatic. Patient would benefit from an additional 1-2 visits of PT in order to progress functional mobility, endurance.     Time Calculation:   PT Evaluation Complexity  History, PT Evaluation Complexity: 1-2 personal factors and/or comorbidities  Examination of Body Systems (PT Eval Complexity): 1-2 elements  Clinical Presentation (PT Evaluation Complexity): evolving  Clinical Decision Making (PT Evaluation Complexity): low complexity  Overall Complexity (PT Evaluation Complexity): low complexity     PT Charges       Row Name 07/21/25 1542             Time Calculation    Start Time 1412  -ML      PT Received On 07/21/25  -ML      PT Goal Re-Cert Due Date 07/31/25  -ML         Untimed Charges    PT Eval/Re-eval Minutes 46  -ML         Total Minutes    Untimed Charges Total Minutes 46  -ML       Total Minutes 46  -ML                User Key  (r) = Recorded By, (t) = Taken By, (c) = Cosigned By      Initials Name Provider Type    Brooklynn Mayer  Physical Therapist                  Therapy Charges for Today       Code Description Service Date Service Provider Modifiers Qty    57133949548 HC PT EVAL LOW COMPLEXITY 4 7/21/2025 Brooklynn Wharton GP 1            PT G-Codes  Outcome Measure Options: AM-PAC 6 Clicks Basic Mobility (PT)  AM-PAC 6 Clicks Score (PT): 22  AM-PAC 6 Clicks Score (OT): 24  PT Discharge Summary  Anticipated Discharge Disposition (PT): home with assist    Brooklynn Wharton  7/21/2025

## 2025-07-21 NOTE — H&P
[facility] Medicine Services  HISTORY AND PHYSICAL    Patient Name: Gee Whitney  : 1950  MRN: [id number]  Primary Care Physician: Pramod Blevins MD  Date of admission: 2025      Subjective   Subjective     Chief Complaint: Diarrhea    HPI:  Gee Whitney is a 74 y.o. male with history of CAD s/p PCI/KARSTEN to LAD, HFrEF, hypothyroidism, HTN, HL, here with 3 days of nausea/chills/diarrhea. Profuse diarrhea, at least 10/day, non-bloody. Today with diarrhea/nausea, followed by syncope. In ED found to have enteroaggregative E. Coli and diarrheal illness on CT.    Review of Systems   Constitutional:  Positive for activity change and fatigue.   HENT: Negative.     Respiratory: Negative.     Cardiovascular: Negative.    Gastrointestinal:  Positive for abdominal distention, abdominal pain, diarrhea and nausea.   Neurological:  Positive for dizziness, syncope and weakness.           Personal History     Past Medical History:   Diagnosis Date    Chronic kidney disease     Congenital heart disease     Coronary artery disease     Disease of thyroid gland     GERD (gastroesophageal reflux disease)     Have occually but not in regulat basis    Heart failure 2025    Hyperlipidemia     Hypertension     Myocardial infarction            Past Surgical History:   Procedure Laterality Date    BRONCHOSCOPY      CARDIAC CATHETERIZATION      CARDIAC CATHETERIZATION N/A 6/10/2025    Procedure: Left Heart Cath - Right radial access;  Surgeon: Shaun Joseph MD;  Location:  [facility] CATH INVASIVE LOCATION;  Service: Cardiovascular;  Laterality: N/A;    CARDIAC CATHETERIZATION N/A 6/10/2025    Procedure: Functional Flow [place];  Surgeon: Shaun Joseph MD;  Location:  [facility] CATH INVASIVE LOCATION;  Service: Cardiovascular;  Laterality: N/A;    CARDIAC CATHETERIZATION N/A 6/10/2025    Procedure: Stent KARSTEN coronary;  Surgeon: Shaun Joseph MD;  Location:  [facility] CATH  INVASIVE LOCATION;  Service: Cardiovascular;  Laterality: N/A;    HERNIA REPAIR      TENDON RELEASE      Left thumb        Family History: family history includes Asthma in his brother; Heart attack in his mother and sister; Heart disease in his mother; Heart failure in his mother; Hypertension in his child; Mitral valve prolapse in his sister; Other (age of onset: 28) in his father; Stroke in his brother.     Social History:  reports that he quit smoking about 48 years ago. His smoking use included cigarettes. He started smoking about 58 years ago. He has a 10 pack-year smoking history. He has been exposed to tobacco smoke. He has never used smokeless tobacco. He reports current alcohol use. He reports that he does not use drugs.  Social History     Social History Narrative    Not on file       Medications:  Available home medication information reviewed.  Fish Oil, Multiple Vitamins-Minerals, Polyethylene Glycol 3350, albuterol sulfate HFA, aspirin, atorvastatin, carvedilol, clopidogrel, furosemide, ipratropium, levothyroxine, nitroglycerin, sacubitril-valsartan, spironolactone, terazosin, tiotropium bromide-olodaterol, traZODone, and vitamin D    No Known Allergies    Objective   Objective     Vital Signs:   Temp:  [97.4 °F (36.3 °C)] 97.4 °F (36.3 °C)  Heart Rate:  [56-84] 65  Resp:  [13-22] 16  BP: ()/(52-65) 111/63       Physical Exam   NAD, alert and oriented  OP clear, dry MM  Neck supple  No LAD  RRR  CTAB  +BS, soft, distended, TTP  DAVIS  Normal affect    Result Review:  I have personally reviewed the results from the time of this admission to 7/21/2025 11:38 EDT and agree with these findings:  []  Laboratory list / accordion  []  Microbiology  []  Radiology  []  EKG/Telemetry   []  Cardiology/Vascular   []  Pathology  []  Old records  []  Other:  Most notable findings include: WBC 8, Hgb 15.1, platelets 169, K 5.3, creatinine 1.59      LAB RESULTS:      Lab 07/21/25  0611   WBC 8.69   HEMOGLOBIN 15.1    HEMATOCRIT 45.1   PLATELETS 169   NEUTROS ABS 7.20*   IMMATURE GRANS (ABS) 0.03   LYMPHS ABS 0.78   MONOS ABS 0.57   EOS ABS 0.10   MCV 91.5   LACTATE 1.3         Lab 07/21/25  0611   SODIUM 136   POTASSIUM 5.3*   CHLORIDE 106   CO2 19.7*   ANION GAP 10.3   BUN 32.6*   CREATININE 1.59*   EGFR 44.7*   GLUCOSE 144*   CALCIUM 9.1         Lab 07/21/25  0611   TOTAL PROTEIN 5.8*   ALBUMIN 4.0   GLOBULIN 1.8   ALT (SGPT) 16   AST (SGOT) 20   BILIRUBIN 0.4   ALK PHOS 65   LIPASE 119*         Lab 07/21/25  0907 07/21/25  0711   HSTROP T 33* 21                     Microbiology Results (last 10 days)       Procedure Component Value - Date/Time    Gastrointestinal Panel, PCR - Stool, Per Rectum [[id number]]  (Abnormal) Collected: 07/21/25 0801    Lab Status: Final result Specimen: Stool from Per Rectum Updated: 07/21/25 1026     Campylobacter Not Detected     Plesiomonas shigelloides Not Detected     Salmonella Not Detected     Vibrio Not Detected     Vibrio cholerae Not Detected     Yersinia enterocolitica Not Detected     Enteroaggregative E. coli (EAEC) Detected     Enteropathogenic E. coli (EPEC) Not Detected     Enterotoxigenic E. coli (ETEC) lt/st Not Detected     Shiga-like toxin-producing E. coli (STEC) stx1/stx2 Not Detected     Shigella/Enteroinvasive E. coli (EIEC) Not Detected     Cryptosporidium Not Detected     Cyclospora cayetanensis Not Detected     Entamoeba histolytica Not Detected     Giardia lamblia Not Detected     Adenovirus F40/41 Not Detected     Astrovirus Not Detected     Norovirus GI/GII Not Detected     Rotavirus A Not Detected     Sapovirus (I, II, IV or V) Not Detected            CT Abdomen Pelvis With Contrast  Result Date: 7/21/2025  CT ABDOMEN PELVIS W CONTRAST Date of Exam: 7/21/2025 6:50 AM EDT Indication: hs abdominal pain, hypotension, diarrhea. Comparison: CT abdomen and pelvis 3/14/2023 Technique: Axial CT images were obtained of the abdomen and pelvis following the uneventful  intravenous administration of 85 mL Isovue-300. Reconstructed coronal and sagittal images were also obtained. Automated exposure control and iterative construction methods were used. Findings: Unremarkable appearance of the lower thorax. Unremarkable appearance of the liver, gallbladder, bile ducts, spleen, pancreas, and adrenal glands. Redemonstration of multiple bilateral exophytic simple renal cysts. Normal appearance of the ureters and decompressed bladder. The prostate is enlarged. Normal appearance of the seminal vesicles. There is fluid/unformed stool within the colon compatible with diarrheal state without apparent inflammatory change of the colon. No bowel obstruction. No definite  inflammatory change of the GI tract. No abdominopelvic free fluid. No pneumoperitoneum. No abdominopelvic fat stranding. There is atherosclerosis of the aorta and iliac branches with otherwise grossly unremarkable appearance of the vasculature. No bulky  or suspicious abdominopelvic lymph nodes. Unremarkable appearance of the body wall soft tissues. No acute or suspicious bony findings.     Impression: Impression: No acute abdominopelvic findings. Fluid/unformed stool within the colon compatible with diarrheal state. Electronically Signed: Cali Singh MD  7/21/2025 7:16 AM EDT  Workstation ID: [id number]      Results for orders placed during the hospital encounter of 05/09/25    Adult Transthoracic Echo Complete W/ Cont if Necessary Per Protocol 05/09/2025 12:04 PM    Interpretation Summary    Left ventricular systolic function is moderately decreased.Left ventricular ejection fraction appears to be 31 - 35%.    The following left ventricular wall segments are dyskinetic: mid anteroseptal. The following left ventricular wall segments are akinetic: mid anterior, apical anterior, apical septal and apex. The following left ventricular wall segments are aneurysmal: mid anteroseptal.    No hemodynamically significant valvular  heart      Assessment & Plan   Assessment & Plan       Diarrhea    Coronary artery disease    Essential hypertension    Dyslipidemia    Hypothyroidism    HFrEF (heart failure with reduced ejection fraction)    Enteroaggregative E. Coli Diarrhea  Syncope  Volume depletion  DEB/CKD  -s/p IVF  -azithromycin  -continuous IVF  -prn nausea meds  -liquids, advance diet as tolerated    HyperK  -IVF, lokelma    CAD s/p KARSTEN 6/10  -asa/plavix    HFrEF  -hold entresto for hypotension  -hold diuretics for diarrheal illness/syncope/volume depletion  -monitor, resume CHF meds as vitals/renal function tolerates    Hypothyroidism      VTE Prophylaxis:  No VTE prophylaxis order currently exists.          CODE STATUS:    There are no questions and answers to display.       Expected Discharge   Expected discharge date/ time has not been documented.     Chirag Bae MD  07/21/25

## 2025-07-21 NOTE — PLAN OF CARE
Goal Outcome Evaluation:  Plan of Care Reviewed With: patient, spouse           Outcome Evaluation: Physical therapy evaluation complete. The patient with drop in blood pressure with change in position, however, patient asymptomatic. Patient would benefit from an additional 1-2 visits of PT in order to progress functional mobility, endurance.    Anticipated Discharge Disposition (PT): home with assist

## 2025-07-22 LAB
ALBUMIN SERPL-MCNC: 3.1 G/DL (ref 3.5–5.2)
ALBUMIN/GLOB SERPL: 2.1 G/DL
ALP SERPL-CCNC: 54 U/L (ref 39–117)
ALT SERPL W P-5'-P-CCNC: 7 U/L (ref 1–41)
ANION GAP SERPL CALCULATED.3IONS-SCNC: 8 MMOL/L (ref 5–15)
AST SERPL-CCNC: 11 U/L (ref 1–40)
BILIRUB SERPL-MCNC: 0.5 MG/DL (ref 0–1.2)
BILIRUB UR QL STRIP: NEGATIVE
BUN SERPL-MCNC: 29.2 MG/DL (ref 8–23)
BUN/CREAT SERPL: 21.3 (ref 7–25)
CALCIUM SPEC-SCNC: 7.9 MG/DL (ref 8.6–10.5)
CHLORIDE SERPL-SCNC: 109 MMOL/L (ref 98–107)
CLARITY UR: CLEAR
CO2 SERPL-SCNC: 17 MMOL/L (ref 22–29)
COLOR UR: YELLOW
CREAT SERPL-MCNC: 1.37 MG/DL (ref 0.76–1.27)
DEPRECATED RDW RBC AUTO: 45.6 FL (ref 37–54)
EGFRCR SERPLBLD CKD-EPI 2021: 53.5 ML/MIN/1.73
ERYTHROCYTE [DISTWIDTH] IN BLOOD BY AUTOMATED COUNT: 13.3 % (ref 12.3–15.4)
GLOBULIN UR ELPH-MCNC: 1.5 GM/DL
GLUCOSE SERPL-MCNC: 92 MG/DL (ref 65–99)
GLUCOSE UR STRIP-MCNC: NEGATIVE MG/DL
HCT VFR BLD AUTO: 36.9 % (ref 37.5–51)
HGB BLD-MCNC: 12.3 G/DL (ref 13–17.7)
HGB UR QL STRIP.AUTO: NEGATIVE
KETONES UR QL STRIP: NEGATIVE
LEUKOCYTE ESTERASE UR QL STRIP.AUTO: NEGATIVE
MCH RBC QN AUTO: 30.8 PG (ref 26.6–33)
MCHC RBC AUTO-ENTMCNC: 33.3 G/DL (ref 31.5–35.7)
MCV RBC AUTO: 92.3 FL (ref 79–97)
NITRITE UR QL STRIP: NEGATIVE
PH UR STRIP.AUTO: <=5 [PH] (ref 5–8)
PLATELET # BLD AUTO: 140 10*3/MM3 (ref 140–450)
PMV BLD AUTO: 9.1 FL (ref 6–12)
POTASSIUM SERPL-SCNC: 4.6 MMOL/L (ref 3.5–5.2)
PROT SERPL-MCNC: 4.6 G/DL (ref 6–8.5)
PROT UR QL STRIP: NEGATIVE
RBC # BLD AUTO: 4 10*6/MM3 (ref 4.14–5.8)
SODIUM SERPL-SCNC: 134 MMOL/L (ref 136–145)
SP GR UR STRIP: 1.01 (ref 1–1.03)
UROBILINOGEN UR QL STRIP: NORMAL
WBC NRBC COR # BLD AUTO: 10.5 10*3/MM3 (ref 3.4–10.8)

## 2025-07-22 PROCEDURE — 25010000002 AZITHROMYCIN PER 500 MG: Performed by: HOSPITALIST

## 2025-07-22 PROCEDURE — 80053 COMPREHEN METABOLIC PANEL: CPT | Performed by: HOSPITALIST

## 2025-07-22 PROCEDURE — 85027 COMPLETE CBC AUTOMATED: CPT | Performed by: HOSPITALIST

## 2025-07-22 PROCEDURE — 63710000001 REVEFENACIN 175 MCG/3ML SOLUTION: Performed by: HOSPITALIST

## 2025-07-22 PROCEDURE — G0378 HOSPITAL OBSERVATION PER HR: HCPCS

## 2025-07-22 PROCEDURE — 81003 URINALYSIS AUTO W/O SCOPE: CPT | Performed by: HOSPITALIST

## 2025-07-22 PROCEDURE — 25810000003 SODIUM CHLORIDE 0.9 % SOLUTION 250 ML FLEX CONT: Performed by: HOSPITALIST

## 2025-07-22 PROCEDURE — 87040 BLOOD CULTURE FOR BACTERIA: CPT | Performed by: NURSE PRACTITIONER

## 2025-07-22 PROCEDURE — 99233 SBSQ HOSP IP/OBS HIGH 50: CPT | Performed by: PHYSICIAN ASSISTANT

## 2025-07-22 PROCEDURE — 94799 UNLISTED PULMONARY SVC/PX: CPT

## 2025-07-22 PROCEDURE — 94664 DEMO&/EVAL PT USE INHALER: CPT

## 2025-07-22 RX ORDER — ACETAMINOPHEN 500 MG
500 TABLET ORAL EVERY 6 HOURS PRN
Status: DISCONTINUED | OUTPATIENT
Start: 2025-07-22 | End: 2025-07-23 | Stop reason: HOSPADM

## 2025-07-22 RX ADMIN — REVEFENACIN 175 MCG: 175 SOLUTION RESPIRATORY (INHALATION) at 08:12

## 2025-07-22 RX ADMIN — TRAZODONE HYDROCHLORIDE 50 MG: 50 TABLET ORAL at 20:41

## 2025-07-22 RX ADMIN — ARFORMOTEROL TARTRATE 15 MCG: 15 SOLUTION RESPIRATORY (INHALATION) at 20:21

## 2025-07-22 RX ADMIN — ATORVASTATIN CALCIUM 80 MG: 40 TABLET, FILM COATED ORAL at 20:41

## 2025-07-22 RX ADMIN — AZITHROMYCIN DIHYDRATE 500 MG: 500 INJECTION, POWDER, LYOPHILIZED, FOR SOLUTION INTRAVENOUS at 08:23

## 2025-07-22 RX ADMIN — ARFORMOTEROL TARTRATE 15 MCG: 15 SOLUTION RESPIRATORY (INHALATION) at 08:12

## 2025-07-22 RX ADMIN — LEVOTHYROXINE SODIUM 75 MCG: 0.07 TABLET ORAL at 05:37

## 2025-07-22 RX ADMIN — ACETAMINOPHEN 500 MG: 500 TABLET, FILM COATED ORAL at 01:39

## 2025-07-22 RX ADMIN — CLOPIDOGREL BISULFATE 75 MG: 75 TABLET, FILM COATED ORAL at 08:26

## 2025-07-22 RX ADMIN — PANTOPRAZOLE SODIUM 40 MG: 40 TABLET, DELAYED RELEASE ORAL at 05:37

## 2025-07-22 NOTE — PROGRESS NOTES
[facility] Medicine Services  PROGRESS NOTE    Patient Name: Gee Whitney  : 1950  MRN: [id number]    Date of Admission: 2025  Primary Care Physician: Pramod Blevins MD    Subjective   Subjective     CC:  Following for diarrhea    HPI:  No new overnight events. Awake in bed, wife at bedside.  No diarrhea since yesteray at 3 pm.  Has been up to [facility] only, no BM since admission.  Feels much better.  Asking about timing of DC and when he can restart cardiac rehab.        Objective   Objective     Vital Signs:   Temp:  [98.5 °F (36.9 °C)-100.8 °F (38.2 °C)] 98.5 °F (36.9 °C)  Heart Rate:  [55-96] 55  Resp:  [16-22] 17  BP: ()/(50-67) 110/67     Physical Exam:  Constitutional: No acute distress, awake, alert  HENT: NCAT, mucous membranes moist  Respiratory: Clear to auscultation bilaterally, respiratory effort normal   Cardiovascular: RRR, no murmurs, rubs, or gallops  Gastrointestinal: Positive bowel sounds, soft, nontender, nondistended  Musculoskeletal: No bilateral ankle edema  Psychiatric: Appropriate affect, cooperative  Neurologic: Oriented x 3, strength symmetric in all extremities, Cranial Nerves grossly intact to confrontation, speech clear  Skin: No rashes    Results Reviewed:  LAB RESULTS:      Lab 25  0618 25  2111 25  1214 25  0907 25  0711 25  0611   WBC 10.50  --   --   --   --  8.69   HEMOGLOBIN 12.3* 13.6  --   --   --  15.1   HEMATOCRIT 36.9* 40.4  --   --   --  45.1   PLATELETS 140  --   --   --   --  169   NEUTROS ABS  --   --   --   --   --  7.20*   IMMATURE GRANS (ABS)  --   --   --   --   --  0.03   LYMPHS ABS  --   --   --   --   --  0.78   MONOS ABS  --   --   --   --   --  0.57   EOS ABS  --   --   --   --   --  0.10   MCV 92.3  --   --   --   --  91.5   LACTATE  --   --   --   --   --  1.3   HSTROP T  --   --  32* 33* 21  --          Lab 25  0617 25  1458 25  0611   SODIUM 134*  --  136    POTASSIUM 4.6 5.3* 5.3*   CHLORIDE 109*  --  106   CO2 17.0*  --  19.7*   ANION GAP 8.0  --  10.3   BUN 29.2*  --  32.6*   CREATININE 1.37*  --  1.59*   EGFR 53.5*  --  44.7*   GLUCOSE 92  --  144*   CALCIUM 7.9*  --  9.1         Lab 07/22/25  0617 07/21/25  0611   TOTAL PROTEIN 4.6* 5.8*   ALBUMIN 3.1* 4.0   GLOBULIN 1.5 1.8   ALT (SGPT) 7 16   AST (SGOT) 11 20   BILIRUBIN 0.5 0.4   ALK PHOS 54 65   LIPASE  --  119*         Lab 07/21/25  1214 07/21/25  0907 07/21/25  0711   HSTROP T 32* 33* 21                 Brief Urine Lab Results       None            Microbiology Results Abnormal       Procedure Component Value - Date/Time    Gastrointestinal Panel, PCR - Stool, Per Rectum [[id number]]  (Abnormal) Collected: 07/21/25 0801    Lab Status: Final result Specimen: Stool from Per Rectum Updated: 07/21/25 1026     Campylobacter Not Detected     Plesiomonas shigelloides Not Detected     Salmonella Not Detected     Vibrio Not Detected     Vibrio cholerae Not Detected     Yersinia enterocolitica Not Detected     Enteroaggregative E. coli (EAEC) Detected     Enteropathogenic E. coli (EPEC) Not Detected     Enterotoxigenic E. coli (ETEC) lt/st Not Detected     Shiga-like toxin-producing E. coli (STEC) stx1/stx2 Not Detected     Shigella/Enteroinvasive E. coli (EIEC) Not Detected     Cryptosporidium Not Detected     Cyclospora cayetanensis Not Detected     Entamoeba histolytica Not Detected     Giardia lamblia Not Detected     Adenovirus F40/41 Not Detected     Astrovirus Not Detected     Norovirus GI/GII Not Detected     Rotavirus A Not Detected     Sapovirus (I, II, IV or V) Not Detected            CT Abdomen Pelvis With Contrast  Result Date: 7/21/2025  CT ABDOMEN PELVIS W CONTRAST Date of Exam: 7/21/2025 6:50 AM EDT Indication: [facility] abdominal pain, hypotension, diarrhea. Comparison: CT abdomen and pelvis 3/14/2023 Technique: Axial CT images were obtained of the abdomen and pelvis following the uneventful intravenous  administration of 85 mL Isovue-300. Reconstructed coronal and sagittal images were also obtained. Automated exposure control and iterative construction methods were used. Findings: Unremarkable appearance of the lower thorax. Unremarkable appearance of the liver, gallbladder, bile ducts, spleen, pancreas, and adrenal glands. Redemonstration of multiple bilateral exophytic simple renal cysts. Normal appearance of the ureters and decompressed bladder. The prostate is enlarged. Normal appearance of the seminal vesicles. There is fluid/unformed stool within the colon compatible with diarrheal state without apparent inflammatory change of the colon. No bowel obstruction. No definite  inflammatory change of the GI tract. No abdominopelvic free fluid. No pneumoperitoneum. No abdominopelvic fat stranding. There is atherosclerosis of the aorta and iliac branches with otherwise grossly unremarkable appearance of the vasculature. No bulky  or suspicious abdominopelvic lymph nodes. Unremarkable appearance of the body wall soft tissues. No acute or suspicious bony findings.     Impression: Impression: No acute abdominopelvic findings. Fluid/unformed stool within the colon compatible with diarrheal state. Electronically Signed: Cali Singh MD  7/21/2025 7:16 AM EDT  Workstation ID: [id number]      Results for orders placed during the hospital encounter of 05/09/25    Adult Transthoracic Echo Complete W/ Cont if Necessary Per Protocol 05/09/2025 12:04 PM    Interpretation Summary    Left ventricular systolic function is moderately decreased.Left ventricular ejection fraction appears to be 31 - 35%.    The following left ventricular wall segments are dyskinetic: mid anteroseptal. The following left ventricular wall segments are akinetic: mid anterior, apical anterior, apical septal and apex. The following left ventricular wall segments are aneurysmal: mid anteroseptal.    No hemodynamically significant valvular heart      Current  medications:  Scheduled Meds:arformoterol, 15 mcg, Nebulization, BID - RT   And  revefenacin, 175 mcg, Nebulization, Daily - RT  atorvastatin, 80 mg, Oral, Nightly  azithromycin, 500 mg, Intravenous, Q24H  clopidogrel, 75 mg, Oral, Daily  levothyroxine, 75 mcg, Oral, Q AM  pantoprazole, 40 mg, Oral, Q AM  [Held by provider] sacubitril-valsartan, 1 tablet, Oral, Q12H  traZODone, 50 mg, Oral, Nightly      Continuous Infusions:sodium chloride, 100 mL/hr, Last Rate: 100 mL/hr (07/21/25 5158)      PRN Meds:.  acetaminophen    Albuterol Sulfate NEB Orderable    calcium carbonate    ondansetron    prochlorperazine **OR** prochlorperazine **OR** prochlorperazine    Sodium Chloride (PF)    Assessment & Plan   Assessment & Plan     Active Hospital Problems    Diagnosis  POA    **Diarrhea [R19.7]  Yes    HFrEF (heart failure with reduced ejection fraction) [I50.20]  Yes    Hypothyroidism [E03.9]  Yes    Coronary artery disease [I25.10]  Yes    Essential hypertension [I10]  Yes    Dyslipidemia [E78.5]  Yes      Resolved Hospital Problems   No resolved problems to display.        Brief Hospital Course to date:  Gee Whitney is a 74 y.o. male with PMHx of CAD (PCI/KARSTEN to LAD), chronic HFrEF, hypothyroidism, HTN, HLP who presents to [facility] ED with 3 days of nausea/chills/diarrhea. Profuse diarrhea, at least 10/day, non-bloody. Day of admission he had episode of diarrhea/nausea, followed by syncope. In ED, found to have enteroaggregative E. Coli and diarrheal illness on CT. He was started on IVF and antibiotics and admitted to hospital medicine for further management.     Enteroaggregative E. Coli/ Diarrhea  Syncope 2/2 volume depletion\  DEB/CKD, improving  -DC IVF, encourage oral intake.  -Continue azithromycin. Blood Cx pending.  -CL diet with advancement as tolerated  -prn nausea meds       Hyperkalemia (5.3)  -Given IVF and lokelma in ED  -WNL today, trend daily labs.     CAD s/p KARSTEN 6/10  -Continue asa/plavix  -Continue high  intensity statin     Chronic HFrEF, EF 31-35%  -hold entresto and diuretics for diarrheal illness/syncope/volume depletion/hypotension  -Patient is orthostatic.  -monitor, resume GDMT as vitals/renal function tolerates  -Can resume cardiac rehab on Friday.     Hypothyroidism  -TSH 2.92 in June 2025       Expected Discharge Location and Transportation: Home with wife  Expected Discharge 7/23/2025  Expected discharge date/ time has not been documented.     VTE Prophylaxis:  No VTE prophylaxis order currently exists.         AM-PAC 6 Clicks Score (PT): 22 (07/21/25 2004)    CODE STATUS:   Code Status and Medical Interventions: CPR (Attempt to Resuscitate); Full Support   Ordered at: 07/21/25 1906     Code Status (Patient has no pulse and is not breathing):    CPR (Attempt to Resuscitate)     Medical Interventions (Patient has pulse or is breathing):    Full Support       Miriam Marina PA-C  07/22/25

## 2025-07-22 NOTE — PLAN OF CARE
Goal Outcome Evaluation:  Plan of Care Reviewed With: patient        Progress: improving  Outcome Evaluation: a/o x 4; VSS, RA; pleasant, no acute changes to report from overnight; will continue POC                  Problem: Adult Inpatient Plan of Care  Goal: Absence of Hospital-Acquired Illness or Injury  Intervention: Identify and Manage Fall Risk  Recent Flowsheet Documentation  Taken 7/22/2025 0546 by Vasyl Johnson, RN  Safety Promotion/Fall Prevention:   assistive device/personal items within reach   clutter free environment maintained   activity supervised   fall prevention program maintained   nonskid shoes/slippers when out of bed   safety round/check completed   room organization consistent  Taken 7/22/2025 0400 by Vasyl Johnson, RN  Safety Promotion/Fall Prevention:   assistive device/personal items within reach   clutter free environment maintained   activity supervised   fall prevention program maintained   nonskid shoes/slippers when out of bed   room organization consistent   safety round/check completed  Taken 7/22/2025 0200 by Vasyl Johnson, RN  Safety Promotion/Fall Prevention:   activity supervised   assistive device/personal items within reach   clutter free environment maintained   fall prevention program maintained   nonskid shoes/slippers when out of bed   safety round/check completed   room organization consistent  Taken 7/22/2025 0000 by Vasyl Johnson, RN  Safety Promotion/Fall Prevention:   activity supervised   assistive device/personal items within reach   clutter free environment maintained   fall prevention program maintained   nonskid shoes/slippers when out of bed   safety round/check completed   room organization consistent  Taken 7/21/2025 2200 by Vasyl Johnson, RN  Safety Promotion/Fall Prevention:   assistive device/personal items within reach   clutter free environment maintained   activity supervised   fall prevention program maintained   nonskid  shoes/slippers when out of bed   room organization consistent   safety round/check completed  Taken 7/21/2025 2004 by Vasyl Johnson RN  Safety Promotion/Fall Prevention:   activity supervised   assistive device/personal items within reach   clutter free environment maintained   fall prevention program maintained   nonskid shoes/slippers when out of bed   safety round/check completed   room organization consistent     Problem: Adult Inpatient Plan of Care  Goal: Absence of Hospital-Acquired Illness or Injury  Intervention: Prevent Skin Injury  Recent Flowsheet Documentation  Taken 7/22/2025 0546 by Vasyl Johnson RN  Body Position: position changed independently  Skin Protection:   incontinence pads utilized   transparent dressing maintained  Taken 7/22/2025 0400 by Vasyl Johnson RN  Body Position: position changed independently  Skin Protection:   incontinence pads utilized   transparent dressing maintained  Taken 7/22/2025 0200 by Vasyl Johnson RN  Body Position: position changed independently  Skin Protection:   incontinence pads utilized   transparent dressing maintained  Taken 7/22/2025 0000 by Vasyl Johnson RN  Body Position: position changed independently  Skin Protection:   incontinence pads utilized   transparent dressing maintained  Taken 7/21/2025 2200 by Vasyl Johnson RN  Body Position: position changed independently  Skin Protection:   incontinence pads utilized   transparent dressing maintained  Taken 7/21/2025 2004 by Vasyl Johnson RN  Body Position: position changed independently  Skin Protection:   incontinence pads utilized   transparent dressing maintained

## 2025-07-23 ENCOUNTER — READMISSION MANAGEMENT (OUTPATIENT)
Dept: CALL CENTER | Facility: HOSPITAL | Age: 75
End: 2025-07-23
Payer: MEDICARE

## 2025-07-23 ENCOUNTER — APPOINTMENT (OUTPATIENT)
Dept: CARDIAC REHAB | Facility: HOSPITAL | Age: 75
End: 2025-07-23
Payer: MEDICARE

## 2025-07-23 VITALS
SYSTOLIC BLOOD PRESSURE: 163 MMHG | OXYGEN SATURATION: 94 % | TEMPERATURE: 98.7 F | DIASTOLIC BLOOD PRESSURE: 86 MMHG | RESPIRATION RATE: 18 BRPM | WEIGHT: 166.67 LBS | HEART RATE: 82 BPM | BODY MASS INDEX: 23.86 KG/M2 | HEIGHT: 70 IN

## 2025-07-23 PROBLEM — I50.22 CHRONIC HFREF (HEART FAILURE WITH REDUCED EJECTION FRACTION): Status: ACTIVE | Noted: 2025-07-23

## 2025-07-23 LAB
ALBUMIN SERPL-MCNC: 3.6 G/DL (ref 3.5–5.2)
ALBUMIN/GLOB SERPL: 1.6 G/DL
ALP SERPL-CCNC: 65 U/L (ref 39–117)
ALT SERPL W P-5'-P-CCNC: 8 U/L (ref 1–41)
ANION GAP SERPL CALCULATED.3IONS-SCNC: 12.2 MMOL/L (ref 5–15)
ANION GAP SERPL CALCULATED.3IONS-SCNC: 6.6 MMOL/L (ref 5–15)
AST SERPL-CCNC: 22 U/L (ref 1–40)
BACTERIA UR QL AUTO: ABNORMAL /HPF
BASOPHILS # BLD AUTO: 0.03 10*3/MM3 (ref 0–0.2)
BASOPHILS NFR BLD AUTO: 0.3 % (ref 0–1.5)
BILIRUB SERPL-MCNC: 0.3 MG/DL (ref 0–1.2)
BILIRUB UR QL STRIP: NEGATIVE
BUN SERPL-MCNC: 21.5 MG/DL (ref 8–23)
BUN SERPL-MCNC: 23.5 MG/DL (ref 8–23)
BUN/CREAT SERPL: 15.6 (ref 7–25)
BUN/CREAT SERPL: 17.1 (ref 7–25)
CALCIUM SPEC-SCNC: 8.4 MG/DL (ref 8.6–10.5)
CALCIUM SPEC-SCNC: 8.8 MG/DL (ref 8.6–10.5)
CHLORIDE SERPL-SCNC: 110 MMOL/L (ref 98–107)
CHLORIDE SERPL-SCNC: 112 MMOL/L (ref 98–107)
CLARITY UR: CLEAR
CO2 SERPL-SCNC: 16.8 MMOL/L (ref 22–29)
CO2 SERPL-SCNC: 19.4 MMOL/L (ref 22–29)
COLOR UR: YELLOW
CREAT SERPL-MCNC: 1.26 MG/DL (ref 0.76–1.27)
CREAT SERPL-MCNC: 1.51 MG/DL (ref 0.76–1.27)
D-LACTATE SERPL-SCNC: 1.9 MMOL/L (ref 0.5–2)
DEPRECATED RDW RBC AUTO: 45.1 FL (ref 37–54)
EGFRCR SERPLBLD CKD-EPI 2021: 47.6 ML/MIN/1.73
EGFRCR SERPLBLD CKD-EPI 2021: 59.1 ML/MIN/1.73
EOSINOPHIL # BLD AUTO: 0.32 10*3/MM3 (ref 0–0.4)
EOSINOPHIL NFR BLD AUTO: 3 % (ref 0.3–6.2)
ERYTHROCYTE [DISTWIDTH] IN BLOOD BY AUTOMATED COUNT: 13.2 % (ref 12.3–15.4)
GLOBULIN UR ELPH-MCNC: 2.3 GM/DL
GLUCOSE SERPL-MCNC: 130 MG/DL (ref 65–99)
GLUCOSE SERPL-MCNC: 94 MG/DL (ref 65–99)
GLUCOSE UR STRIP-MCNC: NEGATIVE MG/DL
HCT VFR BLD AUTO: 47.9 % (ref 37.5–51)
HGB BLD-MCNC: 15.6 G/DL (ref 13–17.7)
HGB UR QL STRIP.AUTO: NEGATIVE
HOLD SPECIMEN: NORMAL
HYALINE CASTS UR QL AUTO: ABNORMAL /LPF
IMM GRANULOCYTES # BLD AUTO: 0.04 10*3/MM3 (ref 0–0.05)
IMM GRANULOCYTES NFR BLD AUTO: 0.4 % (ref 0–0.5)
KETONES UR QL STRIP: ABNORMAL
LEUKOCYTE ESTERASE UR QL STRIP.AUTO: ABNORMAL
LIPASE SERPL-CCNC: 27 U/L (ref 13–60)
LYMPHOCYTES # BLD AUTO: 1.03 10*3/MM3 (ref 0.7–3.1)
LYMPHOCYTES NFR BLD AUTO: 9.7 % (ref 19.6–45.3)
MCH RBC QN AUTO: 30.2 PG (ref 26.6–33)
MCHC RBC AUTO-ENTMCNC: 32.6 G/DL (ref 31.5–35.7)
MCV RBC AUTO: 92.8 FL (ref 79–97)
MONOCYTES # BLD AUTO: 0.96 10*3/MM3 (ref 0.1–0.9)
MONOCYTES NFR BLD AUTO: 9.1 % (ref 5–12)
NEUTROPHILS NFR BLD AUTO: 77.5 % (ref 42.7–76)
NEUTROPHILS NFR BLD AUTO: 8.21 10*3/MM3 (ref 1.7–7)
NITRITE UR QL STRIP: NEGATIVE
NRBC BLD AUTO-RTO: 0 /100 WBC (ref 0–0.2)
PH UR STRIP.AUTO: 5.5 [PH] (ref 5–8)
PLATELET # BLD AUTO: 179 10*3/MM3 (ref 140–450)
PMV BLD AUTO: 9.1 FL (ref 6–12)
POTASSIUM SERPL-SCNC: 4.4 MMOL/L (ref 3.5–5.2)
POTASSIUM SERPL-SCNC: 4.7 MMOL/L (ref 3.5–5.2)
PROT SERPL-MCNC: 5.9 G/DL (ref 6–8.5)
PROT UR QL STRIP: ABNORMAL
RBC # BLD AUTO: 5.16 10*6/MM3 (ref 4.14–5.8)
RBC # UR STRIP: ABNORMAL /HPF
REF LAB TEST METHOD: ABNORMAL
SODIUM SERPL-SCNC: 138 MMOL/L (ref 136–145)
SODIUM SERPL-SCNC: 139 MMOL/L (ref 136–145)
SP GR UR STRIP: 1.02 (ref 1–1.03)
SQUAMOUS #/AREA URNS HPF: ABNORMAL /HPF
UROBILINOGEN UR QL STRIP: ABNORMAL
WBC # UR STRIP: ABNORMAL /HPF
WBC NRBC COR # BLD AUTO: 10.59 10*3/MM3 (ref 3.4–10.8)
WHOLE BLOOD HOLD COAG: NORMAL
WHOLE BLOOD HOLD SPECIMEN: NORMAL

## 2025-07-23 PROCEDURE — 63710000001 REVEFENACIN 175 MCG/3ML SOLUTION: Performed by: HOSPITALIST

## 2025-07-23 PROCEDURE — 99285 EMERGENCY DEPT VISIT HI MDM: CPT

## 2025-07-23 PROCEDURE — 25010000002 AZITHROMYCIN PER 500 MG: Performed by: HOSPITALIST

## 2025-07-23 PROCEDURE — 83690 ASSAY OF LIPASE: CPT | Performed by: EMERGENCY MEDICINE

## 2025-07-23 PROCEDURE — 85025 COMPLETE CBC W/AUTO DIFF WBC: CPT | Performed by: EMERGENCY MEDICINE

## 2025-07-23 PROCEDURE — 80053 COMPREHEN METABOLIC PANEL: CPT | Performed by: PHYSICIAN ASSISTANT

## 2025-07-23 PROCEDURE — 25810000003 SODIUM CHLORIDE 0.9 % SOLUTION: Performed by: EMERGENCY MEDICINE

## 2025-07-23 PROCEDURE — 99239 HOSP IP/OBS DSCHRG MGMT >30: CPT | Performed by: PHYSICIAN ASSISTANT

## 2025-07-23 PROCEDURE — 25010000002 DICYCLOMINE PER 20 MG: Performed by: EMERGENCY MEDICINE

## 2025-07-23 PROCEDURE — 81001 URINALYSIS AUTO W/SCOPE: CPT | Performed by: EMERGENCY MEDICINE

## 2025-07-23 PROCEDURE — 25010000002 ONDANSETRON PER 1 MG: Performed by: EMERGENCY MEDICINE

## 2025-07-23 PROCEDURE — 96372 THER/PROPH/DIAG INJ SC/IM: CPT

## 2025-07-23 PROCEDURE — 94799 UNLISTED PULMONARY SVC/PX: CPT

## 2025-07-23 PROCEDURE — G0378 HOSPITAL OBSERVATION PER HR: HCPCS

## 2025-07-23 PROCEDURE — 25810000003 SODIUM CHLORIDE 0.9 % SOLUTION 250 ML FLEX CONT: Performed by: HOSPITALIST

## 2025-07-23 PROCEDURE — 94664 DEMO&/EVAL PT USE INHALER: CPT

## 2025-07-23 PROCEDURE — 96375 TX/PRO/DX INJ NEW DRUG ADDON: CPT

## 2025-07-23 PROCEDURE — 83605 ASSAY OF LACTIC ACID: CPT | Performed by: EMERGENCY MEDICINE

## 2025-07-23 RX ORDER — SACUBITRIL AND VALSARTAN 24; 26 MG/1; MG/1
0.5 TABLET, FILM COATED ORAL EVERY 12 HOURS SCHEDULED
Qty: 90 TABLET | Refills: 3 | Status: SHIPPED | OUTPATIENT
Start: 2025-07-23

## 2025-07-23 RX ORDER — ONDANSETRON 2 MG/ML
4 INJECTION INTRAMUSCULAR; INTRAVENOUS ONCE
Status: COMPLETED | OUTPATIENT
Start: 2025-07-23 | End: 2025-07-23

## 2025-07-23 RX ORDER — SODIUM CHLORIDE 9 MG/ML
10 INJECTION, SOLUTION INTRAMUSCULAR; INTRAVENOUS; SUBCUTANEOUS AS NEEDED
Status: DISCONTINUED | OUTPATIENT
Start: 2025-07-23 | End: 2025-07-25 | Stop reason: HOSPADM

## 2025-07-23 RX ORDER — DICYCLOMINE HYDROCHLORIDE 10 MG/ML
20 INJECTION INTRAMUSCULAR ONCE
Status: COMPLETED | OUTPATIENT
Start: 2025-07-23 | End: 2025-07-23

## 2025-07-23 RX ADMIN — LEVOTHYROXINE SODIUM 75 MCG: 0.07 TABLET ORAL at 05:00

## 2025-07-23 RX ADMIN — REVEFENACIN 175 MCG: 175 SOLUTION RESPIRATORY (INHALATION) at 10:17

## 2025-07-23 RX ADMIN — ARFORMOTEROL TARTRATE 15 MCG: 15 SOLUTION RESPIRATORY (INHALATION) at 10:18

## 2025-07-23 RX ADMIN — DICYCLOMINE HYDROCHLORIDE 20 MG: 10 INJECTION, SOLUTION INTRAMUSCULAR at 22:46

## 2025-07-23 RX ADMIN — AZITHROMYCIN DIHYDRATE 500 MG: 500 INJECTION, POWDER, LYOPHILIZED, FOR SOLUTION INTRAVENOUS at 08:00

## 2025-07-23 RX ADMIN — CLOPIDOGREL BISULFATE 75 MG: 75 TABLET, FILM COATED ORAL at 08:00

## 2025-07-23 RX ADMIN — SODIUM CHLORIDE 1000 ML: 9 INJECTION, SOLUTION INTRAVENOUS at 21:55

## 2025-07-23 RX ADMIN — ONDANSETRON 4 MG: 2 INJECTION, SOLUTION INTRAMUSCULAR; INTRAVENOUS at 21:53

## 2025-07-23 RX ADMIN — PANTOPRAZOLE SODIUM 40 MG: 40 TABLET, DELAYED RELEASE ORAL at 05:00

## 2025-07-23 RX ADMIN — CALCIUM CARBONATE (ANTACID) CHEW TAB 500 MG 2 TABLET: 500 CHEW TAB at 09:52

## 2025-07-23 NOTE — DISCHARGE SUMMARY
[facility]  DISCHARGE SUMMARY    Patient Name: Gee Whitney  : 1950  MRN: [id number]    Date of Admission: 2025  5:59 AM  Date of Discharge:  2025  Primary Care Physician: Pramod Blevins MD    Consults       No orders found from 2025 to 2025.            Hospital Course     Presenting Problem: Diarrhea    Active Hospital Problems    Diagnosis  POA    **Diarrhea [R19.7]  Yes    Chronic HFrEF (heart failure with reduced ejection fraction) [I50.22]  Yes    HFrEF (heart failure with reduced ejection fraction) [I50.20]  Yes    Hypothyroidism [E03.9]  Yes    Coronary artery disease [I25.10]  Yes    Essential hypertension [I10]  Yes    Dyslipidemia [E78.5]  Yes      Resolved Hospital Problems   No resolved problems to display.          Hospital Course:  Gee Whitney is a 74 y.o. male with PMHx of CAD (PCI/KARSTEN to LAD), chronic HFrEF, hypothyroidism, HTN, HLP who presents to [facility] ED with 3 days of nausea/chills/diarrhea. Profuse diarrhea, at least 10/day, non-bloody. Day of admission he had episode of diarrhea/nausea, followed by syncope. In ED, found to have enteroaggregative E. Coli and diarrheal illness on CT. He was started on IVF and antibiotics and admitted to hospital medicine for further management. His diarrhea resolved shortly after admission with ability to tolerate regular diet.  No fever, chills.  He received 3rd dose of IV antibiotics on  and it was felt that he could DC home.  He was noted to be orthostatic even after fluid resuscitation.  He was advised on proper postural changes and will keep an eye on BP readings at home.     Enteroaggregative E. Coli/ Diarrhea  Syncope 2/2 volume depletion\  DEB/CKD, improving  -Continue azithromycin, 3rd dose given on day of DC. Blood Cx negative at 24HRS.  -Advanced to regular diet , tolerating.    -Renal function has normalized.        Hyperkalemia (5.3)  -Normalized after IVF  and increased oral intake.     CAD s/p KARSTEN 6/10  Chronic HFrEF, EF 31-35%  -Continue asa/plavix  -Continue high intensity statin  -Follow up with Dr. Joseph as previously scheduled.  May continue cardiac rehab on Friday.  -Resume entresto at half dose (Dr. Joseph's NP, RN have been notified) and continue diuretics PRN.Monitor BP readings particularly from sitting to standing.     Hypothyroidism  -TSH 2.92 in June 2025            Discharge Follow Up Recommendations for outpatient labs/diagnostics:   1.  Keep follow up appt with Dr. Joseph as previously scheduled.   2.  May resume cardiac rehab on Friday    Day of Discharge     HPI:   Resting in bed, awakens easily.  No further diarrhea since presentation.  No BM yet but patient is having flatulence.  Vitals stable.  Patient is mildly orthostatic but the difference is improving with increased oral intake.    Review of Systems   Constitutional:  Negative for appetite change, chills, fever and unexpected weight change.   Respiratory:  Negative for chest tightness, shortness of breath and wheezing.    Cardiovascular:  Negative for chest pain, palpitations and leg swelling.   Gastrointestinal:  Negative for abdominal pain, diarrhea, nausea and vomiting.   Genitourinary:  Negative for dysuria.   Neurological:  Positive for light-headedness.   All other systems reviewed and are negative.        Vital Signs:   Temp:  [97.5 °F (36.4 °C)-99.1 °F (37.3 °C)] 97.5 °F (36.4 °C)  Heart Rate:  [64-90] 77  Resp:  [16-18] 16  BP: ()/(39-73) 112/66      Physical Exam:  Constitutional: No acute distress, awake, alert  HENT: NCAT, mucous membranes moist  Respiratory: Clear to auscultation bilaterally, respiratory effort normal   Cardiovascular: RRR, no murmurs, rubs, or gallops  Gastrointestinal: Positive bowel sounds, soft, nontender, nondistended  Musculoskeletal: No bilateral ankle edema  Psychiatric: Appropriate affect, cooperative  Neurologic: Oriented x 3, strength symmetric  in all extremities, Cranial Nerves grossly intact to confrontation, speech clear  Skin: No rashes    Pertinent  and/or Most Recent Results     LAB RESULTS:      Lab 07/22/25  0618 07/21/25  2111 07/21/25  0611   WBC 10.50  --  8.69   HEMOGLOBIN 12.3* 13.6 15.1   HEMATOCRIT 36.9* 40.4 45.1   PLATELETS 140  --  169   NEUTROS ABS  --   --  7.20*   IMMATURE GRANS (ABS)  --   --  0.03   LYMPHS ABS  --   --  0.78   MONOS ABS  --   --  0.57   EOS ABS  --   --  0.10   MCV 92.3  --  91.5   LACTATE  --   --  1.3         Lab 07/23/25  0526 07/22/25  0617 07/21/25  1458 07/21/25  0611   SODIUM 138 134*  --  136   POTASSIUM 4.4 4.6 5.3* 5.3*   CHLORIDE 112* 109*  --  106   CO2 19.4* 17.0*  --  19.7*   ANION GAP 6.6 8.0  --  10.3   BUN 21.5 29.2*  --  32.6*   CREATININE 1.26 1.37*  --  1.59*   EGFR 59.1* 53.5*  --  44.7*   GLUCOSE 94 92  --  144*   CALCIUM 8.4* 7.9*  --  9.1         Lab 07/22/25  0617 07/21/25  0611   TOTAL PROTEIN 4.6* 5.8*   ALBUMIN 3.1* 4.0   GLOBULIN 1.5 1.8   ALT (SGPT) 7 16   AST (SGOT) 11 20   BILIRUBIN 0.5 0.4   ALK PHOS 54 65   LIPASE  --  119*         Lab 07/21/25  1214 07/21/25  0907 07/21/25  0711   HSTROP T 32* 33* 21                 Brief Urine Lab Results  (Last result in the past 365 days)        Color   Clarity   Blood   Leuk Est   Nitrite   Protein   CREAT   Urine HCG        07/22/25 1216 Yellow   Clear   Negative   Negative   Negative   Negative                 Microbiology Results (last 10 days)       Procedure Component Value - Date/Time    Blood Culture - Blood, Hand, Left [[id number]]  (Normal) Collected: 07/22/25 0140    Lab Status: Preliminary result Specimen: Blood from Hand, Left Updated: 07/23/25 0345     Blood Culture No growth at 24 hours    Narrative:      Less than seven (7) mL's of blood was collected.  Insufficient quantity may yield false negative results.    Blood Culture - Blood, Arm, Left [[id number]]  (Normal) Collected: 07/22/25 0135    Lab Status: Preliminary result  Specimen: Blood from Arm, Left Updated: 07/23/25 0345     Blood Culture No growth at 24 hours    Narrative:      Less than seven (7) mL's of blood was collected.  Insufficient quantity may yield false negative results.    Gastrointestinal Panel, PCR - Stool, Per Rectum [[id number]]  (Abnormal) Collected: 07/21/25 0801    Lab Status: Final result Specimen: Stool from Per Rectum Updated: 07/21/25 1026     Campylobacter Not Detected     Plesiomonas shigelloides Not Detected     Salmonella Not Detected     Vibrio Not Detected     Vibrio cholerae Not Detected     Yersinia enterocolitica Not Detected     Enteroaggregative E. coli (EAEC) Detected     Enteropathogenic E. coli (EPEC) Not Detected     Enterotoxigenic E. coli (ETEC) lt/st Not Detected     Shiga-like toxin-producing E. coli (STEC) stx1/stx2 Not Detected     Shigella/Enteroinvasive E. coli (EIEC) Not Detected     Cryptosporidium Not Detected     Cyclospora cayetanensis Not Detected     Entamoeba histolytica Not Detected     Giardia lamblia Not Detected     Adenovirus F40/41 Not Detected     Astrovirus Not Detected     Norovirus GI/GII Not Detected     Rotavirus A Not Detected     Sapovirus (I, II, IV or V) Not Detected            CT Abdomen Pelvis With Contrast  Result Date: 7/21/2025  CT ABDOMEN PELVIS W CONTRAST Date of Exam: 7/21/2025 6:50 AM EDT Indication: [facility] abdominal pain, hypotension, diarrhea. Comparison: CT abdomen and pelvis 3/14/2023 Technique: Axial CT images were obtained of the abdomen and pelvis following the uneventful intravenous administration of 85 mL Isovue-300. Reconstructed coronal and sagittal images were also obtained. Automated exposure control and iterative construction methods were used. Findings: Unremarkable appearance of the lower thorax. Unremarkable appearance of the liver, gallbladder, bile ducts, spleen, pancreas, and adrenal glands. Redemonstration of multiple bilateral exophytic simple renal cysts. Normal appearance of the  ureters and decompressed bladder. The prostate is enlarged. Normal appearance of the seminal vesicles. There is fluid/unformed stool within the colon compatible with diarrheal state without apparent inflammatory change of the colon. No bowel obstruction. No definite  inflammatory change of the GI tract. No abdominopelvic free fluid. No pneumoperitoneum. No abdominopelvic fat stranding. There is atherosclerosis of the aorta and iliac branches with otherwise grossly unremarkable appearance of the vasculature. No bulky  or suspicious abdominopelvic lymph nodes. Unremarkable appearance of the body wall soft tissues. No acute or suspicious bony findings.     Impression: No acute abdominopelvic findings. Fluid/unformed stool within the colon compatible with diarrheal state. Electronically Signed: Cali Singh MD  7/21/2025 7:16 AM EDT  Workstation ID: [id number]              Results for orders placed during the hospital encounter of 05/09/25    Adult Transthoracic Echo Complete W/ Cont if Necessary Per Protocol 05/09/2025 12:04 PM    Interpretation Summary    Left ventricular systolic function is moderately decreased.Left ventricular ejection fraction appears to be 31 - 35%.    The following left ventricular wall segments are dyskinetic: mid anteroseptal. The following left ventricular wall segments are akinetic: mid anterior, apical anterior, apical septal and apex. The following left ventricular wall segments are aneurysmal: mid anteroseptal.    No hemodynamically significant valvular heart      Plan for Follow-up of Pending Labs/Results: Blood Cx are negative at 24 HRS.    Pending Labs       Order Current Status    Blood Culture - Blood, Arm, Left Preliminary result    Blood Culture - Blood, Hand, Left Preliminary result          Discharge Details        Discharge Medications        Changes to Medications        Instructions Start Date   sacubitril-valsartan 24-26 MG tablet  Commonly known as: ENTRESTO  What changed:  how much to take   0.5 tablets, Oral, Every 12 Hours Scheduled             Continue These Medications        Instructions Start Date   albuterol sulfate  (90 Base) MCG/ACT inhaler  Commonly known as: PROVENTIL HFA;VENTOLIN HFA;PROAIR HFA   2 puffs, Inhalation, Every 4 Hours PRN      aspirin 81 MG EC tablet   81 mg, Oral, Daily      atorvastatin 40 MG tablet  Commonly known as: LIPITOR   40 mg, Nightly      carvedilol 6.25 MG tablet  Commonly known as: COREG   6.25 mg, Oral, 2 Times Daily      clopidogrel 75 MG tablet  Commonly known as: PLAVIX   75 mg, Oral, Daily      Fish Oil 1200 MG capsule delayed-release capsule   1,200 mg, Daily      furosemide 20 MG tablet  Commonly known as: LASIX   20 mg, As Needed      ICAPS AREDS 2 PO   2 tablets, Daily      ipratropium 0.06 % nasal spray  Commonly known as: ATROVENT   2 sprays, 3 Times Daily      levothyroxine 75 MCG tablet  Commonly known as: SYNTHROID, LEVOTHROID   75 mcg, Daily      MIRALAX PO   Every Other Day      nitroglycerin 0.4 MG SL tablet  Commonly known as: NITROSTAT   0.4 mg, Every 5 Minutes PRN      spironolactone 25 MG tablet  Commonly known as: ALDACTONE   25 mg, Oral, Daily      Stiolto Respimat 2.5-2.5 MCG/ACT aerosol solution inhaler  Generic drug: tiotropium bromide-olodaterol   2 puffs, Inhalation, Daily PRN      terazosin 1 MG capsule  Commonly known as: HYTRIN   1 mg, Oral, Nightly      traZODone 50 MG tablet  Commonly known as: DESYREL   1 tablet, Nightly      vitamin D 1.25 MG (17455 UT) capsule capsule  Commonly known as: ERGOCALCIFEROL   50,000 Units, Weekly               No Known Allergies      Discharge Disposition: Home in stable condition      Diet:  Diet Order   Procedures    Diet: Cardiac; Healthy Heart (2-3 Na+); Fluid Consistency: Thin (IDDSI 0)            Activity: As tolerated.  May resume cardia rehab on Friday.      Restrictions or Other Recommendations:  Monitor BP readings from Sitting to Standing.  Wait at least 1 minute  between postural changes before taking off/walking.  Take BP readings with you to appt with Dr. Joseph.       CODE STATUS:    Code Status and Medical Interventions: CPR (Attempt to Resuscitate); Full Support   Ordered at: 07/21/25 1908     Code Status (Patient has no pulse and is not breathing):    CPR (Attempt to Resuscitate)     Medical Interventions (Patient has pulse or is breathing):    Full Support       Future Appointments   Date Time Provider Department Center   7/23/2025  9:00 AM [facility]  [facility]   7/25/2025  9:00 AM [facility]  [facility]   7/28/2025  9:00 AM [facility]   7/30/2025  9:00 AM [facility]   8/1/2025  9:00 AM [facility]  [facility]   8/4/2025  9:00 AM [facility]  [facility]   8/6/2025  9:00 AM [facility]  [facility]   8/8/2025  9:00 AM [facility]  [facility]   8/11/2025  9:00 AM [facility]  [facility]   8/13/2025  9:00 AM [facility]  [facility]   8/15/2025  9:00 AM [facility]  [facility]   8/18/2025  9:00 AM [facility]  [facility]   8/20/2025  9:00 AM [facility]  [facility]   8/22/2025  9:00 AM [facility]  [facility]   8/25/2025  9:00 AM [facility]  [facility]   8/27/2025  9:00 AM [facility]  [facility]   8/29/2025  9:00 AM [facility]  [facility]   9/3/2025  9:00 AM [facility]  [facility]   9/5/2025  9:00 AM [facility]  [facility]   9/8/2025  9:00 AM [facility]  [facility]   9/10/2025  9:00 AM [facility]  [facility]   9/12/2025  9:00 AM [facility]  [facility]   9/12/2025 10:00 AM [facility] OP ECHO CART RM 3  [facility] NIV   [facility]   9/15/2025  9:00 AM Shaun Joseph MD [facility]   9/15/2025  9:00 AM [facility]  [facility]   9/17/2025  9:00 AM [facility]  [facility]   9/19/2025  8:00 AM [facility]  [facility]   9/19/2025  9:30 AM RAD TECH [facility]   9/19/2025  9:45 AM Gab Brooke MD [facility]   9/22/2025  9:00 AM [facility]  [facility]   9/24/2025  9:00 AM [facility]  [facility]   9/26/2025  9:00 AM [facility]  [facility]   9/29/2025  9:00 AM [facility]  [facility]   10/1/2025  9:00 AM [facility]  [facility]   10/3/2025  9:00 AM [facility]  [facility]   10/6/2025  9:00 AM [facility]  [facility]   10/8/2025  9:00 AM [facility]  [facility]   10/10/2025  9:00 AM [facility]  [facility]   10/13/2025  9:00 AM [facility]  [facility]   10/15/2025  9:00 AM [facility]  [facility]   10/17/2025  9:00 AM [facility]  [facility]                 Miriam Marina PA-C  07/23/25      Time Spent on Discharge:  I spent  35  minutes on this discharge activity which included: face-to-face encounter with the patient, reviewing the data in the system, coordination of the care with the nursing staff as well as consultants, documentation, and entering orders.

## 2025-07-23 NOTE — PLAN OF CARE
Problem: Adult Inpatient Plan of Care  Goal: Plan of Care Review  Outcome: Progressing  Goal: Patient-Specific Goal (Individualized)  Outcome: Progressing  Goal: Absence of Hospital-Acquired Illness or Injury  Outcome: Progressing  Intervention: Identify and Manage Fall Risk  Recent Flowsheet Documentation  Taken 7/23/2025 0200 by Ivonne Womack RN  Safety Promotion/Fall Prevention: safety round/check completed  Taken 7/23/2025 0000 by Ivonne Womack RN  Safety Promotion/Fall Prevention: safety round/check completed  Taken 7/22/2025 2200 by Ivonne Womack RN  Safety Promotion/Fall Prevention: safety round/check completed  Intervention: Prevent Skin Injury  Recent Flowsheet Documentation  Taken 7/23/2025 0200 by Ivonne Womack RN  Body Position: position changed independently  Taken 7/23/2025 0000 by Ivonne Womack RN  Body Position: position changed independently  Taken 7/22/2025 2200 by Iovnne Womack RN  Body Position: position changed independently  Intervention: Prevent Infection  Recent Flowsheet Documentation  Taken 7/23/2025 0200 by Ivonne Womack RN  Infection Prevention: environmental surveillance performed  Taken 7/23/2025 0000 by Ivonne Womack RN  Infection Prevention: environmental surveillance performed  Taken 7/22/2025 2200 by Ivonne Womack RN  Infection Prevention: environmental surveillance performed  Goal: Optimal Comfort and Wellbeing  Outcome: Progressing  Intervention: Provide Person-Centered Care  Recent Flowsheet Documentation  Taken 7/23/2025 0200 by Ivonne Womack RN  Trust Relationship/Rapport:   choices provided   emotional support provided   care explained  Taken 7/23/2025 0000 by Ivonne Womack RN  Trust Relationship/Rapport:   emotional support provided   choices provided   care explained  Taken 7/22/2025 2000 by Ivonne Womack RN Trust  Relationship/Rapport:   care explained   choices provided   emotional support provided  Goal: Readiness for Transition of Care  Outcome: Progressing     Problem: Comorbidity Management  Goal: Blood Pressure in Desired Range  Outcome: Progressing  Intervention: Maintain Blood Pressure Management  Recent Flowsheet Documentation  Taken 7/23/2025 0200 by Ivonne Womack RN  Medication Review/Management: medications reviewed  Taken 7/23/2025 0000 by Ivonne Womack RN  Medication Review/Management: medications reviewed  Taken 7/22/2025 2200 by Ivonne Womack RN  Medication Review/Management: medications reviewed  Taken 7/22/2025 2100 by Ivonne Womack RN  Medication Review/Management: medications reviewed     Problem: Infection  Goal: Absence of Infection Signs and Symptoms  Outcome: Progressing     Problem: Sepsis/Septic Shock  Goal: Optimal Coping  Outcome: Progressing  Goal: Absence of Bleeding  Outcome: Progressing  Goal: Blood Glucose Level Within Target Range  Outcome: Progressing  Goal: Absence of Infection Signs and Symptoms  Outcome: Progressing  Intervention: Initiate Sepsis Management  Recent Flowsheet Documentation  Taken 7/23/2025 0200 by Ivonne Womack RN  Infection Prevention: environmental surveillance performed  Taken 7/23/2025 0000 by Ivonne Womack RN  Infection Prevention: environmental surveillance performed  Taken 7/22/2025 2200 by Ivonne Womack RN  Infection Prevention: environmental surveillance performed  Intervention: Promote Recovery  Recent Flowsheet Documentation  Taken 7/23/2025 0200 by Ivonne Womack RN  Activity Management: activity minimized  Taken 7/23/2025 0000 by Ivonne Womack RN  Activity Management: activity minimized  Taken 7/22/2025 2200 by Ivonne Womack RN  Activity Management: activity minimized  Goal: Optimal Nutrition Delivery  Outcome: Progressing   Goal  Outcome Evaluation:      Pt is alert and oriented. No complaints of pain or dizziness. Plan for 2nd dose of IV abx on 7/23. Call light within reach.

## 2025-07-23 NOTE — NURSING NOTE
Discharge instructions given. Pt expressed thru teach back method what meds to take, what side effects to watch for and when to follow up with his physicians. Wife here to drive him home

## 2025-07-24 ENCOUNTER — HOSPITAL ENCOUNTER (OUTPATIENT)
Facility: HOSPITAL | Age: 75
Setting detail: OBSERVATION
LOS: 1 days | Discharge: HOME | End: 2025-07-25
Attending: EMERGENCY MEDICINE | Admitting: FAMILY MEDICINE
Payer: MEDICARE

## 2025-07-24 DIAGNOSIS — R11.2 INTRACTABLE NAUSEA AND VOMITING: Primary | ICD-10-CM

## 2025-07-24 DIAGNOSIS — A04.4 E COLI ENTERITIS: ICD-10-CM

## 2025-07-24 DIAGNOSIS — E86.0 DEHYDRATION: ICD-10-CM

## 2025-07-24 PROBLEM — K92.1 HEMATOCHEZIA: Status: ACTIVE | Noted: 2025-07-24

## 2025-07-24 PROBLEM — K52.9 ENTERITIS: Status: ACTIVE | Noted: 2025-07-24

## 2025-07-24 LAB
ABO GROUP BLD: NORMAL
ABO GROUP BLD: NORMAL
ALBUMIN SERPL-MCNC: 3.2 G/DL (ref 3.5–5.2)
ALBUMIN/GLOB SERPL: 1.8 G/DL
ALP SERPL-CCNC: 53 U/L (ref 39–117)
ALT SERPL W P-5'-P-CCNC: 9 U/L (ref 1–41)
ANION GAP SERPL CALCULATED.3IONS-SCNC: 10.5 MMOL/L (ref 5–15)
AST SERPL-CCNC: 14 U/L (ref 1–40)
BASOPHILS # BLD AUTO: 0.01 10*3/MM3 (ref 0–0.2)
BASOPHILS NFR BLD AUTO: 0.1 % (ref 0–1.5)
BILIRUB SERPL-MCNC: 0.3 MG/DL (ref 0–1.2)
BLD GP AB SCN SERPL QL: NEGATIVE
BUN SERPL-MCNC: 30.5 MG/DL (ref 8–23)
BUN/CREAT SERPL: 16.7 (ref 7–25)
CALCIUM SPEC-SCNC: 8.4 MG/DL (ref 8.6–10.5)
CHLORIDE SERPL-SCNC: 112 MMOL/L (ref 98–107)
CO2 SERPL-SCNC: 16.5 MMOL/L (ref 22–29)
CREAT SERPL-MCNC: 1.83 MG/DL (ref 0.76–1.27)
DEPRECATED RDW RBC AUTO: 44.9 FL (ref 37–54)
EGFRCR SERPLBLD CKD-EPI 2021: 37.8 ML/MIN/1.73
EOSINOPHIL # BLD AUTO: 0.47 10*3/MM3 (ref 0–0.4)
EOSINOPHIL NFR BLD AUTO: 4.5 % (ref 0.3–6.2)
ERYTHROCYTE [DISTWIDTH] IN BLOOD BY AUTOMATED COUNT: 13.4 % (ref 12.3–15.4)
GLOBULIN UR ELPH-MCNC: 1.8 GM/DL
GLUCOSE SERPL-MCNC: 100 MG/DL (ref 65–99)
HCT VFR BLD AUTO: 40.7 % (ref 37.5–51)
HGB BLD-MCNC: 13.5 G/DL (ref 13–17.7)
IMM GRANULOCYTES # BLD AUTO: 0.05 10*3/MM3 (ref 0–0.05)
IMM GRANULOCYTES NFR BLD AUTO: 0.5 % (ref 0–0.5)
LIPASE SERPL-CCNC: 19 U/L (ref 13–60)
LYMPHOCYTES # BLD AUTO: 1.21 10*3/MM3 (ref 0.7–3.1)
LYMPHOCYTES NFR BLD AUTO: 11.6 % (ref 19.6–45.3)
MAGNESIUM SERPL-MCNC: 1.5 MG/DL (ref 1.6–2.4)
MAGNESIUM SERPL-MCNC: 2.4 MG/DL (ref 1.6–2.4)
MCH RBC QN AUTO: 30.3 PG (ref 26.6–33)
MCHC RBC AUTO-ENTMCNC: 33.2 G/DL (ref 31.5–35.7)
MCV RBC AUTO: 91.5 FL (ref 79–97)
MONOCYTES # BLD AUTO: 1.61 10*3/MM3 (ref 0.1–0.9)
MONOCYTES NFR BLD AUTO: 15.4 % (ref 5–12)
NEUTROPHILS NFR BLD AUTO: 67.9 % (ref 42.7–76)
NEUTROPHILS NFR BLD AUTO: 7.09 10*3/MM3 (ref 1.7–7)
NRBC BLD AUTO-RTO: 0 /100 WBC (ref 0–0.2)
PLATELET # BLD AUTO: 172 10*3/MM3 (ref 140–450)
PMV BLD AUTO: 9.2 FL (ref 6–12)
POTASSIUM SERPL-SCNC: 4.5 MMOL/L (ref 3.5–5.2)
PROT SERPL-MCNC: 5 G/DL (ref 6–8.5)
QT INTERVAL: 378 MS
QTC INTERVAL: 427 MS
RBC # BLD AUTO: 4.45 10*6/MM3 (ref 4.14–5.8)
RH BLD: POSITIVE
RH BLD: POSITIVE
SODIUM SERPL-SCNC: 139 MMOL/L (ref 136–145)
T&S EXPIRATION DATE: NORMAL
WBC NRBC COR # BLD AUTO: 10.44 10*3/MM3 (ref 3.4–10.8)

## 2025-07-24 PROCEDURE — 86850 RBC ANTIBODY SCREEN: CPT | Performed by: INTERNAL MEDICINE

## 2025-07-24 PROCEDURE — 99223 1ST HOSP IP/OBS HIGH 75: CPT | Performed by: INTERNAL MEDICINE

## 2025-07-24 PROCEDURE — 93010 ELECTROCARDIOGRAM REPORT: CPT | Performed by: INTERNAL MEDICINE

## 2025-07-24 PROCEDURE — G0378 HOSPITAL OBSERVATION PER HR: HCPCS

## 2025-07-24 PROCEDURE — 83690 ASSAY OF LIPASE: CPT | Performed by: INTERNAL MEDICINE

## 2025-07-24 PROCEDURE — 94799 UNLISTED PULMONARY SVC/PX: CPT

## 2025-07-24 PROCEDURE — 94664 DEMO&/EVAL PT USE INHALER: CPT

## 2025-07-24 PROCEDURE — 85025 COMPLETE CBC W/AUTO DIFF WBC: CPT | Performed by: INTERNAL MEDICINE

## 2025-07-24 PROCEDURE — 96366 THER/PROPH/DIAG IV INF ADDON: CPT

## 2025-07-24 PROCEDURE — 25010000002 MAGNESIUM SULFATE 2 GM/50ML SOLUTION: Performed by: INTERNAL MEDICINE

## 2025-07-24 PROCEDURE — 86900 BLOOD TYPING SEROLOGIC ABO: CPT | Performed by: INTERNAL MEDICINE

## 2025-07-24 PROCEDURE — 86901 BLOOD TYPING SEROLOGIC RH(D): CPT | Performed by: INTERNAL MEDICINE

## 2025-07-24 PROCEDURE — 99285 EMERGENCY DEPT VISIT HI MDM: CPT

## 2025-07-24 PROCEDURE — 80053 COMPREHEN METABOLIC PANEL: CPT | Performed by: INTERNAL MEDICINE

## 2025-07-24 PROCEDURE — 86901 BLOOD TYPING SEROLOGIC RH(D): CPT

## 2025-07-24 PROCEDURE — 96375 TX/PRO/DX INJ NEW DRUG ADDON: CPT

## 2025-07-24 PROCEDURE — 36415 COLL VENOUS BLD VENIPUNCTURE: CPT

## 2025-07-24 PROCEDURE — 25010000002 METOCLOPRAMIDE PER 10 MG: Performed by: EMERGENCY MEDICINE

## 2025-07-24 PROCEDURE — 93005 ELECTROCARDIOGRAM TRACING: CPT | Performed by: INTERNAL MEDICINE

## 2025-07-24 PROCEDURE — 97161 PT EVAL LOW COMPLEX 20 MIN: CPT | Performed by: PHYSICAL THERAPIST

## 2025-07-24 PROCEDURE — 25810000003 SODIUM CHLORIDE 0.9 % SOLUTION: Performed by: INTERNAL MEDICINE

## 2025-07-24 PROCEDURE — 83735 ASSAY OF MAGNESIUM: CPT | Performed by: INTERNAL MEDICINE

## 2025-07-24 PROCEDURE — 96365 THER/PROPH/DIAG IV INF INIT: CPT

## 2025-07-24 PROCEDURE — 97165 OT EVAL LOW COMPLEX 30 MIN: CPT

## 2025-07-24 PROCEDURE — 86900 BLOOD TYPING SEROLOGIC ABO: CPT

## 2025-07-24 RX ORDER — SACUBITRIL AND VALSARTAN 24; 26 MG/1; MG/1
0.5 TABLET, FILM COATED ORAL EVERY 12 HOURS SCHEDULED
Status: DISCONTINUED | OUTPATIENT
Start: 2025-07-24 | End: 2025-07-25 | Stop reason: HOSPADM

## 2025-07-24 RX ORDER — MAGNESIUM SULFATE HEPTAHYDRATE 40 MG/ML
2 INJECTION, SOLUTION INTRAVENOUS
Status: DISPENSED | OUTPATIENT
Start: 2025-07-24 | End: 2025-07-24

## 2025-07-24 RX ORDER — SODIUM CHLORIDE 0.9 % (FLUSH) 0.9 %
10 SYRINGE (ML) INJECTION AS NEEDED
Status: DISCONTINUED | OUTPATIENT
Start: 2025-07-24 | End: 2025-07-25 | Stop reason: HOSPADM

## 2025-07-24 RX ORDER — SODIUM CHLORIDE 9 MG/ML
40 INJECTION, SOLUTION INTRAVENOUS AS NEEDED
Status: DISCONTINUED | OUTPATIENT
Start: 2025-07-24 | End: 2025-07-25 | Stop reason: HOSPADM

## 2025-07-24 RX ORDER — ATORVASTATIN CALCIUM 40 MG/1
80 TABLET, FILM COATED ORAL NIGHTLY
Status: DISCONTINUED | OUTPATIENT
Start: 2025-07-24 | End: 2025-07-25 | Stop reason: HOSPADM

## 2025-07-24 RX ORDER — CARVEDILOL 6.25 MG/1
6.25 TABLET ORAL 2 TIMES DAILY
Status: DISCONTINUED | OUTPATIENT
Start: 2025-07-24 | End: 2025-07-25 | Stop reason: HOSPADM

## 2025-07-24 RX ORDER — TERAZOSIN 1 MG/1
1 CAPSULE ORAL NIGHTLY
Status: DISCONTINUED | OUTPATIENT
Start: 2025-07-24 | End: 2025-07-25 | Stop reason: HOSPADM

## 2025-07-24 RX ORDER — NITROGLYCERIN 0.4 MG/1
0.4 TABLET SUBLINGUAL
Status: DISCONTINUED | OUTPATIENT
Start: 2025-07-24 | End: 2025-07-25 | Stop reason: HOSPADM

## 2025-07-24 RX ORDER — LEVOTHYROXINE SODIUM 75 UG/1
75 TABLET ORAL
Status: DISCONTINUED | OUTPATIENT
Start: 2025-07-24 | End: 2025-07-25 | Stop reason: HOSPADM

## 2025-07-24 RX ORDER — METOCLOPRAMIDE HYDROCHLORIDE 5 MG/ML
10 INJECTION INTRAMUSCULAR; INTRAVENOUS ONCE
Status: COMPLETED | OUTPATIENT
Start: 2025-07-24 | End: 2025-07-24

## 2025-07-24 RX ORDER — ASPIRIN 81 MG/1
81 TABLET ORAL DAILY
Status: DISCONTINUED | OUTPATIENT
Start: 2025-07-24 | End: 2025-07-25 | Stop reason: HOSPADM

## 2025-07-24 RX ORDER — SODIUM CHLORIDE 9 MG/ML
75 INJECTION, SOLUTION INTRAVENOUS CONTINUOUS
Status: ACTIVE | OUTPATIENT
Start: 2025-07-24 | End: 2025-07-25

## 2025-07-24 RX ORDER — ARFORMOTEROL TARTRATE 15 UG/2ML
15 SOLUTION RESPIRATORY (INHALATION)
Status: DISCONTINUED | OUTPATIENT
Start: 2025-07-24 | End: 2025-07-25 | Stop reason: HOSPADM

## 2025-07-24 RX ORDER — SPIRONOLACTONE 25 MG/1
25 TABLET ORAL DAILY
Status: DISCONTINUED | OUTPATIENT
Start: 2025-07-24 | End: 2025-07-25 | Stop reason: HOSPADM

## 2025-07-24 RX ORDER — CLOPIDOGREL BISULFATE 75 MG/1
75 TABLET ORAL DAILY
Status: DISCONTINUED | OUTPATIENT
Start: 2025-07-24 | End: 2025-07-25 | Stop reason: HOSPADM

## 2025-07-24 RX ORDER — ONDANSETRON 2 MG/ML
4 INJECTION INTRAMUSCULAR; INTRAVENOUS EVERY 6 HOURS PRN
Status: DISCONTINUED | OUTPATIENT
Start: 2025-07-24 | End: 2025-07-25 | Stop reason: HOSPADM

## 2025-07-24 RX ORDER — ALBUTEROL SULFATE 0.83 MG/ML
2.5 SOLUTION RESPIRATORY (INHALATION) EVERY 4 HOURS PRN
Status: DISCONTINUED | OUTPATIENT
Start: 2025-07-24 | End: 2025-07-25 | Stop reason: HOSPADM

## 2025-07-24 RX ORDER — FUROSEMIDE 20 MG/1
20 TABLET ORAL AS NEEDED
Status: DISCONTINUED | OUTPATIENT
Start: 2025-07-24 | End: 2025-07-25 | Stop reason: HOSPADM

## 2025-07-24 RX ORDER — SODIUM CHLORIDE 0.9 % (FLUSH) 0.9 %
10 SYRINGE (ML) INJECTION EVERY 12 HOURS SCHEDULED
Status: DISCONTINUED | OUTPATIENT
Start: 2025-07-24 | End: 2025-07-25 | Stop reason: HOSPADM

## 2025-07-24 RX ADMIN — LEVOTHYROXINE SODIUM 75 MCG: 0.07 TABLET ORAL at 12:38

## 2025-07-24 RX ADMIN — Medication 10 ML: at 21:14

## 2025-07-24 RX ADMIN — CARVEDILOL 6.25 MG: 6.25 TABLET, FILM COATED ORAL at 12:38

## 2025-07-24 RX ADMIN — TERAZOSIN HYDROCHLORIDE 1 MG: 1 CAPSULE ORAL at 21:07

## 2025-07-24 RX ADMIN — SACUBITRIL AND VALSARTAN 0.5 TABLET: 24; 26 TABLET, FILM COATED ORAL at 21:07

## 2025-07-24 RX ADMIN — ARFORMOTEROL TARTRATE 15 MCG: 15 SOLUTION RESPIRATORY (INHALATION) at 20:23

## 2025-07-24 RX ADMIN — METOCLOPRAMIDE 10 MG: 5 INJECTION, SOLUTION INTRAMUSCULAR; INTRAVENOUS at 01:15

## 2025-07-24 RX ADMIN — MAGNESIUM SULFATE HEPTAHYDRATE 2 G: 40 INJECTION, SOLUTION INTRAVENOUS at 10:46

## 2025-07-24 RX ADMIN — SODIUM CHLORIDE 75 ML/HR: 9 INJECTION, SOLUTION INTRAVENOUS at 10:42

## 2025-07-24 RX ADMIN — ATORVASTATIN CALCIUM 80 MG: 40 TABLET, FILM COATED ORAL at 21:07

## 2025-07-24 RX ADMIN — MAGNESIUM SULFATE HEPTAHYDRATE 2 G: 40 INJECTION, SOLUTION INTRAVENOUS at 12:37

## 2025-07-24 RX ADMIN — Medication 10 ML: at 10:43

## 2025-07-24 RX ADMIN — ASPIRIN 81 MG: 81 TABLET, COATED ORAL at 12:38

## 2025-07-24 RX ADMIN — CARVEDILOL 6.25 MG: 6.25 TABLET, FILM COATED ORAL at 21:07

## 2025-07-24 RX ADMIN — SACUBITRIL AND VALSARTAN 0.5 TABLET: 24; 26 TABLET, FILM COATED ORAL at 12:37

## 2025-07-24 NOTE — H&P
[facility]  HISTORY AND PHYSICAL    Patient Name: Gee Whitney  : 1950  MRN: [id number]  Primary Care Physician: Pramod Blevins MD  Date of admission: 2025      Subjective   Subjective     Chief Complaint:      HPI:  Gee Whitney is a 74 y.o. male w/ hx of CAD (PCI/KARSTEN to LAD), chronic HFrEF, hypothyroidism, HTN, HLP who was just admitted -25 with nausea, chills & diarrhea. Patient had played a day of golf outside in hot weather 2 days prior to symptom onset.wife not ill.  CT a/p was negative for acute illness. Gi pcr panel was + for enteroaggregative e.coli. initially the diarrhea was nonbloody. While in the hospital did have a bloody BM, but went >1.5 days without diarrhea and felt subjectively improved. Received zithromax x 3 doses while in hospital. As symptoms had significantly improved patient was d/c'd ridge on 25. After arriving home, developed n/v and had bloody BM, although not as bloody as was while in hospital initially. No abd pain. No dysuria. No fever. Presented back to [facility] ED due to symptoms. Was given iv fluids and antiemetics w/ ongoing symptoms so being admitted. Wbc 10.590, lactate 1.9 (normal), lipase 27 (normal). Creatinine 1.51. u/a 3-5 wbc trace bacteria.       Personal History     Past Medical History:   Diagnosis Date    Chronic kidney disease     Congenital heart disease     Coronary artery disease     Disease of thyroid gland     GERD (gastroesophageal reflux disease)     Have occually but not in regulat basis    Heart failure 2025    Hyperlipidemia     Hypertension     Myocardial infarction            Past Surgical History:   Procedure Laterality Date    BRONCHOSCOPY      CARDIAC CATHETERIZATION      CARDIAC CATHETERIZATION N/A 6/10/2025    Procedure: Left Heart Cath - Right radial access;  Surgeon: Shaun Joseph MD;  Location: [facility] INVASIVE LOCATION;  Service: Cardiovascular;   Laterality: N/A;    CARDIAC CATHETERIZATION N/A 6/10/2025    Procedure: Functional Flow [place];  Surgeon: Shaun Joseph MD;  Location:  [facility] CATH INVASIVE LOCATION;  Service: Cardiovascular;  Laterality: N/A;    CARDIAC CATHETERIZATION N/A 6/10/2025    Procedure: Stent KARSTEN coronary;  Surgeon: Shaun Joseph MD;  Location:  [facility] CATH INVASIVE LOCATION;  Service: Cardiovascular;  Laterality: N/A;    HERNIA REPAIR      TENDON RELEASE      Left thumb        Family History: family history includes Asthma in his brother; Heart attack in his mother and sister; Heart disease in his mother; Heart failure in his mother; Hypertension in his child; Mitral valve prolapse in his sister; Other (age of onset: 28) in his father; Stroke in his brother.     Social History:  reports that he quit smoking about 48 years ago. His smoking use included cigarettes. He started smoking about 58 years ago. He has a 10 pack-year smoking history. He has been exposed to tobacco smoke. He has never used smokeless tobacco. He reports current alcohol use. He reports that he does not use drugs.  Social History     Social History Narrative    Not on file       Medications:  Available home medication information reviewed.  Fish Oil, Multiple Vitamins-Minerals, Polyethylene Glycol 3350, albuterol sulfate HFA, aspirin, atorvastatin, carvedilol, clopidogrel, furosemide, ipratropium, levothyroxine, nitroglycerin, sacubitril-valsartan, spironolactone, terazosin, tiotropium bromide-olodaterol, traZODone, and vitamin D    No Known Allergies    Objective   Objective     Vital Signs:   Temp:  [97.3 °F (36.3 °C)] 97.3 °F (36.3 °C)  Heart Rate:  [] 49  Resp:  [20] 20  BP: ()/(50-83) 125/67       Physical Exam   Constitutional:Alert, oriented x 3, nontoxic appearing  Psych:Normal/appropriate affect  HEENT:NCAT, oropharynx clear  Neck: neck supple, full range of motion  Neuro: Face symmetric, speech clear, equal , moves all  extremities  Cardiac: RRR; No pretibial pitting edema  Resp: CTAB, normal effort  GI: abd soft, nontender  Skin: No extremity rash  Musculoskeletal/extremities: no cyanosis of extremities; no significant ankle edema          Result Review:  I have personally reviewed the results from the time of this admission to 7/24/2025 09:18 EDT and agree with these findings:  [x]  Laboratory list / accordion  []  Microbiology  [x]  Radiology  []  EKG/Telemetry   []  Cardiology/Vascular   []  Pathology  [x]  Old records  []  Other:  Most notable findings include: see hpi and below      LAB RESULTS:      Lab 07/23/25 2133 07/22/25 0618 07/21/25 2111 07/21/25  0611   WBC 10.59 10.50  --  8.69   HEMOGLOBIN 15.6 12.3* 13.6 15.1   HEMATOCRIT 47.9 36.9* 40.4 45.1   PLATELETS 179 140  --  169   NEUTROS ABS 8.21*  --   --  7.20*   IMMATURE GRANS (ABS) 0.04  --   --  0.03   LYMPHS ABS 1.03  --   --  0.78   MONOS ABS 0.96*  --   --  0.57   EOS ABS 0.32  --   --  0.10   MCV 92.8 92.3  --  91.5   LACTATE 1.9  --   --  1.3         Lab 07/23/25 2133 07/23/25 0526 07/22/25 0617 07/21/25  1458 07/21/25  0611   SODIUM 139 138 134*  --  136   POTASSIUM 4.7 4.4 4.6 5.3* 5.3*   CHLORIDE 110* 112* 109*  --  106   CO2 16.8* 19.4* 17.0*  --  19.7*   ANION GAP 12.2 6.6 8.0  --  10.3   BUN 23.5* 21.5 29.2*  --  32.6*   CREATININE 1.51* 1.26 1.37*  --  1.59*   EGFR 47.6* 59.1* 53.5*  --  44.7*   GLUCOSE 130* 94 92  --  144*   CALCIUM 8.8 8.4* 7.9*  --  9.1         Lab 07/23/25 2133 07/22/25 0617 07/21/25  0611   TOTAL PROTEIN 5.9* 4.6* 5.8*   ALBUMIN 3.6 3.1* 4.0   GLOBULIN 2.3 1.5 1.8   ALT (SGPT) 8 7 16   AST (SGOT) 22 11 20   BILIRUBIN 0.3 0.5 0.4   ALK PHOS 65 54 65   LIPASE 27  --  119*         Lab 07/21/25  1214 07/21/25  0907 07/21/25  0711   HSTROP T 32* 33* 21                 UA          7/22/2025    12:16 7/23/2025    22:52   Urinalysis   Squamous Epithelial Cells, UA  0-2    Specific Gravity, UA 1.012  1.020    Ketones, UA Negative  15  mg/dL (1+)    Blood, UA Negative  Negative    Leukocytes, UA Negative  Small (1+)    Nitrite, UA Negative  Negative    RBC, UA  None Seen    WBC, UA  3-5    Bacteria, UA  Trace        Microbiology Results (last 10 days)       Procedure Component Value - Date/Time    Blood Culture - Blood, Hand, Left [[id number]]  (Normal) Collected: 07/22/25 0140    Lab Status: Preliminary result Specimen: Blood from Hand, Left Updated: 07/24/25 0345     Blood Culture No growth at 2 days    Narrative:      Less than seven (7) mL's of blood was collected.  Insufficient quantity may yield false negative results.    Blood Culture - Blood, Arm, Left [[id number]]  (Normal) Collected: 07/22/25 0135    Lab Status: Preliminary result Specimen: Blood from Arm, Left Updated: 07/24/25 0345     Blood Culture No growth at 2 days    Narrative:      Less than seven (7) mL's of blood was collected.  Insufficient quantity may yield false negative results.    Gastrointestinal Panel, PCR - Stool, Per Rectum [[id number]]  (Abnormal) Collected: 07/21/25 0801    Lab Status: Final result Specimen: Stool from Per Rectum Updated: 07/21/25 1026     Campylobacter Not Detected     Plesiomonas shigelloides Not Detected     Salmonella Not Detected     Vibrio Not Detected     Vibrio cholerae Not Detected     Yersinia enterocolitica Not Detected     Enteroaggregative E. coli (EAEC) Detected     Enteropathogenic E. coli (EPEC) Not Detected     Enterotoxigenic E. coli (ETEC) lt/st Not Detected     Shiga-like toxin-producing E. coli (STEC) stx1/stx2 Not Detected     Shigella/Enteroinvasive E. coli (EIEC) Not Detected     Cryptosporidium Not Detected     Cyclospora cayetanensis Not Detected     Entamoeba histolytica Not Detected     Giardia lamblia Not Detected     Adenovirus F40/41 Not Detected     Astrovirus Not Detected     Norovirus GI/GII Not Detected     Rotavirus A Not Detected     Sapovirus (I, II, IV or V) Not Detected            No radiology results from the  last 24 hrs    Results for orders placed during the hospital encounter of 05/09/25    Adult Transthoracic Echo Complete W/ Cont if Necessary Per Protocol 05/09/2025 12:04 PM    Interpretation Summary    Left ventricular systolic function is moderately decreased.Left ventricular ejection fraction appears to be 31 - 35%.    The following left ventricular wall segments are dyskinetic: mid anteroseptal. The following left ventricular wall segments are akinetic: mid anterior, apical anterior, apical septal and apex. The following left ventricular wall segments are aneurysmal: mid anteroseptal.    No hemodynamically significant valvular heart      Assessment & Plan   Assessment & Plan       N&V (nausea and vomiting)    Diarrhea    Enteritis    Coronary artery disease    Hypothyroidism    HFrEF (heart failure with reduced ejection fraction)    Hematochezia      Ongoing Enteritis  N/V   Bloody diarrhea (improving)  Enteroaggregative E.coli (in stool GI pcr panel 7/21/25)  -s/p 3 doses of zithromax 7/21-7/23 prior to discharge home  -ct a/p on 7/21 without acute abnormality  -benign exam currently; labs benign; hgb 15  -suspect combination of enteroaggregative e.coli +/- possible component of ischemic colitis (had played golf outside in hot weather day prior to symptom onset)  -clears, gentle hydration, antiemetics ; trend diarrhea appearance (less blood recently) and abdominal exam. If exam, hematochezia, or labs worsens low threshold for re-CT scan or GI consultation. As long as these are improving continue above supportive approach  -am labs still pending; q8h hgb      CAD  Chronic HFrEF (ef 31-35 last echo)  HL  -continue entresto at reduced dose of 1/2 tab bid (same as recent discharge instructions from observation unit on 7/23)  -continue asa  -hold plavix and monitor blood diarrhea to ensure continues to improve  -statin  -coreg    Hypothyroidism  -continue levothyroxine  -tsh ok in June 2025    CKD 3 (baseline cr  ~1.3-1.5)  -stable, monitor    Q8h hgb  Am labs ordered            VTE Prophylaxis:  No VTE prophylaxis order currently exists.          CODE STATUS:    Code Status and Medical Interventions: CPR (Attempt to Resuscitate); Full Support   Ordered at: 07/24/25 0901     Code Status (Patient has no pulse and is not breathing):    CPR (Attempt to Resuscitate)     Medical Interventions (Patient has pulse or is breathing):    Full Support       Expected Discharge   Expected discharge date/ time has not been documented.     Chetan Ventura MD  07/24/25

## 2025-07-24 NOTE — THERAPY DISCHARGE NOTE
Patient Name: Gee Whitney  : 1950    MRN: [id number]                              Today's Date: 2025       Admit Date: 2025    Visit Dx:     ICD-10-CM ICD-9-CM   1. Intractable nausea and vomiting  R11.2 536.2   2. Dehydration  E86.0 276.51   3. E coli enteritis  A04.4 008.00     Patient Active Problem List   Diagnosis    Coronary artery disease    Essential hypertension    Dyslipidemia    Hypothyroidism    Gastroenteritis    Leukocytosis    Chronic cough    Chronic rhinitis    Ischemic cardiomyopathy    Heart failure    Progressive angina    CAD (coronary artery disease)    HFrEF (heart failure with reduced ejection fraction)    Diarrhea    Chronic HFrEF (heart failure with reduced ejection fraction)    N&V (nausea and vomiting)    Enteritis    Hematochezia     Past Medical History:   Diagnosis Date    Chronic kidney disease     Congenital heart disease     Coronary artery disease     Disease of thyroid gland     GERD (gastroesophageal reflux disease)     Have occually but not in regulat basis    Heart failure 2025    Hyperlipidemia     Hypertension     Myocardial infarction      Past Surgical History:   Procedure Laterality Date    BRONCHOSCOPY      CARDIAC CATHETERIZATION      CARDIAC CATHETERIZATION N/A 6/10/2025    Procedure: Left Heart Cath - Right radial access;  Surgeon: Shaun Joseph MD;  Location:  [facility] CATH INVASIVE LOCATION;  Service: Cardiovascular;  Laterality: N/A;    CARDIAC CATHETERIZATION N/A 6/10/2025    Procedure: Functional Flow [place];  Surgeon: Shaun Joseph MD;  Location:  [facility] CATH INVASIVE LOCATION;  Service: Cardiovascular;  Laterality: N/A;    CARDIAC CATHETERIZATION N/A 6/10/2025    Procedure: Stent KARSTEN coronary;  Surgeon: Shaun Joseph MD;  Location:  [facility] CATH INVASIVE LOCATION;  Service: Cardiovascular;  Laterality: N/A;    HERNIA REPAIR      TENDON RELEASE      Left thumb       General Information       Row Name 25 1080           Physical Therapy Time and Intention    Document Type discharge evaluation/summary  -LM     Mode of Treatment physical therapy  -LM       Row Name 07/24/25 1456          General Information    Patient Profile Reviewed yes  -LM     Prior Level of Function independent:;all household mobility;gait;ADL's;driving  -LM     Existing Precautions/Restrictions no known precautions/restrictions  -LM     Barriers to Rehab none identified  -LM       Row Name 07/24/25 1456          Living Environment    Current Living Arrangements home  -LM     People in Home spouse  -LM       Row Name 07/24/25 1456          Home Main Entrance    Number of Stairs, Main Entrance one  One small threshold step  -LM     Stair Railings, Main Entrance none  -LM       Row Name 07/24/25 1456          Stairs Within Home, Primary    Number of Stairs, Within Home, Primary none  -LM       Row Name 07/24/25 1456          Cognition    Orientation Status (Cognition) oriented x 4  -LM               User Key  (r) = Recorded By, (t) = Taken By, (c) = Cosigned By      Initials Name Provider Type    LM Ling Carter, NARESH Physical Therapist                   Mobility       Row Name 07/24/25 1458          Bed Mobility    Bed Mobility supine-sit;sit-supine  -LM     Supine-Sit [place] (Bed Mobility) modified independence  -LM     Sit-Supine [place] (Bed Mobility) independent  HOB flat; No BR  -LM     Assistive Device (Bed Mobility) head of bed elevated  -LM       Row Name 07/24/25 1458          Sit-Stand Transfer    Sit-Stand [place] (Transfers) independent  -LM     Comment, (Sit-Stand Transfer) Stood x 2 - first from EOB; second from toilet.  Pt able to doff pants/brief and don new brief independently.  -LM       Row Name 07/24/25 1459          Gait/Stairs (Locomotion)    [place] Level (Gait) independent  -LM     Distance in Feet (Gait) 1000  -LM     Comment, (Gait/Stairs) Pt started off very mildly unsteady with the first few steps, but was  steady the remainder of ambulation.  Pt reports feeling much better and feels close to baseline re: mobility.  States he will walk in the halls with his family.  -LM               User Key  (r) = Recorded By, (t) = Taken By, (c) = Cosigned By      Initials Name Provider Type    Ling Sloan PT Physical Therapist                   Obj/Interventions       Row Name 07/24/25 1504          Range of Motion Comprehensive    General Range of Motion bilateral lower extremity ROM WFL  -LM       [facility] Name 07/24/25 1504          Strength Comprehensive (MMT)    General Manual Muscle Testing (MMT) Assessment no strength deficits identified  BLEs  -LM       [facility] Name 07/24/25 1504          Balance    Balance Assessment sitting static balance;standing static balance;standing dynamic balance;sitting dynamic balance  -LM     Static Sitting Balance independent  -LM     Dynamic Sitting Balance independent  -LM     Position, Sitting Balance unsupported  -LM     Static Standing Balance independent  -LM     Dynamic Standing Balance independent  -LM     Position/Device Used, Standing Balance unsupported  -LM       Row Name 07/24/25 1504          Sensory Assessment (Somatosensory)    Sensory Assessment (Somatosensory) LE sensation intact  -LM               User Key  (r) = Recorded By, (t) = Taken By, (c) = Cosigned By      Initials Name Provider Type    Ling Sloan PT Physical Therapist                   Goals/Plan    No documentation.                  Clinical Impression       [facility] Name 07/24/25 1504          Pain    Pretreatment Pain Rating 0/10 - no pain  -LM     Posttreatment Pain Rating 0/10 - no pain  -LM       Row Name 07/24/25 1504          Plan of Care Review    Plan of Care Reviewed With patient;spouse  -     Outcome Evaluation PT evaluation completed.  Pt demonstrated independence with all mobility including ambulating 1000 feet without AD.  Pt reports he feels close to baseline regarding mobility.  No need for skilled  PT services at this time. Recommend home at d/c.  PT signing off.  -LM       Row Name 07/24/25 1504          Therapy Assessment/Plan (PT)    Criteria for Skilled Interventions Met (PT) no;no problems identified which require skilled intervention  -LM     Therapy Frequency (PT) evaluation only  -LM     Predicted Duration of Therapy Intervention (PT) Eval Only  -LM       Row Name 07/24/25 1504          Vital Signs    Pre Systolic BP Rehab 137  -LM     Pre Treatment Diastolic BP 59  -LM     Pretreatment Heart Rate (beats/min) 58  -LM     Posttreatment Heart Rate (beats/min) 72  -LM     Pre SpO2 (%) 95  -LM     Pre Patient Position Supine  -LM     Post Patient Position Supine  -LM       Row Name 07/24/25 1504          Positioning and Restraints    Pre-Treatment Position in bed  -LM     Post Treatment Position bed  -LM     In Bed fowlers;call light within reach;encouraged to call for assist;with family/caregiver;notified nsg  -LM               User Key  (r) = Recorded By, (t) = Taken By, (c) = Cosigned By      Initials Name Provider Type    LM Ling Carter, PT Physical Therapist                   Outcome Measures       Row Name 07/24/25 1507 07/24/25 1210       How much help from another person do you currently need...    Turning from your back to your side while in flat bed without using bedrails? 4  -LM 4  -JH    Moving from lying on back to sitting on the side of a flat bed without bedrails? 4  -LM 4  -JH    Moving to and from a bed to a chair (including a wheelchair)? 4  -LM 4  -JH    Standing up from a chair using your arms (e.g., wheelchair, bedside chair)? 4  -LM 4  -JH    Climbing 3-5 steps with a railing? 4  -LM 4  -JH    To walk in hospital room? 4  -LM 4  -JH    AM-PAC 6 Clicks Score (PT) 24  -LM 24  -JH    Highest Level of Mobility Goal Walk 250 Feet or More - 8  -LM Walk 250 Feet or More - 8  -JH      Row Name 07/24/25 1507          Functional Assessment    Outcome Measure Options AM-PAC 6 Clicks Basic  Mobility (PT)  -LM               User Key  (r) = Recorded By, (t) = Taken By, (c) = Cosigned By      Initials Name Provider Type     Ling Carter, NARESH Physical Therapist    Matilde Chatterjee RN Registered Nurse                  Physical Therapy Education       Title: PT OT SLP Therapies (Done)       Topic: Physical Therapy (Done)       Point: Mobility training (Done)       Learning Progress Summary            Patient Acceptance, E, VU,DU by  at 7/24/2025 1508                      Point: Home exercise program (Deleted)       Learner Progress:  Not documented in this visit.              Point: Precautions (Done)       Learning Progress Summary            Patient Acceptance, E, VU,DU by  at 7/24/2025 1508                                      User Key       Initials Effective Dates Name Provider Type Discipline     07/18/25 -  Ling Carter, NARESH Physical Therapist PT                  PT Recommendation and Plan     Outcome Evaluation: PT evaluation completed.  Pt demonstrated independence with all mobility including ambulating 1000 feet without AD.  Pt reports he feels close to baseline regarding mobility.  No need for skilled PT services at this time. Recommend home at d/c.  PT signing off.     Time Calculation:   PT Evaluation Complexity  History, PT Evaluation Complexity: 3 or more personal factors and/or comorbidities  Examination of Body Systems (PT Eval Complexity): total of 4 or more elements  Clinical Presentation (PT Evaluation Complexity): stable  Clinical Decision Making (PT Evaluation Complexity): low complexity  Overall Complexity (PT Evaluation Complexity): low complexity     PT Charges       Row Name 07/24/25 1508             Time Calculation    Start Time 1418  -LM      PT Received On 07/24/25  -LM         Untimed Charges    PT Eval/Re-eval Minutes 46  -LM         Total Minutes    Untimed Charges Total Minutes 46  -LM       Total Minutes 46  -LM                User Key  (r) = Recorded By, (t) =  Taken By, (c) = Cosigned By      Initials Name Provider Type    LM Ling Carter, PT Physical Therapist                  Therapy Charges for Today       Code Description Service Date Service Provider Modifiers Qty    25205360398 HC PT EVAL LOW COMPLEXITY 4 7/24/2025 Ling Carter, PT GP 1            PT G-Codes  Outcome Measure Options: AM-PAC 6 Clicks Basic Mobility (PT)  AM-PAC 6 Clicks Score (PT): 24    PT Discharge Summary  Anticipated Discharge Disposition (PT): home    Ling Carter PT  7/24/2025

## 2025-07-24 NOTE — PLAN OF CARE
Goal Outcome Evaluation:  Plan of Care Reviewed With: patient, spouse           Outcome Evaluation: PT evaluation completed.  Pt demonstrated independence with all mobility including ambulating 1000 feet without AD.  Pt reports he feels close to baseline regarding mobility.  No need for skilled PT services at this time. Recommend home at d/c.  PT signing off.    Anticipated Discharge Disposition (PT): home

## 2025-07-24 NOTE — ED PROVIDER NOTES
[place]    EMERGENCY DEPARTMENT ENCOUNTER      Pt Name: Gee Whitney  MRN: [id number]  YOB: 1950  Date of evaluation: 7/23/2025  Provider: Fernandez Kumar MD    CHIEF COMPLAINT       Chief Complaint   Patient presents with    Abdominal Pain         HISTORY OF PRESENT ILLNESS   Gee Whitney is a 74 y.o. male who presents to the emergency department for evaluation of ongoing nausea vomiting abdominal cramping and diarrhea in the setting of an acute diarrheal illness that started several days ago.  He was treated in our hospital and released earlier today.  He returns due to ongoing symptoms.  No fevers.  No blood in his stool.    REVIEW OF SYSTEMS     ROS:  A chief complaint appropriate review of systems was completed and is negative except as noted in the HPI.      PAST MEDICAL HISTORY     Past Medical History:   Diagnosis Date    Chronic kidney disease     Congenital heart disease 1977    Coronary artery disease 1977    Disease of thyroid gland     GERD (gastroesophageal reflux disease)     Have occually but not in regulat basis    Heart failure 05/09/2025    Hyperlipidemia     Hypertension     Myocardial infarction          SURGICAL HISTORY       Past Surgical History:   Procedure Laterality Date    BRONCHOSCOPY      CARDIAC CATHETERIZATION  1977    CARDIAC CATHETERIZATION N/A 6/10/2025    Procedure: Left Heart Cath - Right radial access;  Surgeon: Shaun Joseph MD;  Location: [facility] CATH INVASIVE LOCATION;  Service: Cardiovascular;  Laterality: N/A;    CARDIAC CATHETERIZATION N/A 6/10/2025    Procedure: Functional Flow [place];  Surgeon: Shaun Joseph MD;  Location:  [facility] CATH INVASIVE LOCATION;  Service: Cardiovascular;  Laterality: N/A;    CARDIAC CATHETERIZATION N/A 6/10/2025    Procedure: Stent KARSTEN coronary;  Surgeon: Shaun Joseph MD;  Location:  [facility] CATH INVASIVE LOCATION;  Service: Cardiovascular;  Laterality: N/A;    HERNIA REPAIR      TENDON RELEASE      Left thumb           CURRENT MEDICATIONS       Current Facility-Administered Medications:     Sodium Chloride (PF) 0.9 % 10 mL, 10 mL, Intravenous, PRN, Fernandez Kumar MD    Current Outpatient Medications:     albuterol sulfate  (90 Base) MCG/ACT inhaler, Inhale 2 puffs Every 4 (Four) Hours As Needed for Wheezing., Disp: 18 g, Rfl: 3    aspirin 81 MG EC tablet, Take 1 tablet by mouth Daily., Disp: 90 tablet, Rfl: 3    atorvastatin (LIPITOR) 40 MG tablet, Take 1 tablet by mouth Every Night. (Patient taking differently: Take 2 tablets by mouth Every Night.), Disp: , Rfl:     carvedilol (COREG) 6.25 MG tablet, Take 1 tablet by mouth 2 (Two) Times a Day., Disp: 60 tablet, Rfl: 11    clopidogrel (PLAVIX) 75 MG tablet, Take 1 tablet by mouth Daily., Disp: 90 tablet, Rfl: 1    furosemide (LASIX) 20 MG tablet, Take 1 tablet by mouth As Needed., Disp: , Rfl:     ipratropium (ATROVENT) 0.06 % nasal spray, Administer 2 sprays into the nostril(s) as directed by provider 3 (Three) Times a Day., Disp: , Rfl:     levothyroxine (SYNTHROID, LEVOTHROID) 75 MCG tablet, Take 1 tablet by mouth Daily., Disp: , Rfl:     Multiple Vitamins-Minerals (ICAPS AREDS 2 PO), Take 2 tablets by mouth Daily., Disp: , Rfl:     nitroglycerin (NITROSTAT) 0.4 MG SL tablet, Place 1 tablet under the tongue Every 5 (Five) Minutes As Needed., Disp: , Rfl:     Omega-3 Fatty Acids (FISH OIL) 1200 MG capsule delayed-release, Take 1 capsule by mouth Daily., Disp: , Rfl:     Polyethylene Glycol 3350 (MIRALAX PO), Take  by mouth Every Other Day. Mixes with orange juice, Disp: , Rfl:     sacubitril-valsartan (ENTRESTO) 24-26 MG tablet, Take 0.5 tablets by mouth Every 12 (Twelve) Hours., Disp: 90 tablet, Rfl: 3    spironolactone (ALDACTONE) 25 MG tablet, Take 1 tablet by mouth Daily. (Patient taking differently: Take 1 tablet by mouth As Needed.), Disp: 90 tablet, Rfl: 3    terazosin (HYTRIN) 1 MG capsule, Take 1 capsule by mouth Every Night., Disp: 90 capsule, Rfl: 3     tiotropium bromide-olodaterol (Stiolto Respimat) 2.5-2.5 MCG/ACT aerosol solution inhaler, Inhale 2 puffs Daily As Needed (cough)., Disp: 1 each, Rfl: 3    traZODone (DESYREL) 50 MG tablet, Take 1 tablet by mouth Every Night., Disp: , Rfl:     vitamin D (ERGOCALCIFEROL) 1.25 MG (42921 UT) capsule capsule, Take 1 capsule by mouth 1 (One) Time Per Week., Disp: , Rfl:     ALLERGIES     Patient has no known allergies.    FAMILY HISTORY       Family History   Problem Relation Age of Onset    Heart failure Mother     Heart attack Mother         Had triple bypass     Heart disease Mother         Had triple bypass     Other Father 28         in car accident    Heart attack Sister     Mitral valve prolapse Sister     Asthma Brother     Stroke Brother     Hypertension Child           SOCIAL HISTORY       Social History     Socioeconomic History    Marital status:     Number of children: 2   Tobacco Use    Smoking status: Former     Current packs/day: 0.00     Average packs/day: 1 pack/day for 10.0 years (10.0 ttl pk-yrs)     Types: Cigarettes     Start date: 1967     Quit date: 1977     Years since quittin.5     Passive exposure: Past    Smokeless tobacco: Never    Tobacco comments:     Stopped the day i had heart attack   Vaping Use    Vaping status: Never Used   Substance and Sexual Activity    Alcohol use: Yes     Comment: Maybe couple drinks a month    Drug use: No    Sexual activity: Not Currently     Partners: Female     Birth control/protection: None         PHYSICAL EXAM    (up to 7 for level 4, 8 or more for level 5)     Vitals:    25 0400 25 0430 25 0500 25 0530   BP: 126/74 113/66 113/65 104/54   Pulse: 87 95 90 89   Resp:       Temp:       TempSrc:       SpO2: 97% 95% 95% 93%   Weight:       Height:           Physical Exam  Constitutional:       General: He is not in acute distress.  HENT:      Head: Normocephalic and atraumatic.      Mouth/Throat:       Mouth: Mucous membranes are dry.   Eyes:      Conjunctiva/sclera: Conjunctivae normal.      Pupils: Pupils are equal, round, and reactive to light.   Cardiovascular:      Rate and Rhythm: Regular rhythm. Tachycardia present.      Pulses: Normal pulses.   Pulmonary:      Effort: Pulmonary effort is normal. No respiratory distress.   Abdominal:      General: Abdomen is flat. There is no distension.      Tenderness: There is no abdominal tenderness. There is no guarding.   Musculoskeletal:         General: No swelling or deformity. Normal range of motion.   Skin:     General: Skin is warm and dry.      Capillary Refill: Capillary refill takes 2 to 3 seconds.   Neurological:      General: No focal deficit present.      Mental Status: He is alert and oriented to person, place, and time.   Psychiatric:         Mood and Affect: Mood normal.         Behavior: Behavior normal.            DIAGNOSTIC RESULTS     EKG: All EKGs are interpreted by the Emergency Department Physician who either signs or Co-signs this chart in the absence of a cardiologist.    Telemetry Scan   Final Result            RADIOLOGY:   [x] Radiologist's Report Reviewed:  No orders to display       I ordered and independently reviewed the above noted radiographic studies.        LABS:  I independently interpreted all laboratory studies conducted during this ED visit.  The results of these studies can be seen below and my independent interpretation in the ED course      EMERGENCY DEPARTMENT COURSE and DIFFERENTIAL DIAGNOSIS/MDM:   Vitals:  AS OF 05:51 EDT    BP - 104/54  HR - 89  TEMP - 97.3 °F (36.3 °C) (Oral)  O2 SATS - 93%        Discussion below represents my analysis of pertinent findings related to patient's condition, differential diagnosis, treatment plan and final disposition.      Differential diagnosis:  The differential diagnosis associated with the patient's presentation includes: Ongoing acute diarrheal illness, superimposed C. difficile  infection is considered though felt to be unlikely.  Considered dehydration, electrolyte derangement      Independent interpretations (ECG/rhythm strip/X-ray/US/CT scan): See ED course      Additional sources:  Discussed/obtained information from independent historians:   [] Spouse:   [] Parent:   [] Friend:   [] EMS:   [] Other:    External record review:  7/23/2025 reviewed most recent discharge summary, patient hospitalized for acute diarrhea, nonbloody, was diagnosed with enteroaggregate of E. coli.  He was given IV fluids and admitted.  He was treated with azithromycin.      Patient's care impacted by:   [] Diabetes   [] Hypertension   [] Coronary Artery Disease   [] Cancer   [x] Other: Chronic kidney disease    Care significantly affected by Social Determinants of Health (housing and economic circumstances, unemployment)    [] Yes     [x] No   If yes, Patient's care significantly limited by  Social Determinants of Health including:    [] Inadequate housing    [] Low income    [] Alcoholism and drug addiction in family    [] Problems related to primary support group    [] Unemployment    [] Problems related to employment    [] Other Social Determinants of Health:     I considered prescription management with:    [] Pain medication:   [] Antiviral:   [] Antibiotic:   [] Other:    Additional orders considered but not ordered:  The following testing was considered but ultimately not selected:     ED Course:    ED Course as of 07/24/25 0551   Thu Jul 24, 2025   0009 Laboratory workup independently interpreted by myself demonstrates elevated creatinine, mildly worse compared with 18 hours ago.  Otherwise labs unremarkable. [KB]      ED Course User Index  [KB] Fernandez Kumar MD         Diagnostic labs were obtained.  IV access, IV fluids, antiemetics and antispasmodics were administered.    Despite IV fluids and Zofran administration patient continues to have ongoing nausea and vomiting in the ER.  Additional  antiemetics were ordered and patient will be admitted for further evaluation and treatment      PROCEDURES:  Procedures    CRITICAL CARE TIME        CONSULTS   Hospital medicine    FINAL IMPRESSION      1. Intractable nausea and vomiting    2. Dehydration    3. E coli enteritis          DISPOSITION/PLAN     ED Disposition       ED Disposition   Decision to Admit    Condition   --    Comment   --                 Comment: Please note this report has been produced using speech recognition software.      Fernandez Kumar MD  Attending Emergency Physician    Recent Results (from the past 24 hours)   Comprehensive Metabolic Panel    Collection Time: 07/23/25  9:33 PM    Specimen: Blood   Result Value Ref Range    Glucose 130 (H) 65 - 99 mg/dL    BUN 23.5 (H) 8.0 - 23.0 mg/dL    Creatinine 1.51 (H) 0.76 - 1.27 mg/dL    Sodium 139 136 - 145 mmol/L    Potassium 4.7 3.5 - 5.2 mmol/L    Chloride 110 (H) 98 - 107 mmol/L    CO2 16.8 (L) 22.0 - 29.0 mmol/L    Calcium 8.8 8.6 - 10.5 mg/dL    Total Protein 5.9 (L) 6.0 - 8.5 g/dL    Albumin 3.6 3.5 - 5.2 g/dL    ALT (SGPT) 8 1 - 41 U/L    AST (SGOT) 22 1 - 40 U/L    Alkaline Phosphatase 65 39 - 117 U/L    Total Bilirubin 0.3 0.0 - 1.2 mg/dL    Globulin 2.3 gm/dL    A/G Ratio 1.6 g/dL    BUN/Creatinine Ratio 15.6 7.0 - 25.0    Anion Gap 12.2 5.0 - 15.0 mmol/L    eGFR 47.6 (L) >60.0 mL/min/1.73   Lipase    Collection Time: 07/23/25  9:33 PM    Specimen: Blood   Result Value Ref Range    Lipase 27 13 - 60 U/L   Lactic Acid, Plasma    Collection Time: 07/23/25  9:33 PM    Specimen: Blood   Result Value Ref Range    Lactate 1.9 0.5 - 2.0 mmol/L   Green Top (Gel)    Collection Time: 07/23/25  9:33 PM   Result Value Ref Range    Extra Tube Hold for add-ons.    Lavender Top    Collection Time: 07/23/25  9:33 PM   Result Value Ref Range    Extra Tube hold for add-on    Gold Top - SST    Collection Time: 07/23/25  9:33 PM   Result Value Ref Range    Extra Tube Hold for add-ons.    Gray Top     Collection Time: 07/23/25  9:33 PM   Result Value Ref Range    Extra Tube Hold for add-ons.    Light Blue Top    Collection Time: 07/23/25  9:33 PM   Result Value Ref Range    Extra Tube Hold for add-ons.    CBC Auto Differential    Collection Time: 07/23/25  9:33 PM    Specimen: Blood   Result Value Ref Range    WBC 10.59 3.40 - 10.80 10*3/mm3    RBC 5.16 4.14 - 5.80 10*6/mm3    Hemoglobin 15.6 13.0 - 17.7 g/dL    Hematocrit 47.9 37.5 - 51.0 %    MCV 92.8 79.0 - 97.0 fL    MCH 30.2 26.6 - 33.0 pg    MCHC 32.6 31.5 - 35.7 g/dL    RDW 13.2 12.3 - 15.4 %    RDW-SD 45.1 37.0 - 54.0 fl    MPV 9.1 6.0 - 12.0 fL    Platelets 179 140 - 450 10*3/mm3    Neutrophil % 77.5 (H) 42.7 - 76.0 %    Lymphocyte % 9.7 (L) 19.6 - 45.3 %    Monocyte % 9.1 5.0 - 12.0 %    Eosinophil % 3.0 0.3 - 6.2 %    Basophil % 0.3 0.0 - 1.5 %    Immature Grans % 0.4 0.0 - 0.5 %    Neutrophils, Absolute 8.21 (H) 1.70 - 7.00 10*3/mm3    Lymphocytes, Absolute 1.03 0.70 - 3.10 10*3/mm3    Monocytes, Absolute 0.96 (H) 0.10 - 0.90 10*3/mm3    Eosinophils, Absolute 0.32 0.00 - 0.40 10*3/mm3    Basophils, Absolute 0.03 0.00 - 0.20 10*3/mm3    Immature Grans, Absolute 0.04 0.00 - 0.05 10*3/mm3    nRBC 0.0 0.0 - 0.2 /100 WBC   Urinalysis With Microscopic If Indicated (No Culture) - Urine, Clean Catch    Collection Time: 07/23/25 10:52 PM    Specimen: Urine, Clean Catch   Result Value Ref Range    Color, UA Yellow Yellow, Straw    Appearance, UA Clear Clear    pH, UA 5.5 5.0 - 8.0    Specific Gravity, UA 1.020 1.005 - 1.030    Glucose, UA Negative Negative    Ketones, UA 15 mg/dL (1+) (A) Negative    Bilirubin, UA Negative Negative    Blood, UA Negative Negative    Protein,  mg/dL (2+) (A) Negative    Leuk Esterase, UA Small (1+) (A) Negative    Nitrite, UA Negative Negative    Urobilinogen, UA 0.2 E.U./dL 0.2 - 1.0 E.U./dL   Urinalysis, Microscopic Only - Urine, Clean Catch    Collection Time: 07/23/25 10:52 PM    Specimen: Urine, Clean Catch   Result  Value Ref Range    RBC, UA None Seen None Seen, 0-2 /HPF    WBC, UA 3-5 (A) None Seen, 0-2 /HPF    Bacteria, UA Trace (A) None Seen /HPF    Squamous Epithelial Cells, UA 0-2 None Seen, 0-2 /HPF    Hyaline Casts, UA 21-30 None Seen /LPF    Methodology Manual Light Microscopy      Note: In addition to lab results from this visit, the labs listed above may include labs taken at another facility or during a different encounter within the last 24 hours. Please correlate lab times with ED admission and discharge times for further clarification of the services performed during this visit.                 Fernandez Kumar MD  07/24/25 [id number]

## 2025-07-24 NOTE — PLAN OF CARE
Goal Outcome Evaluation:  Plan of Care Reviewed With: patient           Outcome Evaluation: OT eval complete.  Pt ind LBD,  independent to ambulate in hallway without AD.  Pt is very close to baseline with ADLs and does not demo any deficits which would require OT intervention at this time. Recommend home upon d/c.    Anticipated Discharge Disposition (OT): home

## 2025-07-24 NOTE — OUTREACH NOTE
Prep Survey      Flowsheet Row Responses   [facility] facility patient discharged from? [facility]   Is LACE score < 7 ? Yes   Eligibility Readm Mgmt   Discharge diagnosis Diarrhea   Does the patient have one of the following disease processes/diagnoses(primary or secondary)? Other   Prep survey completed? Yes            Zoey WILLIAMSON - Registered Nurse

## 2025-07-24 NOTE — THERAPY DISCHARGE NOTE
Acute Care - Occupational Therapy Discharge  [facility]    Patient Name: Gee Whitney  : 1950    MRN: [id number]                              Today's Date: 2025       Admit Date: 2025    Visit Dx:     ICD-10-CM ICD-9-CM   1. Intractable nausea and vomiting  R11.2 536.2   2. Dehydration  E86.0 276.51   3. E coli enteritis  A04.4 008.00     Patient Active Problem List   Diagnosis    Coronary artery disease    Essential hypertension    Dyslipidemia    Hypothyroidism    Gastroenteritis    Leukocytosis    Chronic cough    Chronic rhinitis    Ischemic cardiomyopathy    Heart failure    Progressive angina    CAD (coronary artery disease)    HFrEF (heart failure with reduced ejection fraction)    Diarrhea    Chronic HFrEF (heart failure with reduced ejection fraction)    N&V (nausea and vomiting)    Enteritis    Hematochezia     Past Medical History:   Diagnosis Date    Chronic kidney disease     Congenital heart disease     Coronary artery disease     Disease of thyroid gland     GERD (gastroesophageal reflux disease)     Have occually but not in regulat basis    Heart failure 2025    Hyperlipidemia     Hypertension     Myocardial infarction      Past Surgical History:   Procedure Laterality Date    BRONCHOSCOPY      CARDIAC CATHETERIZATION      CARDIAC CATHETERIZATION N/A 6/10/2025    Procedure: Left Heart Cath - Right radial access;  Surgeon: Shaun Joseph MD;  Location: [facility] CATH INVASIVE LOCATION;  Service: Cardiovascular;  Laterality: N/A;    CARDIAC CATHETERIZATION N/A 6/10/2025    Procedure: Functional Flow [place];  Surgeon: Shaun Joseph MD;  Location: [facility] CATH INVASIVE LOCATION;  Service: Cardiovascular;  Laterality: N/A;    CARDIAC CATHETERIZATION N/A 6/10/2025    Procedure: Stent KARSTEN coronary;  Surgeon: Shaun Joseph MD;  Location: [facility] CATH INVASIVE LOCATION;  Service: Cardiovascular;  Laterality: N/A;    HERNIA REPAIR      TENDON RELEASE      Left  thumb       General Information       Row Name 07/24/25 1417          OT Time and Intention    Document Type discharge evaluation/summary  -     Mode of Treatment occupational therapy  -       Row Name 07/24/25 1417          General Information    Patient Profile Reviewed yes  -AC     Prior Level of Function independent:;all household mobility;ADL's;driving  -     Existing Precautions/Restrictions no known precautions/restrictions  -     Barriers to Rehab none identified  -       Row Name 07/24/25 1417          Occupational Profile    Environmental Supports and Barriers (Occupational Profile) walk in shower with shower seat  -       Row Name 07/24/25 1417          Living Environment    Current Living Arrangements home  -     People in Home spouse  -       Row Name 07/24/25 1417          Home Main Entrance    Number of Stairs, Main Entrance one  -AC     Stair Railings, Main Entrance none  -       Row Name 07/24/25 1417          Stairs Within Home, Primary    Number of Stairs, Within Home, Primary none  -       Row Name 07/24/25 1417          Cognition    Orientation Status (Cognition) oriented x 4  -               User Key  (r) = Recorded By, (t) = Taken By, (c) = Cosigned By      Initials Name Provider Type    AC Padmini Gabriel OT Occupational Therapist                   Mobility/ADL's       Row Name 07/24/25 1417          Bed Mobility    Bed Mobility supine-sit;sit-supine  -     Supine-Sit [place] (Bed Mobility) modified independence  -     Sit-Supine [place] (Bed Mobility) independent  bed flat  -     Assistive Device (Bed Mobility) head of bed elevated  -       Row Name 07/24/25 1417          Sit-Stand Transfer    Sit-Stand [place] (Transfers) independent  -       Row Name 07/24/25 1417          Functional Mobility    Functional Mobility- Ind. Level independent  -     Functional Mobility-Distance (Feet) --  in hallway  -       Row Name 07/24/25 1417           Activities of Daily Living    BADL Assessment/Intervention lower body dressing;toileting  -[facility] Name 07/24/25 1417          Lower Body Dressing Assessment/Training    [place] Level (Lower Body Dressing) doff;don;socks;pants/bottoms;independent  -AC     Position (Lower Body Dressing) edge of bed sitting;unsupported sitting;unsupported standing  -               User Key  (r) = Recorded By, (t) = Taken By, (c) = Cosigned By      Initials Name Provider Type    Padmini Warner, OT Occupational Therapist                   Obj/Interventions       [facility] Name 07/24/25 1513          Sensory Assessment (Somatosensory)    Sensory Assessment (Somatosensory) UE sensation intact  -AC       Row Name 07/24/25 1513          Vision Assessment/Intervention    Visual Impairment/Limitations corrective lenses full-time  -AC       Row Name 07/24/25 1513          Range of Motion Comprehensive    General Range of Motion bilateral upper extremity ROM WNL  -AC       Row Name 07/24/25 1513          Strength Comprehensive (MMT)    General Manual Muscle Testing (MMT) Assessment no strength deficits identified  -AC       Row Name 07/24/25 1513          Balance    Balance Assessment sitting static balance;sitting dynamic balance;standing static balance;standing dynamic balance  -AC     Static Sitting Balance independent  -AC     Dynamic Sitting Balance independent  -AC     Position, Sitting Balance unsupported  -AC     Static Standing Balance independent  -AC     Dynamic Standing Balance independent  -AC     Position/Device Used, Standing Balance unsupported  -AC               User Key  (r) = Recorded By, (t) = Taken By, (c) = Cosigned By      Initials Name Provider Type    Padmini Warner, OT Occupational Therapist                   Goals/Plan    No documentation.                  Clinical Impression       Row Name 07/24/25 1514          Pain Assessment    Pretreatment Pain Rating 0/10 - no pain  -AC     Posttreatment Pain Rating  0/10 - no pain  -AC       Row Name 07/24/25 1514          Plan of Care Review    Plan of Care Reviewed With patient  -AC     Outcome Evaluation OT eval complete.  Pt ind LBD,  independent to ambulate in hallway without AD.  Pt is very close to baseline with ADLs and does not demo any deficits which would require OT intervention at this time. Recommend home upon d/c.  -AC       Row Name 07/24/25 1514          Therapy Assessment/Plan (OT)    Criteria for Skilled Therapeutic Interventions Met (OT) no;no problems identified which require skilled intervention  -     Therapy Frequency (OT) evaluation only  -AC       Row Name 07/24/25 1514          Therapy Plan Review/Discharge Plan (OT)    Anticipated Discharge Disposition (OT) home  -       Row Name 07/24/25 1514          Vital Signs    Pre Systolic BP Rehab 137  -AC     Pre Treatment Diastolic BP 59  -AC     Pretreatment Heart Rate (beats/min) 58  -AC     Posttreatment Heart Rate (beats/min) 72  -AC     Pre SpO2 (%) 95  -AC     O2 Delivery Pre Treatment room air  -AC     O2 Delivery Post Treatment room air  -AC     Pre Patient Position Supine  -AC     Post Patient Position Supine  -AC       Row Name 07/24/25 1514          Positioning and Restraints    Pre-Treatment Position in bed  -AC     Post Treatment Position bed  -AC     In Bed notified nsg;fowlers;call light within reach;with family/caregiver  -               User Key  (r) = Recorded By, (t) = Taken By, (c) = Cosigned By      Initials Name Provider Type    Padmini Warner, OT Occupational Therapist                   Outcome Measures       Row Name 07/24/25 1516          How much help from another is currently needed...    Putting on and taking off regular lower body clothing? 4  -AC     Bathing (including washing, rinsing, and drying) 4  -AC     Toileting (which includes using toilet bed pan or urinal) 4  -AC     Putting on and taking off regular upper body clothing 4  -AC     Taking care of personal  grooming (such as brushing teeth) 4  -AC     Eating meals 4  -AC     AM-PAC 6 Clicks Score (OT) 24  -       Row Name 07/24/25 1507 07/24/25 1210       How much help from another person do you currently need...    Turning from your back to your side while in flat bed without using bedrails? 4  -LM 4  -JH    Moving from lying on back to sitting on the side of a flat bed without bedrails? 4  -LM 4  -JH    Moving to and from a bed to a chair (including a wheelchair)? 4  -LM 4  -JH    Standing up from a chair using your arms (e.g., wheelchair, bedside chair)? 4  -LM 4  -JH    Climbing 3-5 steps with a railing? 4  -LM 4  -JH    To walk in hospital room? 4  -LM 4  -JH    AM-PAC 6 Clicks Score (PT) 24  -LM 24  -JH    Highest Level of Mobility Goal Walk 250 Feet or More - 8  -LM Walk 250 Feet or More - 8  -JH      Row Name 07/24/25 1516 07/24/25 1507       Functional Assessment    Outcome Measure Options AM-PAC 6 Clicks Daily Activity (OT)  - AM-PAC 6 Clicks Basic Mobility (PT)  -              User Key  (r) = Recorded By, (t) = Taken By, (c) = Cosigned By      Initials Name Provider Type     Padmini Gabriel, OT Occupational Therapist    LM Ling Carter, PT Physical Therapist    Matilde Chatterjee RN Registered Nurse                  Occupational Therapy Education       Title: PT OT SLP Therapies (In Progress)       Topic: Occupational Therapy (In Progress)       Point: ADL training (Done)       Learning Progress Summary            Patient Acceptance, E, VU by  at 7/24/2025 1517                                      User Key       Initials Effective Dates Name Provider Type Discipline     02/03/23 -  Padmini Gabriel, OT Occupational Therapist OT                  OT Recommendation and Plan  Therapy Frequency (OT): evaluation only  Plan of Care Review  Plan of Care Reviewed With: patient  Outcome Evaluation: OT eval complete.  Pt ind LBD,  independent to ambulate in hallway without AD.  Pt is very close to baseline  with ADLs and does not demo any deficits which would require OT intervention at this time. Recommend home upon d/c.  Plan of Care Reviewed With: patient  Outcome Evaluation: OT eval complete.  Pt ind LBD,  independent to ambulate in hallway without AD.  Pt is very close to baseline with ADLs and does not demo any deficits which would require OT intervention at this time. Recommend home upon d/c.     Time Calculation:   Evaluation Complexity (OT)  Review Occupational Profile/Medical/Therapy History Complexity: brief/low complexity  Assessment, Occupational Performance/Identification of Deficit Complexity: 1-3 performance deficits  Clinical Decision Making Complexity (OT): problem focused assessment/low complexity  Overall Complexity of Evaluation (OT): low complexity     Time Calculation- OT       Row Name 07/24/25 1417             Time Calculation- OT    OT Start Time 1417  -AC      OT Received On 07/24/25  -AC         Untimed Charges    OT Eval/Re-eval Minutes 50  -AC         Total Minutes    Untimed Charges Total Minutes 50  -AC       Total Minutes 50  -AC                User Key  (r) = Recorded By, (t) = Taken By, (c) = Cosigned By      Initials Name Provider Type    AC Padmini Gabriel OT Occupational Therapist                  Therapy Charges for Today       Code Description Service Date Service Provider Modifiers Qty    [id number]  OT EVAL LOW COMPLEXITY 4 7/24/2025 Padmini Gabriel OT GO 1               OT Discharge Summary  Anticipated Discharge Disposition (OT): home    Padmini Gabriel OT  7/24/2025

## 2025-07-24 NOTE — CASE MANAGEMENT/SOCIAL WORK
Discharge Planning Assessment  [facility]     Patient Name: Gee Whitney  MRN: [id number]  Today's Date: 7/24/2025    Admit Date: 7/24/2025    Plan: IDP   Discharge Needs Assessment       Row Name 07/24/25 1143       Living Environment    People in Home spouse    Current Living Arrangements home    Potentially Unsafe Housing Conditions unable to assess    Primary Care Provided by self    Provides Primary Care For no one    Family Caregiver if Needed spouse    Quality of Family Relationships unable to assess       Resource/Environmental Concerns    Resource/Environmental Concerns none    Transportation Concerns none       Transition Planning    Patient/Family Anticipates Transition to home with family    Transportation Anticipated other (see comments)  may need assistance       Discharge Needs Assessment    Readmission Within the Last 30 Days unable to assess    Equipment Currently Used at Home none                   Discharge Plan       Row Name 07/24/25 1145       Plan    Plan IDP    Plan Comments Pt. was recently discharged from [facility] on 7/23/25. Pt. lives with his spouse Kiersten Whitney in [facility]. Pt.'s PCP is Pramod Blevins. Pt.'s insurance is [employer] Medicare Replacement. Pt. is independent at baseline. No DME, O2, or HH currently. Pt. may need assistance with transportation when medically ready to d/c. Pt. doesn't have an advanced directive or ACP documentation on file. CM will continue to follow pt. throughout his stay.                  Continued Care and Services - Admitted Since 7/24/2025    No active coordination exists.          Demographic Summary       Row Name 07/24/25 1142       General Information    Admission Type observation    Arrived From home    Referral Source admission list;emergency department    Reason for Consult discharge planning    Preferred Language English                   Functional Status       Row Name 07/24/25 1142       Functional Status, IADL    Medications  independent    Meal Preparation independent    Housekeeping independent    Laundry independent    Shopping independent       Mental Status Summary    Recent Changes in Mental Status/Cognitive Functioning unable to assess       Employment/    Employment Status retired                   Psychosocial    No documentation.                  Abuse/Neglect    No documentation.                  Legal    No documentation.                  Substance Abuse    No documentation.                  Patient Forms    No documentation.                     RON Bauer

## 2025-07-24 NOTE — ED NOTES
Gee Whitney    Nursing Report ED to Floor:  Mental status: AOx4  Ambulatory status: Self  Oxygen Therapy:  Ra  Cardiac Rhythm: NSR  Admitted from: Home  Safety Concerns:  None  Precautions: Contact  Social Issues: None  ED Room #:  08    ED Nurse Phone Extension - [phone] or may call [phone].      HPI:   Chief Complaint   Patient presents with    Abdominal Pain       Past Medical History:  Past Medical History:   Diagnosis Date    Chronic kidney disease     Congenital heart disease 1977    Coronary artery disease 1977    Disease of thyroid gland     GERD (gastroesophageal reflux disease)     Have occually but not in regulat basis    Heart failure 05/09/2025    Hyperlipidemia     Hypertension     Myocardial infarction         Past Surgical History:  Past Surgical History:   Procedure Laterality Date    BRONCHOSCOPY      CARDIAC CATHETERIZATION  1977    CARDIAC CATHETERIZATION N/A 6/10/2025    Procedure: Left Heart Cath - Right radial access;  Surgeon: Shaun Joseph MD;  Location:  [facility] CATH INVASIVE LOCATION;  Service: Cardiovascular;  Laterality: N/A;    CARDIAC CATHETERIZATION N/A 6/10/2025    Procedure: Functional Flow [place];  Surgeon: Shaun Joseph MD;  Location:  [facility] CATH INVASIVE LOCATION;  Service: Cardiovascular;  Laterality: N/A;    CARDIAC CATHETERIZATION N/A 6/10/2025    Procedure: Stent KARSTEN coronary;  Surgeon: Shaun Joseph MD;  Location:  [facility] CATH INVASIVE LOCATION;  Service: Cardiovascular;  Laterality: N/A;    HERNIA REPAIR      TENDON RELEASE      Left thumb         Admitting Doctor:   No admitting provider for patient encounter.    Consulting Provider(s):  Consults       No orders found from 6/25/2025 to 7/25/2025.             Admitting Diagnosis:   The primary encounter diagnosis was Intractable nausea and vomiting. Diagnoses of Dehydration and E coli enteritis were also pertinent to this visit.    Most Recent Vitals:   Vitals:    07/24/25 0930 07/24/25 0940 07/24/25 1000 07/24/25  1030   BP: 122/63  115/59 111/58   Pulse: 83 80 73 68   Resp:       Temp:       TempSrc:       SpO2: 95%  92% 94%   Weight:       Height:           Active LDAs/IV Access:   Lines, Drains & Airways       Active LDAs       Name Placement date Placement time Site Days    Peripheral IV 07/23/25 2134 20 G Left Antecubital 07/23/25 2134  Antecubital  less than 1                    Labs (abnormal labs have a star):   Labs Reviewed   COMPREHENSIVE METABOLIC PANEL - Abnormal; Notable for the following components:       Result Value    Glucose 130 (*)     BUN 23.5 (*)     Creatinine 1.51 (*)     Chloride 110 (*)     CO2 16.8 (*)     Total Protein 5.9 (*)     eGFR 47.6 (*)     All other components within normal limits    Narrative:     GFR Categories in Chronic Kidney Disease (CKD)              GFR Category          GFR (mL/min/1.73)    Interpretation  G1                    90 or greater        Normal or high (1)  G2                    60-89                Mild decrease (1)  G3a                   45-59                Mild to moderate decrease  G3b                   30-44                Moderate to severe decrease  G4                    15-29                Severe decrease  G5                    14 or less           Kidney failure    (1)In the absence of evidence of kidney disease, neither GFR category G1 or G2 fulfill the criteria for CKD.    eGFR calculation 2021 CKD-EPI creatinine equation, which does not include race as a factor   URINALYSIS W/ MICROSCOPIC IF INDICATED (NO CULTURE) - Abnormal; Notable for the following components:    Ketones, UA 15 mg/dL (1+) (*)     Protein,  mg/dL (2+) (*)     Leuk Esterase, UA Small (1+) (*)     All other components within normal limits   CBC WITH AUTO DIFFERENTIAL - Abnormal; Notable for the following components:    Neutrophil % 77.5 (*)     Lymphocyte % 9.7 (*)     Neutrophils, Absolute 8.21 (*)     Monocytes, Absolute 0.96 (*)     All other components within normal limits    URINALYSIS, MICROSCOPIC ONLY - Abnormal; Notable for the following components:    WBC, UA 3-5 (*)     Bacteria, UA Trace (*)     All other components within normal limits   COMPREHENSIVE METABOLIC PANEL - Abnormal; Notable for the following components:    Glucose 100 (*)     BUN 30.5 (*)     Creatinine 1.83 (*)     Chloride 112 (*)     CO2 16.5 (*)     Calcium 8.4 (*)     Total Protein 5.0 (*)     Albumin 3.2 (*)     eGFR 37.8 (*)     All other components within normal limits    Narrative:     GFR Categories in Chronic Kidney Disease (CKD)              GFR Category          GFR (mL/min/1.73)    Interpretation  G1                    90 or greater        Normal or high (1)  G2                    60-89                Mild decrease (1)  G3a                   45-59                Mild to moderate decrease  G3b                   30-44                Moderate to severe decrease  G4                    15-29                Severe decrease  G5                    14 or less           Kidney failure    (1)In the absence of evidence of kidney disease, neither GFR category G1 or G2 fulfill the criteria for CKD.    eGFR calculation 2021 CKD-EPI creatinine equation, which does not include race as a factor   MAGNESIUM - Abnormal; Notable for the following components:    Magnesium 1.5 (*)     All other components within normal limits   CBC WITH AUTO DIFFERENTIAL - Abnormal; Notable for the following components:    Lymphocyte % 11.6 (*)     Monocyte % 15.4 (*)     Neutrophils, Absolute 7.09 (*)     Monocytes, Absolute 1.61 (*)     Eosinophils, Absolute 0.47 (*)     All other components within normal limits   LIPASE - Normal   LACTIC ACID, PLASMA - Normal   LIPASE - Normal   RAINBOW DRAW    Narrative:     The following orders were created for panel order Ronald Draw.  Procedure                               Abnormality         Status                     ---------                               -----------         ------                      Green Top (Gel)[[id number]]                                  Final result               Lavender Top[[id number]]                                     Final result               Gold Top - SST[[id number]]                                   Final result               Santos Top[[id number]]                                         Final result               Light Blue Top[[id number]]                                   Final result                 Please view results for these tests on the individual orders.   HEMOGLOBIN AND HEMATOCRIT, BLOOD   MAGNESIUM   TYPE AND SCREEN   ABORH 2ND SPECIMEN VERIFICATION   CBC AND DIFFERENTIAL    Narrative:     The following orders were created for panel order CBC & Differential.  Procedure                               Abnormality         Status                     ---------                               -----------         ------                     CBC Auto Differential[[id number]]        Abnormal            Final result                 Please view results for these tests on the individual orders.   GREEN TOP   LAVENDER TOP   GOLD TOP - SST   GRAY TOP   LIGHT BLUE TOP   CBC AND DIFFERENTIAL    Narrative:     The following orders were created for panel order CBC & Differential.  Procedure                               Abnormality         Status                     ---------                               -----------         ------                     CBC Auto Differential[[id number]]        Abnormal            Final result                 Please view results for these tests on the individual orders.       Meds Given in ED:   Medications   Sodium Chloride (PF) 0.9 % 10 mL (has no administration in time range)   sodium chloride 0.9 % infusion (75 mL/hr Intravenous New Bag 7/24/25 1042)   ondansetron (ZOFRAN) injection 4 mg (has no administration in time range)   albuterol (PROVENTIL) nebulizer solution 0.083% 2.5 mg/3mL (has no administration in time range)   aspirin EC tablet 81 mg (has no  administration in time range)   atorvastatin (LIPITOR) tablet 80 mg (has no administration in time range)   carvedilol (COREG) tablet 6.25 mg (has no administration in time range)   clopidogrel (PLAVIX) tablet 75 mg ( Oral Dose Auto Held 8/1/25 0900)   furosemide (LASIX) tablet 20 mg ( Oral Held by provider 7/24/25 0946)   levothyroxine (SYNTHROID, LEVOTHROID) tablet 75 mcg (has no administration in time range)   nitroglycerin (NITROSTAT) SL tablet 0.4 mg (has no administration in time range)   sacubitril-valsartan (ENTRESTO) 24-26 MG tablet 0.5 tablet (has no administration in time range)   spironolactone (ALDACTONE) tablet 25 mg ( Oral Dose Auto Held 8/1/25 0900)   terazosin (HYTRIN) capsule 1 mg (has no administration in time range)   arformoterol (BROVANA) nebulizer solution 15 mcg (has no administration in time range)     And   revefenacin (YUPELRI) nebulizer solution 175 mcg (has no administration in time range)   sodium chloride 0.9 % flush 10 mL (10 mL Intravenous Given 7/24/25 1043)   sodium chloride 0.9 % flush 10 mL (has no administration in time range)   sodium chloride 0.9 % infusion 40 mL (has no administration in time range)   Magnesium Standard Dose Replacement - Follow Nurse / BPA Driven Protocol (has no administration in time range)   magnesium sulfate 2g/50 mL (PREMIX) infusion (2 g Intravenous New Bag 7/24/25 1046)   sodium chloride 0.9 % bolus 1,000 mL (0 mL Intravenous Stopped 7/23/25 2238)   ondansetron (ZOFRAN) injection 4 mg (4 mg Intravenous Given 7/23/25 2153)   dicyclomine (BENTYL) injection 20 mg (20 mg Intramuscular Given 7/23/25 2246)   metoclopramide (REGLAN) injection 10 mg (10 mg Intravenous Given 7/24/25 0115)     sodium chloride, 75 mL/hr, Last Rate: 75 mL/hr (07/24/25 1042)         Last NIH score:                                                          Dysphagia screening results:        Maria Isabel Coma Scale:  No data recorded     CIWA:        Restraint Type:            Isolation  Status:  No active isolations

## 2025-07-24 NOTE — PROGRESS NOTES
[facility]  Heart Failure Clinic    Cardiologist/EP:   PCP: Pramod Blevins MD          Chief Complaint  Congestive Heart Failure    PROBLEM LIST:  CAD  [facility] for MI, 1977, Dr. Carlos Gilmore: Occluded LAD with reported collaterals per patient report, normal LVEF.  MPS, 2014: EF 50%, anterior apical scar. No ischemia.  US aorta, 04/27/2016: No abdominal aortic aneurysm.  Exercise MPS, 4/1/2019: prior infarction of the anterior apex, and anterior septum with no significant ischemia noted. EF 40%. Severe hypokinesis.  5/9/2023 MPS with infarct of anterior and significant ischemia.  EF 32%.  Severe hypokinesis of anterior and septal wall.  [facility] 6/10/2025 LAD stenosis status post drug-eluting stent, EF 25 to 30% with hypokinetic anterior wall  HFrEF/ischemic cardiomyopathy  5/9/2025 ECHO EF 31-35%. No significant valve disease. With significant WMA of the anterior, septal and apical walls.   Hypertension  Dyslipidemia  Hypothyroidism    Subjective    History of Present Illness    Gee Whitney is a 74 y.o. male who presents today as a hospital follow-up for heart failure.    Patient was admitted 7/21 to 7/23 with diarrhea, found to have E. coli and diarrheal illness and CT.  He received IV fluids but was orthostatic even after fluid resuscitation.  His Entresto was held but resumed at half dose.    He reports SBPs are 150s laying, 130s sitting and 120s standing. He reports 2 weeks ago his spironolactone was stopped because his BPs were in the 80s after exercise. Denies dizziness or lightheadedness        Dyspnea: denies  Lower extremity swelling: denies  Abdominal swelling: denies  Home weight: Current weight is 164. Target weight is 166-170  Home BP/HRs: 130s sitting  Orthopnea/pillows denies/1   Hospital stays: 7/2025 (diarrhea).  Denies hospitalizations for heart failure          Objective     Vital Signs:   Vitals:    07/28/25 1401 07/28/25 1403   BP: 151/65 121/58   BP Location:  "Left arm Left arm   Patient Position: Sitting Sitting   Pulse: 57 70   SpO2: 97%    Weight: 74.4 kg (164 lb)    Height: 177.8 cm (70\")      Body mass index is 23.53 kg/m².  Physical Exam  Vitals reviewed.   Constitutional:       Appearance: Normal appearance.   HENT:      Head: Normocephalic.   Neck:      Vascular: No carotid bruit.   Cardiovascular:      Rate and Rhythm: Normal rate and regular rhythm.      Pulses: Normal pulses.      Heart sounds: Normal heart sounds, S1 normal and S2 normal. No murmur heard.  Pulmonary:      Effort: Pulmonary effort is normal. No respiratory distress.      Breath sounds: Normal breath sounds.   Chest:      Chest wall: No tenderness.   Abdominal:      General: Abdomen is flat.      Palpations: Abdomen is soft.   Musculoskeletal:      Cervical back: Neck supple.      Right lower leg: No edema.      Left lower leg: No edema.   Skin:     General: Skin is warm and dry.   Neurological:      General: No focal deficit present.      Mental Status: He is alert and oriented to person, place, and time. Mental status is at baseline.   Psychiatric:         Mood and Affect: Mood normal.         Behavior: Behavior normal.         Thought Content: Thought content normal.              Data Reviewed:  Lab Results   Component Value Date    GLUCOSE 95 07/25/2025    CALCIUM 8.0 (L) 07/25/2025     07/25/2025    K 4.4 07/25/2025    CO2 19.5 (L) 07/25/2025     (H) 07/25/2025    BUN 25.5 (H) 07/25/2025    CREATININE 1.39 (H) 07/25/2025    EGFR 52.5 (L) 07/25/2025    BCR 18.3 07/25/2025    ANIONGAP 6.5 07/25/2025     Lab Results   Component Value Date    WBC 8.97 07/25/2025    HGB 11.8 (L) 07/25/2025    HCT 35.7 (L) 07/25/2025    MCV 91.3 07/25/2025     07/25/2025     Lab Results   Component Value Date    PROBNP 396.8 06/10/2025            DATE 7/28/2025      ReDs lung fluid volume assessment  (Normal 25-35%) 26%               Assessment and Plan   Chronic HFrEF/ischemic " cardiomyopathy  - LVEF 25-30%, NYHA class II ACC/AHA stage: C  - Euvolemic/stable on exam, ReDs value normal  -Continue the following guideline directed medical therapy:    Drug Class   Drug   Dose Last Dose Adjustment Notes   ACEi/ARB/ARNI Entresto 24/26 mg half tablet twice a day Decreased in the hospital due to orthostatic hypotension 7/2025    Beta Blocker Carvedilol 6.25 mg twice a day     MRA Spironolactone 25 mg daily     SGLT2i       Diuretic regimen Lasix 20 mg as needed     Secondary therapy        - Heart failure education provided today including signs and symptoms, causes of heart failure, medications, daily weights, low sodium diet,  and daily physical activity as tolerated. Reinforced reasons to call and diuretic plan.   - GDMT currently limited by orthostatic hypotension.  Encouraged him to continue to monitor and if systolic blood pressure consistently 130 or greater to call and we will increase his Entresto back to 1 tablet twice a day  - Repeat echo on 9/12    - ReDs Vest    2. Essential hypertension  -Mildly elevated today, but systolic blood pressure dropped 30 points with standing.  Continue home monitoring.  He does tell me he is using an old device and have encouraged him to bring it to his upcoming appointment on Friday with his PCP.  Call if systolic blood pressures consistently greater than 130 sitting and will consider resuming normal Entresto dose.  He will also have a monitor to cardiac rehab            Follow Up {Instructions Charge Capture  Follow-up Communications :23}   Return if symptoms worsen or fail to improve.    I have reviewed this documentation, made edits where appropriate, and agree with the final report of ReDS data/interpretation. In addition, I have the following to add:    Lung fluid content by ReDS assessment correlates to pulmonary capillary wedge pressure (Jozef et al. NIKKO 2018). In patients with LEFT-sided heart failure, elevated readings suggest that the  patient MAY benefit from additional diuresis while low readings suggest that the patient MAY benefit from a reduction in diuretics; clinical correlation is recommended. Low normal and mildly elevated readings may be appropriate for some patients. In patients with RIGHT-sided heart failure, lung fluid content may be a less reliable marker for guiding diuresis.      Patient was given instructions and counseling regarding his condition or for health maintenance advice. Please see specific information pulled into the AVS if appropriate.  Advised to call the [facility] with any questions, concerns, or worsening symptoms.

## 2025-07-25 ENCOUNTER — READMISSION MANAGEMENT (OUTPATIENT)
Dept: CALL CENTER | Facility: HOSPITAL | Age: 75
End: 2025-07-25
Payer: MEDICARE

## 2025-07-25 ENCOUNTER — DOCUMENTATION (OUTPATIENT)
Dept: CARDIAC REHAB | Facility: HOSPITAL | Age: 75
End: 2025-07-25
Payer: MEDICARE

## 2025-07-25 ENCOUNTER — APPOINTMENT (OUTPATIENT)
Dept: CARDIAC REHAB | Facility: HOSPITAL | Age: 75
End: 2025-07-25
Payer: MEDICARE

## 2025-07-25 VITALS
DIASTOLIC BLOOD PRESSURE: 64 MMHG | TEMPERATURE: 98.1 F | WEIGHT: 167 LBS | HEART RATE: 50 BPM | SYSTOLIC BLOOD PRESSURE: 144 MMHG | BODY MASS INDEX: 23.91 KG/M2 | OXYGEN SATURATION: 98 % | HEIGHT: 70 IN | RESPIRATION RATE: 18 BRPM

## 2025-07-25 PROBLEM — K92.1 HEMATOCHEZIA: Status: RESOLVED | Noted: 2025-07-24 | Resolved: 2025-07-25

## 2025-07-25 PROBLEM — R11.2 N&V (NAUSEA AND VOMITING): Status: RESOLVED | Noted: 2025-07-24 | Resolved: 2025-07-25

## 2025-07-25 LAB
ANION GAP SERPL CALCULATED.3IONS-SCNC: 6.5 MMOL/L (ref 5–15)
BUN SERPL-MCNC: 25.5 MG/DL (ref 8–23)
BUN/CREAT SERPL: 18.3 (ref 7–25)
CALCIUM SPEC-SCNC: 8 MG/DL (ref 8.6–10.5)
CHLORIDE SERPL-SCNC: 113 MMOL/L (ref 98–107)
CO2 SERPL-SCNC: 19.5 MMOL/L (ref 22–29)
CREAT SERPL-MCNC: 1.39 MG/DL (ref 0.76–1.27)
DEPRECATED RDW RBC AUTO: 46.1 FL (ref 37–54)
EGFRCR SERPLBLD CKD-EPI 2021: 52.5 ML/MIN/1.73
ERYTHROCYTE [DISTWIDTH] IN BLOOD BY AUTOMATED COUNT: 13.7 % (ref 12.3–15.4)
GLUCOSE BLDC GLUCOMTR-MCNC: 84 MG/DL (ref 70–130)
GLUCOSE SERPL-MCNC: 95 MG/DL (ref 65–99)
HCT VFR BLD AUTO: 35.7 % (ref 37.5–51)
HCT VFR BLD AUTO: 36.3 % (ref 37.5–51)
HGB BLD-MCNC: 11.7 G/DL (ref 13–17.7)
HGB BLD-MCNC: 11.8 G/DL (ref 13–17.7)
MAGNESIUM SERPL-MCNC: 2.3 MG/DL (ref 1.6–2.4)
MCH RBC QN AUTO: 30.2 PG (ref 26.6–33)
MCHC RBC AUTO-ENTMCNC: 33.1 G/DL (ref 31.5–35.7)
MCV RBC AUTO: 91.3 FL (ref 79–97)
PLATELET # BLD AUTO: 141 10*3/MM3 (ref 140–450)
PMV BLD AUTO: 8.7 FL (ref 6–12)
POTASSIUM SERPL-SCNC: 4.4 MMOL/L (ref 3.5–5.2)
QT INTERVAL: 450 MS
QTC INTERVAL: 468 MS
RBC # BLD AUTO: 3.91 10*6/MM3 (ref 4.14–5.8)
SODIUM SERPL-SCNC: 139 MMOL/L (ref 136–145)
WBC NRBC COR # BLD AUTO: 8.97 10*3/MM3 (ref 3.4–10.8)

## 2025-07-25 PROCEDURE — 85018 HEMOGLOBIN: CPT | Performed by: INTERNAL MEDICINE

## 2025-07-25 PROCEDURE — 82948 REAGENT STRIP/BLOOD GLUCOSE: CPT

## 2025-07-25 PROCEDURE — 94664 DEMO&/EVAL PT USE INHALER: CPT

## 2025-07-25 PROCEDURE — 99239 HOSP IP/OBS DSCHRG MGMT >30: CPT | Performed by: FAMILY MEDICINE

## 2025-07-25 PROCEDURE — 94799 UNLISTED PULMONARY SVC/PX: CPT

## 2025-07-25 PROCEDURE — 85014 HEMATOCRIT: CPT | Performed by: INTERNAL MEDICINE

## 2025-07-25 PROCEDURE — 83735 ASSAY OF MAGNESIUM: CPT | Performed by: INTERNAL MEDICINE

## 2025-07-25 PROCEDURE — G0378 HOSPITAL OBSERVATION PER HR: HCPCS

## 2025-07-25 PROCEDURE — 80048 BASIC METABOLIC PNL TOTAL CA: CPT | Performed by: INTERNAL MEDICINE

## 2025-07-25 PROCEDURE — 25810000003 SODIUM CHLORIDE 0.9 % SOLUTION: Performed by: INTERNAL MEDICINE

## 2025-07-25 PROCEDURE — 85027 COMPLETE CBC AUTOMATED: CPT | Performed by: INTERNAL MEDICINE

## 2025-07-25 RX ORDER — ONDANSETRON 4 MG/1
4 TABLET, FILM COATED ORAL EVERY 8 HOURS PRN
Qty: 15 TABLET | Refills: 0 | Status: SHIPPED | OUTPATIENT
Start: 2025-07-25

## 2025-07-25 RX ADMIN — LEVOTHYROXINE SODIUM 75 MCG: 0.07 TABLET ORAL at 06:19

## 2025-07-25 RX ADMIN — ASPIRIN 81 MG: 81 TABLET, COATED ORAL at 09:13

## 2025-07-25 RX ADMIN — ARFORMOTEROL TARTRATE 15 MCG: 15 SOLUTION RESPIRATORY (INHALATION) at 09:17

## 2025-07-25 RX ADMIN — CARVEDILOL 6.25 MG: 6.25 TABLET, FILM COATED ORAL at 09:13

## 2025-07-25 RX ADMIN — SODIUM CHLORIDE 75 ML/HR: 9 INJECTION, SOLUTION INTRAVENOUS at 01:10

## 2025-07-25 RX ADMIN — Medication 10 ML: at 09:14

## 2025-07-25 RX ADMIN — SACUBITRIL AND VALSARTAN 0.5 TABLET: 24; 26 TABLET, FILM COATED ORAL at 09:13

## 2025-07-25 NOTE — PLAN OF CARE
Problem: Adult Inpatient Plan of Care  Goal: Absence of Hospital-Acquired Illness or Injury  Intervention: Identify and Manage Fall Risk  Recent Flowsheet Documentation  Taken 7/25/2025 0600 by Marry Doshi RN  Safety Promotion/Fall Prevention:   activity supervised   clutter free environment maintained   assistive device/personal items within reach   fall prevention program maintained   muscle strengthening facilitated   nonskid shoes/slippers when out of bed   room organization consistent   safety round/check completed  Taken 7/25/2025 0400 by Marry Doshi RN  Safety Promotion/Fall Prevention:   activity supervised   clutter free environment maintained   assistive device/personal items within reach   fall prevention program maintained   muscle strengthening facilitated   nonskid shoes/slippers when out of bed   room organization consistent   safety round/check completed  Taken 7/25/2025 0200 by Marry Doshi RN  Safety Promotion/Fall Prevention:   activity supervised   clutter free environment maintained   assistive device/personal items within reach   fall prevention program maintained   muscle strengthening facilitated   nonskid shoes/slippers when out of bed   room organization consistent   safety round/check completed  Taken 7/25/2025 0000 by Marry Doshi RN  Safety Promotion/Fall Prevention:   activity supervised   clutter free environment maintained   assistive device/personal items within reach   fall prevention program maintained   muscle strengthening facilitated   nonskid shoes/slippers when out of bed   room organization consistent   safety round/check completed  Taken 7/24/2025 2200 by Marry Doshi RN  Safety Promotion/Fall Prevention:   activity supervised   clutter free environment maintained   assistive device/personal items within reach   fall prevention program maintained   muscle strengthening facilitated   nonskid shoes/slippers when out of bed   room organization consistent   safety  round/check completed  Taken 7/24/2025 2023 by Marry Doshi RN  Safety Promotion/Fall Prevention:   activity supervised   clutter free environment maintained   assistive device/personal items within reach   fall prevention program maintained   muscle strengthening facilitated   nonskid shoes/slippers when out of bed   room organization consistent   safety round/check completed  Intervention: Prevent Skin Injury  Recent Flowsheet Documentation  Taken 7/25/2025 0600 by Marry Doshi RN  Body Position: position changed independently  Taken 7/25/2025 0400 by Marry Doshi RN  Body Position: position changed independently  Taken 7/25/2025 0200 by Marry Doshi RN  Body Position: position changed independently  Taken 7/25/2025 0000 by Marry Doshi RN  Body Position: position changed independently  Taken 7/24/2025 2200 by Marry Doshi RN  Body Position: position changed independently  Taken 7/24/2025 2023 by Marry Doshi RN  Body Position: position changed independently  Skin Protection: incontinence pads utilized  Intervention: Prevent and Manage VTE (Venous Thromboembolism) Risk  Recent Flowsheet Documentation  Taken 7/24/2025 2023 by Marry Doshi RN  VTE Prevention/Management: SCDs (sequential compression devices) off  Intervention: Prevent Infection  Recent Flowsheet Documentation  Taken 7/25/2025 0600 by Marry Doshi RN  Infection Prevention:   cohorting utilized   environmental surveillance performed   equipment surfaces disinfected   personal protective equipment utilized   rest/sleep promoted   hand hygiene promoted   single patient room provided  Taken 7/25/2025 0400 by Marry Doshi RN  Infection Prevention:   cohorting utilized   environmental surveillance performed   equipment surfaces disinfected   personal protective equipment utilized   rest/sleep promoted   hand hygiene promoted   single patient room provided  Taken 7/25/2025 0200 by Marry Doshi RN  Infection Prevention:   cohorting  utilized   environmental surveillance performed   equipment surfaces disinfected   personal protective equipment utilized   rest/sleep promoted   hand hygiene promoted   single patient room provided  Taken 7/25/2025 0000 by Marry Doshi RN  Infection Prevention:   cohorting utilized   environmental surveillance performed   equipment surfaces disinfected   personal protective equipment utilized   rest/sleep promoted   hand hygiene promoted   single patient room provided  Taken 7/24/2025 2200 by Marry Doshi RN  Infection Prevention:   cohorting utilized   environmental surveillance performed   equipment surfaces disinfected   personal protective equipment utilized   rest/sleep promoted   hand hygiene promoted   single patient room provided  Taken 7/24/2025 2023 by Marry Doshi RN  Infection Prevention: environmental surveillance performed  Goal: Optimal Comfort and Wellbeing  Intervention: Provide Person-Centered Care  Recent Flowsheet Documentation  Taken 7/24/2025 2023 by Marry Doshi RN  Trust Relationship/Rapport:   care explained   questions encouraged  Goal: Readiness for Transition of Care  Intervention: Mutually Develop Transition Plan  Recent Flowsheet Documentation  Taken 7/25/2025 0620 by Marry Doshi RN  Transportation Anticipated: car, drives self  Transportation Concerns: none  Patient/Family Anticipated Services at Transition: none  Patient/Family Anticipates Transition to: home with family   Goal Outcome Evaluation:   Patient progressing, calm and cooperative, needs met, call light within reach.

## 2025-07-25 NOTE — OUTREACH NOTE
Prep Survey      Flowsheet Row Responses   [facility] discharged from? [facility]   Is LACE score < 7 ? No   Eligibility Readm Mgmt   Discharge diagnosis N&V (nausea and vomiting)   Does the patient have one of the following disease processes/diagnoses(primary or secondary)? Other   Prep survey completed? Yes            Zoey WILLIAMSON - Registered Nurse

## 2025-07-25 NOTE — PROGRESS NOTES
Patient called to cancel todays cardiac rehab session due to hospitalization. Patient plans to return on 8/1/25.

## 2025-07-25 NOTE — PLAN OF CARE
Problem: Adult Inpatient Plan of Care  Goal: Plan of Care Review  Outcome: Met  Goal: Patient-Specific Goal (Individualized)  Outcome: Met  Goal: Absence of Hospital-Acquired Illness or Injury  Outcome: Met  Intervention: Identify and Manage Fall Risk  Recent Flowsheet Documentation  Taken 7/25/2025 1200 by Lavonne Ponce RN  Safety Promotion/Fall Prevention:   activity supervised   safety round/check completed  Taken 7/25/2025 1000 by Lavonne Ponce RN  Safety Promotion/Fall Prevention:   activity supervised   safety round/check completed  Taken 7/25/2025 0800 by Lavonne Ponce RN  Safety Promotion/Fall Prevention:   activity supervised   safety round/check completed  Intervention: Prevent Skin Injury  Recent Flowsheet Documentation  Taken 7/25/2025 1200 by Lavonne Ponce RN  Body Position: position changed independently  Skin Protection: transparent dressing maintained  Taken 7/25/2025 1000 by Lavonne Ponce RN  Body Position: position changed independently  Skin Protection: transparent dressing maintained  Taken 7/25/2025 0800 by Lavonne Ponce RN  Body Position: position changed independently  Skin Protection: incontinence pads utilized  Goal: Optimal Comfort and Wellbeing  Outcome: Met  Goal: Readiness for Transition of Care  Outcome: Met     Problem: Pain Acute  Goal: Optimal Pain Control and Function  Outcome: Met     Problem: Fall Injury Risk  Goal: Absence of Fall and Fall-Related Injury  Outcome: Met  Intervention: Promote Injury-Free Environment  Recent Flowsheet Documentation  Taken 7/25/2025 1200 by Lavonne Ponce RN  Safety Promotion/Fall Prevention:   activity supervised   safety round/check completed  Taken 7/25/2025 1000 by Lavonne Ponce RN  Safety Promotion/Fall Prevention:   activity supervised   safety round/check completed  Taken 7/25/2025 0800 by Lavonne Ponce RN  Safety Promotion/Fall Prevention:   activity supervised   safety round/check completed   Goal Outcome Evaluation:      Aox4,  VSS, sinus elba on monitor. Patient given discharge instructions including medications and appointments. Questions encouraged. Safety in place. Care plan complete. Waiting on transport

## 2025-07-25 NOTE — DISCHARGE SUMMARY
[facility] Medicine Services  DISCHARGE SUMMARY    Patient Name: Gee Whitney  : 1950  MRN: [id number]    Date of Admission: 2025 12:49 AM  Date of Discharge:  2025  Primary Care Physician: Pramod Blevins MD    Consults       No orders found for last 30 day(s).            Hospital Course       Active Hospital Problems    Diagnosis  POA    Enteritis [K52.9]  Unknown    Diarrhea [R19.7]  Yes    HFrEF (heart failure with reduced ejection fraction) [I50.20]  Yes    Hypothyroidism [E03.9]  Yes    Coronary artery disease [I25.10]  Yes      Resolved Hospital Problems    Diagnosis Date Resolved POA    **N&V (nausea and vomiting) [R11.2] 2025 Yes    Hematochezia [K92.1] 2025 Unknown          Hospital Course:  Gee Whitney is a 74 y.o. male  w/ hx of CAD (PCI/KARSTEN to LAD), chronic HFrEF, hypothyroidism, HTN, HLP who was just admitted -25 with nausea, chills & diarrhea. Patient had played a day of golf outside in hot weather 2 days prior to symptom onset.wife not ill.  CT a/p was negative for acute illness. Gi pcr panel was + for enteroaggregative e.coli. initially the diarrhea was nonbloody. While in the hospital did have a bloody BM, but went >1.5 days without diarrhea and felt subjectively improved. Received zithromax x 3 doses while in hospital. As symptoms had significantly improved patient was d/c'd [facility] on 25. After arriving home, developed n/v and had bloody BM, although not as bloody as was while in hospital initially. No abd pain. No dysuria. No fever. Presented back to [facility] ED due to symptoms.     Ongoing Enteritis  N/V -> resolved  Bloody diarrhea (improving)  Enteroaggregative E.coli (in stool GI pcr panel 25)  -s/p 3 doses of zithromax - prior to discharge home  -ct a/p on  without acute abnormality  -suspect combination of enteroaggregative e.coli +/- possible component of ischemic colitis (had played golf outside  in hot weather day prior to symptom onset)  -tolerating clear liquids, advance diet  -No blood BM overnight   -Discussed diarrhea may persist for 2 weeks/wax and wane      CAD  Chronic HFrEF (ef 31-35 last echo)  HL  -continue home meds     Hypothyroidism  -continue levothyroxine  -tsh ok in June 2025     CKD 3 (baseline cr ~1.3-1.5)  -stable, monitor    Discharge Follow Up Recommendations for outpatient labs/diagnostics:  -PCP 1 week     Day of Discharge     HPI:   Patient seen and examined. No new issues overnight. Ate all of his breakfast tray/clear liquids. No N/V/D. States had some flatus this morning and some small liquid after but no blood. Discussed importance of hydration.     Review of Systems  Gen- No fevers, chills  CV- No chest pain, palpitations  Resp- No cough, dyspnea  GI- No N/V/D, abd pain    Vital Signs:   Temp:  [97.9 °F (36.6 °C)-99.1 °F (37.3 °C)] 98 °F (36.7 °C)  Heart Rate:  [55-85] 70  Resp:  [16-20] 16  BP: (113-151)/(53-88) 151/88      Physical Exam:  Constitutional: No acute distress, awake, alert  HENT: NCAT, mucous membranes moist  Respiratory: Clear to auscultation bilaterally, respiratory effort normal RA  Cardiovascular: RRR, no murmurs, rubs, or gallops  Gastrointestinal: Positive bowel sounds, soft, nontender, nondistended  Musculoskeletal: No bilateral ankle edema  Psychiatric: Appropriate affect, cooperative  Neurologic: Oriented x 3, strength symmetric in all extremities, Cranial Nerves grossly intact to confrontation, speech clear  Skin: No rashes     Pertinent  and/or Most Recent Results     LAB RESULTS:      Lab 07/25/25  0540 07/25/25  0414 07/24/25  0856 07/23/25  2133 07/22/25  0618 07/21/25  2111 07/21/25  0611   WBC 8.97  --  10.44 10.59 10.50  --  8.69   HEMOGLOBIN 11.8* 11.7* 13.5 15.6 12.3*   < > 15.1   HEMATOCRIT 35.7* 36.3* 40.7 47.9 36.9*   < > 45.1   PLATELETS 141  --  172 179 140  --  169   NEUTROS ABS  --   --  7.09* 8.21*  --   --  7.20*   IMMATURE GRANS (ABS)   --   --  0.05 0.04  --   --  0.03   LYMPHS ABS  --   --  1.21 1.03  --   --  0.78   MONOS ABS  --   --  1.61* 0.96*  --   --  0.57   EOS ABS  --   --  0.47* 0.32  --   --  0.10   MCV 91.3  --  91.5 92.8 92.3  --  91.5   LACTATE  --   --   --  1.9  --   --  1.3    < > = values in this interval not displayed.         Lab 07/25/25  0541 07/24/25  1233 07/24/25  0856 07/23/25 2133 07/23/25  0526 07/22/25  0617   SODIUM 139  --  139 139 138 134*   POTASSIUM 4.4  --  4.5 4.7 4.4 4.6   CHLORIDE 113*  --  112* 110* 112* 109*   CO2 19.5*  --  16.5* 16.8* 19.4* 17.0*   ANION GAP 6.5  --  10.5 12.2 6.6 8.0   BUN 25.5*  --  30.5* 23.5* 21.5 29.2*   CREATININE 1.39*  --  1.83* 1.51* 1.26 1.37*   EGFR 52.5*  --  37.8* 47.6* 59.1* 53.5*   GLUCOSE 95  --  100* 130* 94 92   CALCIUM 8.0*  --  8.4* 8.8 8.4* 7.9*   MAGNESIUM 2.3 2.4 1.5*  --   --   --          Lab 07/24/25  0856 07/23/25 2133 07/22/25  0617 07/21/25  0611   TOTAL PROTEIN 5.0* 5.9* 4.6* 5.8*   ALBUMIN 3.2* 3.6 3.1* 4.0   GLOBULIN 1.8 2.3 1.5 1.8   ALT (SGPT) 9 8 7 16   AST (SGOT) 14 22 11 20   BILIRUBIN 0.3 0.3 0.5 0.4   ALK PHOS 53 65 54 65   LIPASE 19 27  --  119*         Lab 07/21/25  1214 07/21/25  0907 07/21/25  0711   HSTROP T 32* 33* 21             Lab 07/24/25  1233 07/24/25  0922   ABO TYPING A A   RH TYPING Positive Positive   ANTIBODY SCREEN  --  Negative         Brief Urine Lab Results  (Last result in the past 365 days)        Color   Clarity   Blood   Leuk Est   Nitrite   Protein   CREAT   Urine HCG        07/23/25 2252 Yellow   Clear   Negative   Small (1+)   Negative   100 mg/dL (2+)                 Microbiology Results (last 10 days)       Procedure Component Value - Date/Time    Blood Culture - Blood, Hand, Left [[id number]]  (Normal) Collected: 07/22/25 0140    Lab Status: Preliminary result Specimen: Blood from Hand, Left Updated: 07/25/25 0345     Blood Culture No growth at 3 days    Narrative:      Less than seven (7) mL's of blood was collected.   Insufficient quantity may yield false negative results.    Blood Culture - Blood, Arm, Left [[id number]]  (Normal) Collected: 07/22/25 0135    Lab Status: Preliminary result Specimen: Blood from Arm, Left Updated: 07/25/25 0345     Blood Culture No growth at 3 days    Narrative:      Less than seven (7) mL's of blood was collected.  Insufficient quantity may yield false negative results.    Gastrointestinal Panel, PCR - Stool, Per Rectum [[id number]]  (Abnormal) Collected: 07/21/25 0801    Lab Status: Final result Specimen: Stool from Per Rectum Updated: 07/21/25 1026     Campylobacter Not Detected     Plesiomonas shigelloides Not Detected     Salmonella Not Detected     Vibrio Not Detected     Vibrio cholerae Not Detected     Yersinia enterocolitica Not Detected     Enteroaggregative E. coli (EAEC) Detected     Enteropathogenic E. coli (EPEC) Not Detected     Enterotoxigenic E. coli (ETEC) lt/st Not Detected     Shiga-like toxin-producing E. coli (STEC) stx1/stx2 Not Detected     Shigella/Enteroinvasive E. coli (EIEC) Not Detected     Cryptosporidium Not Detected     Cyclospora cayetanensis Not Detected     Entamoeba histolytica Not Detected     Giardia lamblia Not Detected     Adenovirus F40/41 Not Detected     Astrovirus Not Detected     Norovirus GI/GII Not Detected     Rotavirus A Not Detected     Sapovirus (I, II, IV or V) Not Detected            CT Abdomen Pelvis With Contrast  Result Date: 7/21/2025  CT ABDOMEN PELVIS W CONTRAST Date of Exam: 7/21/2025 6:50 AM EDT Indication: [facility] abdominal pain, hypotension, diarrhea. Comparison: CT abdomen and pelvis 3/14/2023 Technique: Axial CT images were obtained of the abdomen and pelvis following the uneventful intravenous administration of 85 mL Isovue-300. Reconstructed coronal and sagittal images were also obtained. Automated exposure control and iterative construction methods were used. Findings: Unremarkable appearance of the lower thorax. Unremarkable appearance  of the liver, gallbladder, bile ducts, spleen, pancreas, and adrenal glands. Redemonstration of multiple bilateral exophytic simple renal cysts. Normal appearance of the ureters and decompressed bladder. The prostate is enlarged. Normal appearance of the seminal vesicles. There is fluid/unformed stool within the colon compatible with diarrheal state without apparent inflammatory change of the colon. No bowel obstruction. No definite  inflammatory change of the GI tract. No abdominopelvic free fluid. No pneumoperitoneum. No abdominopelvic fat stranding. There is atherosclerosis of the aorta and iliac branches with otherwise grossly unremarkable appearance of the vasculature. No bulky  or suspicious abdominopelvic lymph nodes. Unremarkable appearance of the body wall soft tissues. No acute or suspicious bony findings.     Impression: No acute abdominopelvic findings. Fluid/unformed stool within the colon compatible with diarrheal state. Electronically Signed: Cali Singh MD  7/21/2025 7:16 AM EDT  Workstation ID: [id number]              Results for orders placed during the hospital encounter of 05/09/25    Adult Transthoracic Echo Complete W/ Cont if Necessary Per Protocol 05/09/2025 12:04 PM    Interpretation Summary    Left ventricular systolic function is moderately decreased.Left ventricular ejection fraction appears to be 31 - 35%.    The following left ventricular wall segments are dyskinetic: mid anteroseptal. The following left ventricular wall segments are akinetic: mid anterior, apical anterior, apical septal and apex. The following left ventricular wall segments are aneurysmal: mid anteroseptal.    No hemodynamically significant valvular heart      Plan for Follow-up of Pending Labs/Results: Inbox     Discharge Details        Discharge Medications        New Medications        Instructions Start Date   ondansetron 4 MG tablet  Commonly known as: Zofran   4 mg, Oral, Every 8 Hours PRN             Continue  These Medications        Instructions Start Date   albuterol sulfate  (90 Base) MCG/ACT inhaler  Commonly known as: PROVENTIL HFA;VENTOLIN HFA;PROAIR HFA   2 puffs, Inhalation, Every 4 Hours PRN      aspirin 81 MG EC tablet   81 mg, Oral, Daily      atorvastatin 40 MG tablet  Commonly known as: LIPITOR   40 mg, Nightly      carvedilol 6.25 MG tablet  Commonly known as: COREG   6.25 mg, Oral, 2 Times Daily      clopidogrel 75 MG tablet  Commonly known as: PLAVIX   75 mg, Oral, Daily      Fish Oil 1200 MG capsule delayed-release capsule   1,200 mg, Daily      furosemide 20 MG tablet  Commonly known as: LASIX   20 mg, As Needed      ICAPS AREDS 2 PO   2 tablets, Daily      ipratropium 0.06 % nasal spray  Commonly known as: ATROVENT   2 sprays, 3 Times Daily      levothyroxine 75 MCG tablet  Commonly known as: SYNTHROID, LEVOTHROID   75 mcg, Daily      MIRALAX PO   Every Other Day      nitroglycerin 0.4 MG SL tablet  Commonly known as: NITROSTAT   0.4 mg, Every 5 Minutes PRN      sacubitril-valsartan 24-26 MG tablet  Commonly known as: ENTRESTO   0.5 tablets, Oral, Every 12 Hours Scheduled      spironolactone 25 MG tablet  Commonly known as: ALDACTONE   25 mg, Oral, Daily      Stiolto Respimat 2.5-2.5 MCG/ACT aerosol solution inhaler  Generic drug: tiotropium bromide-olodaterol   2 puffs, Inhalation, Daily PRN      terazosin 1 MG capsule  Commonly known as: HYTRIN   1 mg, Oral, Nightly      traZODone 50 MG tablet  Commonly known as: DESYREL   1 tablet, Nightly      vitamin D 1.25 MG (24471 UT) capsule capsule  Commonly known as: ERGOCALCIFEROL   50,000 Units, Weekly               No Known Allergies      Discharge Disposition:  Home or Self Care    Diet:  Hospital:  Diet Order   Procedures    Diet: Cardiac; Healthy Heart (2-3 Na+); Fluid Consistency: Thin (IDDSI 0)            Activity:      Restrictions or Other Recommendations:       CODE STATUS:    Code Status and Medical Interventions: CPR (Attempt to  Resuscitate); Full Support   Ordered at: 07/24/25 0901     Code Status (Patient has no pulse and is not breathing):    CPR (Attempt to Resuscitate)     Medical Interventions (Patient has pulse or is breathing):    Full Support       Future Appointments   Date Time Provider Department Center   7/28/2025  1:30 PM Yisel Reddy APRN [facility]   8/4/2025  9:00 AM [facility]   8/6/2025  9:00 AM [facility]  [facility]   8/8/2025  9:00 AM [facility]  [facility]   8/11/2025  9:00 AM [facility]  [facility]   8/13/2025  9:00 AM [facility]  [facility]   8/15/2025  9:00 AM [facility]  [facility]   8/18/2025  9:00 AM [facility]  [facility]   8/20/2025  9:00 AM [facility]  [facility]   8/22/2025  9:00 AM [facility]  [facility]   8/25/2025  9:00 AM [facility]  [facility]   8/27/2025  9:00 AM [facility]  [facility]   8/29/2025  9:00 AM [facility]  [facility]   9/3/2025  9:00 AM [facility]  [facility]   9/5/2025  9:00 AM [facility]  [facility]   9/8/2025  9:00 AM [facility] II [facility]  [facility]   9/10/2025  9:00 AM [facility] II [facility]  [facility]   9/12/2025  9:00 AM [facility]  [facility]   9/12/2025 10:00 AM [facility] OP ECHO CART RM 3  [facility]  [facility]   9/15/2025  9:00 AM Shaun Joseph MD [facility]   9/15/2025  9:00 AM [facility]  [facility]   9/17/2025  9:00 AM [facility] PH[facility] II [facility]  [facility]   9/19/2025  8:00 AM [facility] PH[facility] II [facility]  [facility]   9/19/2025  9:30 AM RAD TECH PULMO [facility]   9/19/2025  9:45 AM Gab Brooke MD [facility]   9/22/2025  9:00 AM [facility] PH[facility] II [facility]  [facility]  [facility]   9/24/2025  9:00 AM [facility] PH[facility] II [facility]   9/26/2025  9:00 AM [facility] PH[facility] II [facility]   9/29/2025  9:00 AM [facility] PH[facility] II [facility]   10/1/2025  9:00 AM [facility] PH[facility] II [facility]   10/3/2025  9:00 AM [facility] PHASE II [facility]   10/6/2025  9:00 AM [facility] PH[facility] II [facility]   10/8/2025  9:00 AM [facility] PHASE II [facility]ARD RE[facility]   10/10/2025  9:00 AM [facility] PHASE II [facility]ARD RE[facility]   10/13/2025  9:00 AM [facility] PH[facility] II [facility]   10/15/2025  9:00 AM [facility] PH[facility] II [facility]  [facility]   10/17/2025  9:00 AM [facility] PH[facility] II [facility]       Additional Instructions for the Follow-ups that You Need to Schedule       Discharge Follow-up with PCP   As directed       Currently Documented PCP:    Pramod Blevins MD    PCP Phone Number:    [phone]     Follow Up Details: 1 week                      Lavonne Dickinson DO  07/25/25      Time Spent on Discharge:  I spent  45  minutes on this discharge activity which included: face-to-face encounter with the patient, reviewing the data in the system, coordination of the care with the nursing staff as well as consultants, documentation, and entering orders.

## 2025-07-27 LAB
BACTERIA SPEC AEROBE CULT: NORMAL
BACTERIA SPEC AEROBE CULT: NORMAL

## 2025-07-28 ENCOUNTER — OFFICE VISIT (OUTPATIENT)
Dept: CARDIOLOGY | Facility: HOSPITAL | Age: 75
End: 2025-07-28
Payer: MEDICARE

## 2025-07-28 ENCOUNTER — APPOINTMENT (OUTPATIENT)
Dept: CARDIAC REHAB | Facility: HOSPITAL | Age: 75
End: 2025-07-28
Payer: MEDICARE

## 2025-07-28 VITALS
DIASTOLIC BLOOD PRESSURE: 58 MMHG | BODY MASS INDEX: 23.48 KG/M2 | SYSTOLIC BLOOD PRESSURE: 121 MMHG | HEART RATE: 70 BPM | HEIGHT: 70 IN | OXYGEN SATURATION: 97 % | WEIGHT: 164 LBS

## 2025-07-28 DIAGNOSIS — I10 ESSENTIAL HYPERTENSION: ICD-10-CM

## 2025-07-28 DIAGNOSIS — I50.22 CHRONIC HFREF (HEART FAILURE WITH REDUCED EJECTION FRACTION): Primary | ICD-10-CM

## 2025-07-28 LAB — ABSOLUTE LUNG FLUID CONTENT: 26 % (ref 20–35)

## 2025-07-28 PROCEDURE — 3074F SYST BP LT 130 MM HG: CPT | Performed by: NURSE PRACTITIONER

## 2025-07-28 PROCEDURE — 3078F DIAST BP <80 MM HG: CPT | Performed by: NURSE PRACTITIONER

## 2025-07-28 PROCEDURE — 1160F RVW MEDS BY RX/DR IN RCRD: CPT | Performed by: NURSE PRACTITIONER

## 2025-07-28 PROCEDURE — 99214 OFFICE O/P EST MOD 30 MIN: CPT | Performed by: NURSE PRACTITIONER

## 2025-07-28 PROCEDURE — 1159F MED LIST DOCD IN RCRD: CPT | Performed by: NURSE PRACTITIONER

## 2025-07-30 ENCOUNTER — APPOINTMENT (OUTPATIENT)
Dept: CARDIAC REHAB | Facility: HOSPITAL | Age: 75
End: 2025-07-30
Payer: MEDICARE

## 2025-08-04 ENCOUNTER — TREATMENT (OUTPATIENT)
Dept: CARDIAC REHAB | Facility: HOSPITAL | Age: 75
End: 2025-08-04
Payer: MEDICARE

## 2025-08-04 DIAGNOSIS — Z95.5 STENTED CORONARY ARTERY: Primary | ICD-10-CM

## 2025-08-04 PROCEDURE — 93798 PHYS/QHP OP CAR RHAB W/ECG: CPT

## 2025-08-06 ENCOUNTER — TREATMENT (OUTPATIENT)
Dept: CARDIAC REHAB | Facility: HOSPITAL | Age: 75
End: 2025-08-06
Payer: MEDICARE

## 2025-08-06 ENCOUNTER — READMISSION MANAGEMENT (OUTPATIENT)
Dept: CALL CENTER | Facility: HOSPITAL | Age: 75
End: 2025-08-06
Payer: MEDICARE

## 2025-08-06 DIAGNOSIS — Z95.5 STENTED CORONARY ARTERY: Primary | ICD-10-CM

## 2025-08-06 PROCEDURE — 93798 PHYS/QHP OP CAR RHAB W/ECG: CPT

## 2025-08-08 ENCOUNTER — TREATMENT (OUTPATIENT)
Dept: CARDIAC REHAB | Facility: HOSPITAL | Age: 75
End: 2025-08-08
Payer: MEDICARE

## 2025-08-08 DIAGNOSIS — Z95.5 STENTED CORONARY ARTERY: Primary | ICD-10-CM

## 2025-08-08 PROCEDURE — 93798 PHYS/QHP OP CAR RHAB W/ECG: CPT

## 2025-08-11 ENCOUNTER — TREATMENT (OUTPATIENT)
Dept: CARDIAC REHAB | Facility: HOSPITAL | Age: 75
End: 2025-08-11
Payer: MEDICARE

## 2025-08-11 DIAGNOSIS — Z95.5 STENTED CORONARY ARTERY: Primary | ICD-10-CM

## 2025-08-11 PROCEDURE — 93798 PHYS/QHP OP CAR RHAB W/ECG: CPT

## 2025-08-13 ENCOUNTER — TREATMENT (OUTPATIENT)
Dept: CARDIAC REHAB | Facility: HOSPITAL | Age: 75
End: 2025-08-13
Payer: MEDICARE

## 2025-08-13 DIAGNOSIS — Z95.5 STENTED CORONARY ARTERY: Primary | ICD-10-CM

## 2025-08-13 PROCEDURE — 93798 PHYS/QHP OP CAR RHAB W/ECG: CPT

## 2025-08-15 ENCOUNTER — TREATMENT (OUTPATIENT)
Dept: CARDIAC REHAB | Facility: HOSPITAL | Age: 75
End: 2025-08-15
Payer: MEDICARE

## 2025-08-15 DIAGNOSIS — Z95.5 STENTED CORONARY ARTERY: Primary | ICD-10-CM

## 2025-08-15 PROCEDURE — 93798 PHYS/QHP OP CAR RHAB W/ECG: CPT

## 2025-08-20 ENCOUNTER — TREATMENT (OUTPATIENT)
Dept: CARDIAC REHAB | Facility: HOSPITAL | Age: 75
End: 2025-08-20
Payer: MEDICARE

## 2025-08-20 DIAGNOSIS — Z95.5 STENTED CORONARY ARTERY: Primary | ICD-10-CM

## 2025-08-20 PROCEDURE — 93798 PHYS/QHP OP CAR RHAB W/ECG: CPT

## 2025-08-22 ENCOUNTER — TREATMENT (OUTPATIENT)
Dept: CARDIAC REHAB | Facility: HOSPITAL | Age: 75
End: 2025-08-22
Payer: MEDICARE

## 2025-08-22 ENCOUNTER — TELEPHONE (OUTPATIENT)
Dept: CARDIOLOGY | Facility: CLINIC | Age: 75
End: 2025-08-22
Payer: MEDICARE

## 2025-08-22 DIAGNOSIS — Z95.5 STENTED CORONARY ARTERY: Primary | ICD-10-CM

## 2025-08-22 PROCEDURE — 93798 PHYS/QHP OP CAR RHAB W/ECG: CPT

## 2025-08-25 ENCOUNTER — TREATMENT (OUTPATIENT)
Dept: CARDIAC REHAB | Facility: HOSPITAL | Age: 75
End: 2025-08-25
Payer: MEDICARE

## 2025-08-25 DIAGNOSIS — Z95.5 STENTED CORONARY ARTERY: Primary | ICD-10-CM

## 2025-08-25 PROCEDURE — 93798 PHYS/QHP OP CAR RHAB W/ECG: CPT

## 2025-08-27 ENCOUNTER — TREATMENT (OUTPATIENT)
Dept: CARDIAC REHAB | Facility: HOSPITAL | Age: 75
End: 2025-08-27
Payer: MEDICARE

## 2025-08-27 DIAGNOSIS — Z95.5 STENTED CORONARY ARTERY: Primary | ICD-10-CM

## 2025-08-27 PROCEDURE — 93798 PHYS/QHP OP CAR RHAB W/ECG: CPT

## 2025-08-29 ENCOUNTER — TREATMENT (OUTPATIENT)
Dept: CARDIAC REHAB | Facility: HOSPITAL | Age: 75
End: 2025-08-29
Payer: MEDICARE

## 2025-08-29 DIAGNOSIS — Z95.5 STENTED CORONARY ARTERY: Primary | ICD-10-CM

## 2025-08-29 PROCEDURE — 93798 PHYS/QHP OP CAR RHAB W/ECG: CPT

## (undated) DEVICE — Device: Brand: ASAHI SION BLUE

## (undated) DEVICE — CATH DIAG EXPO M/ PK 6FR FL4/FR4 PIG 3PK

## (undated) DEVICE — MODEL BT2000 P/N 700287-012KIT CONTENTS: MANIFOLD WITH SALINE AND CONTRAST PORTS, SALINE TUBING WITH SPIKE AND HAND SYRINGE, TRANSDUCER: Brand: BT2000 AUTOMATED MANIFOLD KIT

## (undated) DEVICE — KT VLV HEMO MAP ACC PLS LG/BORE MTL/INTRO W/TORQ/DEV

## (undated) DEVICE — NC TREK NEO™ CORONARY DILATATION CATHETER 4.00 MM X 15 MM / RAPID-EXCHANGE: Brand: NC TREK NEO™

## (undated) DEVICE — PK CATH CARD 10

## (undated) DEVICE — TREK CORONARY DILATATION CATHETER 3.0 MM X 15 MM / RAPID-EXCHANGE: Brand: TREK

## (undated) DEVICE — CATH DIAG IMPULSE FL3.5 6F 100CM

## (undated) DEVICE — GW PERIPH GUIDERIGHT STD/EXCHNG/J/TIP SS 0.035IN 5X260CM

## (undated) DEVICE — NDL ANGIOGR ADV THN SMOTH SGLWALL 21G 1.5

## (undated) DEVICE — CATH GUIDE LAUNCHER EBU3.0 6F 100CM

## (undated) DEVICE — GLIDESHEATH BASIC HYDROPHILIC COATED INTRODUCER SHEATH: Brand: GLIDESHEATH

## (undated) DEVICE — MODEL AT P65, P/N 701554-001KIT CONTENTS: HAND CONTROLLER, 3-WAY HIGH-PRESSURE STOPCOCK WITH ROTATING END AND PREMIUM HIGH-PRESSURE TUBING: Brand: ANGIOTOUCH® KIT

## (undated) DEVICE — COPILOT BLEEDBACK CONTROL VALVE: Brand: COPILOT

## (undated) DEVICE — TR BAND RADIAL ARTERY COMPRESSION DEVICE: Brand: TR BAND

## (undated) DEVICE — ADULT, W/LG. BACK PAD, RADIOTRANSPARENT ELEMENT AND LEAD WIRE COMPATIBLE W/: Brand: DEFIBRILLATION ELECTRODES

## (undated) DEVICE — GW PRESSUREWIRE X WIRELESS FFR 175CM